# Patient Record
Sex: FEMALE | Race: OTHER | NOT HISPANIC OR LATINO | Employment: OTHER | URBAN - METROPOLITAN AREA
[De-identification: names, ages, dates, MRNs, and addresses within clinical notes are randomized per-mention and may not be internally consistent; named-entity substitution may affect disease eponyms.]

---

## 2020-02-17 LAB — HCV AB SER-ACNC: NEGATIVE

## 2020-06-08 ENCOUNTER — TELEPHONE (OUTPATIENT)
Dept: ADMINISTRATIVE | Facility: OTHER | Age: 65
End: 2020-06-08

## 2020-06-09 ENCOUNTER — OFFICE VISIT (OUTPATIENT)
Dept: FAMILY MEDICINE CLINIC | Facility: CLINIC | Age: 65
End: 2020-06-09
Payer: COMMERCIAL

## 2020-06-09 VITALS
TEMPERATURE: 97.7 F | BODY MASS INDEX: 28.34 KG/M2 | HEIGHT: 68 IN | WEIGHT: 187 LBS | RESPIRATION RATE: 17 BRPM | HEART RATE: 94 BPM | SYSTOLIC BLOOD PRESSURE: 114 MMHG | OXYGEN SATURATION: 96 % | DIASTOLIC BLOOD PRESSURE: 78 MMHG

## 2020-06-09 DIAGNOSIS — M25.50 ARTHRALGIA, UNSPECIFIED JOINT: ICD-10-CM

## 2020-06-09 DIAGNOSIS — Z53.20 MAMMOGRAM DECLINED: ICD-10-CM

## 2020-06-09 DIAGNOSIS — I10 ESSENTIAL HYPERTENSION: ICD-10-CM

## 2020-06-09 DIAGNOSIS — Z78.0 POSTMENOPAUSAL: ICD-10-CM

## 2020-06-09 DIAGNOSIS — I21.4 NSTEMI, INITIAL EPISODE OF CARE (HCC): ICD-10-CM

## 2020-06-09 DIAGNOSIS — Z13.31 NEGATIVE DEPRESSION SCREENING: ICD-10-CM

## 2020-06-09 DIAGNOSIS — E03.9 HYPOTHYROIDISM, UNSPECIFIED TYPE: ICD-10-CM

## 2020-06-09 DIAGNOSIS — Z12.4 CERVICAL CANCER SCREENING: ICD-10-CM

## 2020-06-09 DIAGNOSIS — Z76.89 ENCOUNTER TO ESTABLISH CARE: Primary | ICD-10-CM

## 2020-06-09 PROBLEM — K21.9 GASTROESOPHAGEAL REFLUX DISEASE WITHOUT ESOPHAGITIS: Status: ACTIVE | Noted: 2020-01-23

## 2020-06-09 PROBLEM — S83.241A ACUTE MEDIAL MENISCUS TEAR OF RIGHT KNEE: Status: ACTIVE | Noted: 2019-07-03

## 2020-06-09 PROBLEM — E78.2 MIXED HYPERLIPIDEMIA: Status: ACTIVE | Noted: 2020-01-23

## 2020-06-09 PROBLEM — M23.91 INTERNAL DERANGEMENT OF RIGHT KNEE: Status: ACTIVE | Noted: 2019-07-03

## 2020-06-09 PROCEDURE — 3008F BODY MASS INDEX DOCD: CPT | Performed by: PHYSICIAN ASSISTANT

## 2020-06-09 PROCEDURE — 99204 OFFICE O/P NEW MOD 45 MIN: CPT | Performed by: PHYSICIAN ASSISTANT

## 2020-06-09 PROCEDURE — 3074F SYST BP LT 130 MM HG: CPT | Performed by: PHYSICIAN ASSISTANT

## 2020-06-09 PROCEDURE — 1036F TOBACCO NON-USER: CPT | Performed by: PHYSICIAN ASSISTANT

## 2020-06-09 PROCEDURE — 3078F DIAST BP <80 MM HG: CPT | Performed by: PHYSICIAN ASSISTANT

## 2020-06-09 RX ORDER — CLOPIDOGREL BISULFATE 75 MG/1
75 TABLET ORAL EVERY MORNING
COMMUNITY
Start: 2020-05-16 | End: 2020-08-03

## 2020-06-09 RX ORDER — AMLODIPINE BESYLATE 5 MG/1
5 TABLET ORAL DAILY
COMMUNITY
Start: 2020-05-16 | End: 2020-10-28 | Stop reason: SDUPTHER

## 2020-06-09 RX ORDER — ASPIRIN 81 MG
81 TABLET,CHEWABLE ORAL EVERY MORNING
COMMUNITY
Start: 2020-05-16 | End: 2022-02-08 | Stop reason: HOSPADM

## 2020-06-09 RX ORDER — VENLAFAXINE 75 MG/1
75 TABLET ORAL 2 TIMES DAILY
COMMUNITY
Start: 2020-03-16 | End: 2020-10-28 | Stop reason: SDUPTHER

## 2020-06-09 RX ORDER — LEVOTHYROXINE SODIUM 125 UG/1
125 TABLET ORAL EVERY MORNING
COMMUNITY
Start: 2020-03-16 | End: 2020-09-28 | Stop reason: SDUPTHER

## 2020-06-09 RX ORDER — ATORVASTATIN CALCIUM 40 MG/1
40 TABLET, FILM COATED ORAL
COMMUNITY
End: 2020-08-03 | Stop reason: SDUPTHER

## 2020-08-03 ENCOUNTER — CONSULT (OUTPATIENT)
Dept: CARDIOLOGY CLINIC | Facility: CLINIC | Age: 65
End: 2020-08-03
Payer: COMMERCIAL

## 2020-08-03 VITALS
DIASTOLIC BLOOD PRESSURE: 80 MMHG | WEIGHT: 186 LBS | HEART RATE: 75 BPM | HEIGHT: 68 IN | SYSTOLIC BLOOD PRESSURE: 130 MMHG | BODY MASS INDEX: 28.19 KG/M2

## 2020-08-03 DIAGNOSIS — E78.2 MIXED HYPERLIPIDEMIA: ICD-10-CM

## 2020-08-03 DIAGNOSIS — I10 ESSENTIAL HYPERTENSION: Primary | ICD-10-CM

## 2020-08-03 DIAGNOSIS — I21.4 NSTEMI, INITIAL EPISODE OF CARE (HCC): ICD-10-CM

## 2020-08-03 PROBLEM — I25.10 CORONARY ARTERY DISEASE INVOLVING NATIVE CORONARY ARTERY OF NATIVE HEART WITHOUT ANGINA PECTORIS: Status: ACTIVE | Noted: 2020-08-03

## 2020-08-03 PROCEDURE — 3079F DIAST BP 80-89 MM HG: CPT | Performed by: INTERNAL MEDICINE

## 2020-08-03 PROCEDURE — 99244 OFF/OP CNSLTJ NEW/EST MOD 40: CPT | Performed by: INTERNAL MEDICINE

## 2020-08-03 PROCEDURE — 3075F SYST BP GE 130 - 139MM HG: CPT | Performed by: INTERNAL MEDICINE

## 2020-08-03 PROCEDURE — 1036F TOBACCO NON-USER: CPT | Performed by: INTERNAL MEDICINE

## 2020-08-03 PROCEDURE — 3008F BODY MASS INDEX DOCD: CPT | Performed by: INTERNAL MEDICINE

## 2020-08-03 PROCEDURE — 93000 ELECTROCARDIOGRAM COMPLETE: CPT | Performed by: INTERNAL MEDICINE

## 2020-08-03 RX ORDER — ATORVASTATIN CALCIUM 80 MG/1
80 TABLET, FILM COATED ORAL
Qty: 90 TABLET | Refills: 5 | Status: SHIPPED | OUTPATIENT
Start: 2020-08-03 | End: 2021-03-31 | Stop reason: SDUPTHER

## 2020-08-03 NOTE — ASSESSMENT & PLAN NOTE
Refill requested for:     Losartan 50 mg  Last filled on:     9/07/2017  #30  0 refills    Last office visit:  3/16/2017     Pending office visit none    Medication refilled per protocol.  Per MD note:Recheck one year or before prn for CPX.         Same Losartan and prn Triam/HCTZ.    Relatively mild despite recent non ST elevation MI  Plavix can be stopped as there was no intervention

## 2020-08-03 NOTE — PATIENT INSTRUCTIONS
Stop plavix (clopidogrel)  Can hold amlodipine if systolic BP (top number) remains mostly 135 and lower  Increase atorvastatin to 80 mg daily

## 2020-08-03 NOTE — PROGRESS NOTES
Patient ID: Bonilla Turner is a 60 y o  female  Plan:      Essential hypertension  Well controlled  Coronary artery disease involving native coronary artery of native heart without angina pectoris  Relatively mild despite recent non ST elevation MI  Plavix can be stopped as there was no intervention  Mixed hyperlipidemia  LDL was quite high in January  Will increase atorvastatin to 80 mg daily  Follow up Plan:  1 year EKG and follow-up visit  I gave the patient the following instructions:  Stop plavix (clopidogrel)  Can hold amlodipine if systolic BP (top number) remains mostly 135 and lower  Increase atorvastatin to 80 mg daily  HPI:  Patient is seen today in consultation from [address] [place] [address] regarding the above  The patient, who is known as Bassam, has a strong family history of early CAD but not early sudden death  In January of 2020 she experience indigestion and went to [facility]  Blood pressure was quite high  There was a non ST elevation MI  Coronary angiography revealed severe stenosis of a small 1st diagonal vessel but otherwise mild diffuse disease  Medical therapy was of course recommended  She did not tolerate all of her medications and some have been peeled back over the last few months  She has had lots of bruising and would like to cut back further on meds if possible  No recent exertional indigestion  She remains active  Her diet is mainly vegetarian but she eats a fair amount of cheese  No syncope or near syncope  She is back to work  Results for orders placed or performed in visit on 08/03/20   POCT ECG    Impression    Normal sinus rhythm  Minor nonspecific ST segment changes  Past Surgical History:   Procedure Laterality Date    ANGIOPLASTY      CARDIAC CATHETERIZATION  01/22/2020    EF 75% There is a small LADD1 branch that has a severe stenosis   Given the small size of the vessel the plan will be to treat medically    TONSILLECTOMY       CMP: No results found for: NA, K, CL, CO2, BUN, CREATININE, GLUCOSE, EGFR    Lipid Profile: No results found for: CHOL, TRIG, HDL, LDL      Review of Systems   10  point ROS  was otherwise non pertinent or negative except as per HPI or as below  Gait: Normal         Objective:     /80   Pulse 75   Ht 5' 8"   Wt 84 4 kg (186 lb)   BMI 28 28 kg/m²     PHYSICAL EXAM:    General:  Normal appearance in no distress  Eyes:  Anicteric  Oral mucosa:  Moist   Neck:  No JVD  Carotid upstrokes are brisk without bruits  No masses  Chest:  Clear to auscultation and percussion  Cardiac:  Normal PMI  Normal S1 and S2  No murmur gallop or rub  Abdomen:  Soft and nontender  No palpable organomegaly or aortic enlargement  Extremities:  No peripheral edema  Musculoskeletal:  Symmetric  Vascular:  Femoral pulses are brisk without bruits  Popliteal pulses are intact bilaterally  Pedal pulses are intact  Neuro:  Grossly symmetric  Psych:  Alert and oriented x3          Current Outpatient Medications:     amLODIPine (NORVASC) 5 mg tablet, Take 5 mg by mouth daily , Disp: , Rfl:     ASPIRIN LOW DOSE 81 MG chewable tablet, Chew 81 mg every morning , Disp: , Rfl:     atorvastatin (LIPITOR) 80 mg tablet, Take 1 tablet (80 mg total) by mouth daily at bedtime, Disp: 90 tablet, Rfl: 5    SYNTHROID 125 MCG tablet, Take 125 mcg by mouth every morning , Disp: , Rfl:     venlafaxine (EFFEXOR) 75 mg tablet, Take 75 mg by mouth 2 (two) times a day , Disp: , Rfl:   Allergies   Allergen Reactions    Other Hives and Rash     Adhesive tape       Past Medical History:   Diagnosis Date    Essential hypertension     Exertional chest pain     Hypothyroidism     Unstable angina pectoris ([facility] [address] ) 01/28/2020           Social History     Tobacco Use   Smoking Status Former Smoker   Smokeless Tobacco Never Used

## 2020-08-04 ENCOUNTER — TELEPHONE (OUTPATIENT)
Dept: CARDIOLOGY CLINIC | Facility: CLINIC | Age: 65
End: 2020-08-04

## 2020-08-04 NOTE — TELEPHONE ENCOUNTER
[facility]               Wants to go over lab results   Family doctor told her different from what Dr Sam Brown told        I called Patsy Arias and she said her labs were just done 8/1  They are in epic and her lipids are really good her PCP told her  So she said to let you know in case you do not want her to increase atorvastatin to 80mg  She said she lost a lot of weight and eating healthier so feels that's why her numbers improved from January  She also wanted me to ask you if you are related to an [place]? Some one she knew from camp years ago

## 2020-09-28 ENCOUNTER — TELEPHONE (OUTPATIENT)
Dept: FAMILY MEDICINE CLINIC | Facility: CLINIC | Age: 65
End: 2020-09-28

## 2020-09-28 DIAGNOSIS — E03.9 HYPOTHYROIDISM, UNSPECIFIED TYPE: Primary | ICD-10-CM

## 2020-09-28 RX ORDER — LEVOTHYROXINE SODIUM 125 UG/1
125 TABLET ORAL EVERY MORNING
Qty: 90 TABLET | Refills: 1 | Status: SHIPPED | OUTPATIENT
Start: 2020-09-28 | End: 2020-09-30

## 2020-09-30 DIAGNOSIS — E03.9 HYPOTHYROIDISM, UNSPECIFIED TYPE: Primary | ICD-10-CM

## 2020-09-30 RX ORDER — LEVOTHYROXINE SODIUM 0.12 MG/1
125 TABLET ORAL DAILY
Qty: 90 TABLET | Refills: 1 | Status: SHIPPED | OUTPATIENT
Start: 2020-09-30 | End: 2021-03-31 | Stop reason: SDUPTHER

## 2020-09-30 NOTE — TELEPHONE ENCOUNTER
Pharmacy called and would like clarification on the Levothyroxine script  Pharmacy called that it was written for brand necessary but patient takes generic and can not afford synthroid  Patient is out of medication and will need this addressed today

## 2020-10-01 NOTE — TELEPHONE ENCOUNTER
It was reconciled as brand name when she established care from [address]   I sent generic now

## 2020-10-28 DIAGNOSIS — F41.9 ANXIETY: ICD-10-CM

## 2020-10-28 DIAGNOSIS — F32.A DEPRESSIVE DISORDER: ICD-10-CM

## 2020-10-28 DIAGNOSIS — I10 ESSENTIAL HYPERTENSION: Primary | ICD-10-CM

## 2020-10-28 DIAGNOSIS — E03.9 HYPOTHYROIDISM, UNSPECIFIED TYPE: ICD-10-CM

## 2020-10-28 RX ORDER — VENLAFAXINE 75 MG/1
75 TABLET ORAL 2 TIMES DAILY
Qty: 90 TABLET | Refills: 1 | Status: SHIPPED | OUTPATIENT
Start: 2020-10-28 | End: 2021-01-25

## 2020-10-28 RX ORDER — AMLODIPINE BESYLATE 5 MG/1
5 TABLET ORAL DAILY
Qty: 90 TABLET | Refills: 1 | Status: SHIPPED | OUTPATIENT
Start: 2020-10-28 | End: 2021-03-02 | Stop reason: SDUPTHER

## 2020-12-04 ENCOUNTER — TELEPHONE (OUTPATIENT)
Dept: FAMILY MEDICINE CLINIC | Facility: CLINIC | Age: 65
End: 2020-12-04

## 2020-12-31 ENCOUNTER — TELEPHONE (OUTPATIENT)
Dept: FAMILY MEDICINE CLINIC | Facility: CLINIC | Age: 65
End: 2020-12-31

## 2021-01-23 DIAGNOSIS — F41.9 ANXIETY: ICD-10-CM

## 2021-01-23 DIAGNOSIS — F32.A DEPRESSIVE DISORDER: ICD-10-CM

## 2021-01-25 RX ORDER — VENLAFAXINE 75 MG/1
TABLET ORAL
Qty: 90 TABLET | Refills: 1 | Status: SHIPPED | OUTPATIENT
Start: 2021-01-25 | End: 2021-03-02 | Stop reason: SDUPTHER

## 2021-02-09 ENCOUNTER — OFFICE VISIT (OUTPATIENT)
Dept: FAMILY MEDICINE CLINIC | Facility: CLINIC | Age: 66
End: 2021-02-09
Payer: MEDICARE

## 2021-02-09 VITALS
DIASTOLIC BLOOD PRESSURE: 82 MMHG | OXYGEN SATURATION: 97 % | SYSTOLIC BLOOD PRESSURE: 128 MMHG | BODY MASS INDEX: 30.16 KG/M2 | RESPIRATION RATE: 18 BRPM | TEMPERATURE: 97.8 F | HEIGHT: 68 IN | WEIGHT: 199 LBS | HEART RATE: 68 BPM

## 2021-02-09 DIAGNOSIS — E03.8 OTHER SPECIFIED HYPOTHYROIDISM: ICD-10-CM

## 2021-02-09 DIAGNOSIS — I10 ESSENTIAL HYPERTENSION: ICD-10-CM

## 2021-02-09 DIAGNOSIS — E55.9 VITAMIN D DEFICIENCY: ICD-10-CM

## 2021-02-09 DIAGNOSIS — E78.2 MIXED HYPERLIPIDEMIA: ICD-10-CM

## 2021-02-09 DIAGNOSIS — Z00.00 WELCOME TO MEDICARE PREVENTIVE VISIT: Primary | ICD-10-CM

## 2021-02-09 DIAGNOSIS — E03.9 HYPOTHYROIDISM, UNSPECIFIED TYPE: ICD-10-CM

## 2021-02-09 DIAGNOSIS — Z12.12 SCREENING FOR COLORECTAL CANCER: ICD-10-CM

## 2021-02-09 DIAGNOSIS — Z12.11 SCREENING FOR COLORECTAL CANCER: ICD-10-CM

## 2021-02-09 PROCEDURE — 1123F ACP DISCUSS/DSCN MKR DOCD: CPT | Performed by: PHYSICIAN ASSISTANT

## 2021-02-09 PROCEDURE — G0402 INITIAL PREVENTIVE EXAM: HCPCS | Performed by: PHYSICIAN ASSISTANT

## 2021-02-09 NOTE — PROGRESS NOTES
Assessment and Plan:     Problem List Items Addressed This Visit        Endocrine    Hypothyroidism    Relevant Orders    TSH, 3rd generation    T4, free    TSH, 3rd generation    T4, free       Cardiovascular and Mediastinum    Essential hypertension    Relevant Orders    Comprehensive metabolic panel       Other    Mixed hyperlipidemia    Relevant Orders    Lipid panel      Other Visit Diagnoses     Welcome to Medicare preventive visit    -  Primary    Screening for colorectal cancer        Relevant Orders    Cologuard    BMI 30 0-30 9,adult        Vitamin D deficiency        Relevant Orders    Vitamin D 25 hydroxy           Preventive health issues were discussed with patient, and age appropriate screening tests were ordered as noted in patient's After Visit Summary  Personalized health advice and appropriate referrals for health education or preventive services given if needed, as noted in patient's After Visit Summary  History of Present Illness:     Chief Complaint   Patient presents with    Follow-up     Declines mammogram  Does not see a need to see gyn at this time    Medicare Wellness Visit     Welcome to [facility]        Patient presents for Welcome to Medicare visit  Patient Care Team:  Severo Riddle, PA-C as PCP - General (Family Medicine)     Review of Systems:     Review of Systems   Constitutional: Negative  Respiratory: Negative  Cardiovascular: Negative  Gastrointestinal: Negative  Genitourinary: Negative  Skin: Negative  Neurological: Negative  Psychiatric/Behavioral: Nervous/anxious: stable         Problem List:     Patient Active Problem List   Diagnosis    BMI 28 0-28 9,adult    Acute medial meniscus tear of right knee    Anxiety    Contact dermatitis and other eczema, due to unspecified cause    Depressive disorder    Essential hypertension    Gastroesophageal reflux disease without esophagitis    Hypothyroidism    Internal derangement of right knee  Mixed hyperlipidemia    NSTEMI, initial [facility] of [facility])    Coronary artery disease involving native coronary artery of native heart without angina pectoris      Past Medical and Surgical History:     Past Medical History:   Diagnosis Date    Essential hypertension     Exertional chest pain     Hypothyroidism     Unstable angina pectoris ([address] ) 01/28/2020     Past Surgical History:   Procedure Laterality Date    ANGIOPLASTY      CARDIAC CATHETERIZATION  01/22/2020    EF 75% There is a small LADD1 branch that has a severe stenosis   Given the small size of the vessel the plan will be to treat medically    TONSILLECTOMY        Family History:     Family History   Problem Relation Age of Onset    Thyroid disease Mother     Heart attack Father     Heart attack Brother     Thyroid disease Maternal Grandmother     Cancer Maternal Grandmother     Diabetes Maternal Grandfather     Heart attack Maternal Grandfather     Heart attack Paternal Grandfather     Heart attack Maternal Uncle       Social History:        Social History     Socioeconomic History    Marital status: Single     Spouse name: None    Number of children: None    Years of education: None    Highest education level: None   Occupational History    None   Social Needs    Financial resource strain: None    Food insecurity     Worry: None     Inability: None    Transportation needs     Medical: No     Non-medical: No   Tobacco Use    Smoking status: Former Smoker    Smokeless tobacco: Never Used   Substance and Sexual Activity    Alcohol use: Not Currently    Drug use: Never    Sexual activity: Not Currently   Lifestyle    Physical activity     Days per week: None     Minutes per session: None    Stress: None   Relationships    Social connections     Talks on phone: None     Gets together: None     Attends Taoism service: None     Active member of club or organization: None     Attends meetings of clubs or organizations: None     Relationship status: None    Intimate partner violence     Fear of current or ex partner: None     Emotionally abused: None     Physically abused: None     Forced sexual activity: None   Other Topics Concern    None   Social History Narrative    None      Medications and Allergies:     Current Outpatient Medications   Medication Sig Dispense Refill    amLODIPine (NORVASC) 5 mg tablet Take 1 tablet (5 mg total) by mouth daily 90 tablet 1    ASPIRIN LOW DOSE 81 MG chewable tablet Chew 81 mg every morning       atorvastatin (LIPITOR) 80 mg tablet Take 1 tablet (80 mg total) by mouth daily at bedtime 90 tablet 5    levothyroxine 125 mcg tablet Take 1 tablet (125 mcg total) by mouth daily 90 tablet 1    venlafaxine (EFFEXOR) 75 mg tablet Take 1 tablet by mouth twice daily 90 tablet 1     No current facility-administered medications for this visit  Allergies   Allergen Reactions    Other Hives and Rash     Adhesive tape        Immunizations:     Immunization History   Administered Date(s) Administered    Lyme Disease 08/01/2003    Pneumococcal Polysaccharide PPV23 01/29/2020    Rotavirus Tetravalent 08/01/2016    Tuberculin Skin Test-PPD Intradermal 05/19/2011    Unknown 08/01/2016      Health Maintenance:         Topic Date Due    HIV Screening  [birth year]20/1970    Cervical Cancer Screening  02/20/1977    Colorectal Cancer Screening  02/16/2021    MAMMOGRAM  02/09/2022 (Originally 1955)    Hepatitis C Screening  Completed         Topic Date Due    DTaP,Tdap,and Td Vaccines (1 - Tdap) 02/20/1977    Influenza Vaccine (1) 09/01/2020      Medicare Screening Tests and Risk Assessments:     Jonh Darnell is here for her Welcome to Medicare visit  Health Risk Assessment:   Patient rates overall health as good  Patient feels that their physical health rating is same  Eyesight was rated as same  Hearing was rated as same   Patient feels that their emotional and mental health rating is same  Pain experienced in the last 7 days has been some  Patient's pain rating has been 3/10  Patient states that she has experienced weight loss or gain in last 6 months  She sees [facility]  Then went to [facility] for readers  Depression Screening:   PHQ-2 Score: 0  PHQ-9 Score: 0      Fall Risk Screening: In the past year, patient has experienced: no history of falling in past year      Urinary Incontinence Screening:   Patient has not leaked urine accidently in the last six months  Home Safety:  Patient does not have trouble with stairs inside or outside of their home  Patient has working smoke alarms and has working carbon monoxide detector  Home safety hazards include: none  Nutrition:   Current diet is Other (please comment)  No meat, no caffeine,     Medications:   Patient is currently taking over-the-counter supplements  OTC medications include: see medication list  Patient is able to manage medications  Activities of Daily Living (ADLs)/Instrumental Activities of Daily Living (IADLs):   Walk and transfer into and out of bed and chair?: Yes  Dress and groom yourself?: Yes    Bathe or shower yourself?: Yes    Feed yourself?  Yes  Do your laundry/housekeeping?: Yes  Manage your money, pay your bills and track your expenses?: Yes  Make your own meals?: Yes    Do your own shopping?: Yes    Previous Hospitalizations:   Any hospitalizations or ED visits within the last 12 months?: Yes    How many hospitalizations have you had in the last year?: 1-2    Advance Care Planning:   Living will: No    Advanced directive counseling given: Yes      Cognitive Screening:   Provider or family/friend/caregiver concerned regarding cognition?: No    PREVENTIVE SCREENINGS      Cardiovascular Screening:    General: Screening Not Indicated and History Lipid Disorder      Diabetes Screening:     General: Screening Current    Due for: Blood Glucose      Colorectal Cancer Screening:     General: Screening Current    Due for: Cologuard      Breast Cancer Screening:     General: Patient Declines      Cervical Cancer Screening:    General: Patient Declines      Osteoporosis Screening:    General: Patient Declines    Due for: Bone Density Ultrasound      Abdominal Aortic Aneurysm (AAA) Screening:        General: Screening Not Indicated      Lung Cancer Screening:     General: Screening Not Indicated      Hepatitis C Screening:    General: Screening Current    No exam data present     Physical Exam:     /82 (BP Location: Left arm, Patient Position: Sitting)   Pulse 68   Temp 97 8 °F (36 6 °C)   Resp 18   Ht 5' 8" (1 727 m)   Wt 90 3 kg (199 lb)   SpO2 97%   BMI 30 26 kg/m²     Physical Exam  Constitutional:       General: She is not in acute distress  Appearance: Normal appearance  She is not diaphoretic  HENT:      Head: Normocephalic and atraumatic  Right Ear: Tympanic membrane, ear canal and external ear normal       Left Ear: Tympanic membrane, ear canal and external ear normal       Nose: Nose normal       Mouth/Throat:      Mouth: Mucous membranes are moist       Pharynx: Oropharynx is clear  Eyes:      Conjunctiva/sclera: Conjunctivae normal       Pupils: Pupils are equal, round, and reactive to light  Neck:      Musculoskeletal: Normal range of motion and neck supple  Cardiovascular:      Rate and Rhythm: Normal rate and regular rhythm  Pulses: Normal pulses  Pulmonary:      Effort: Pulmonary effort is normal       Breath sounds: Normal breath sounds  No wheezing  Musculoskeletal:         General: No deformity or signs of injury  Right lower leg: No edema  Left lower leg: No edema  Skin:     General: Skin is warm and dry  Findings: No erythema or rash  Neurological:      General: No focal deficit present  Mental Status: She is alert and oriented to person, place, and time  Cranial Nerves: No cranial nerve deficit     Psychiatric: Mood and Affect: Mood normal          Behavior: Behavior normal          Thought Content:  Thought content normal          Judgment: Judgment normal           Severo Riddle, PA-C

## 2021-02-09 NOTE — PATIENT INSTRUCTIONS
Medicare Preventive Visit Patient Instructions  Thank you for completing your Welcome to Medicare Visit or Medicare Annual Wellness Visit today  Your next wellness visit will be due in one year (2/9/2022)  The screening/preventive services that you may require over the next 5-10 years are detailed below  Some tests may not apply to you based off risk factors and/or age  Screening tests ordered at today's visit but not completed yet may show as past due  Also, please note that scanned in results may not display below  Preventive Screenings:  Service Recommendations Previous Testing/Comments   Colorectal Cancer Screening  * Colonoscopy    * Fecal Occult Blood Test (FOBT)/Fecal Immunochemical Test (FIT)  * Fecal DNA/Cologuard Test  * Flexible Sigmoidoscopy Age: 54-65 years old   Colonoscopy: every 10 years (may be performed more frequently if at higher risk)  OR  FOBT/FIT: every 1 year  OR  Cologuard: every 3 years  OR  Sigmoidoscopy: every 5 years  Screening may be recommended earlier than age 48 if at higher risk for colorectal cancer  Also, an individualized decision between you and your healthcare provider will decide whether screening between the ages of 74-80 would be appropriate  Colonoscopy: Not on file  FOBT/FIT: 02/16/2020  Cologuard: Not on file  Sigmoidoscopy: Not on file    Screening Current     Breast Cancer Screening Age: 36 years old  Frequency: every 1-2 years  Not required if history of left and right mastectomy Mammogram: Not on file       Cervical Cancer Screening Between the ages of 21-29, pap smear recommended once every 3 years  Between the ages of 33-67, can perform pap smear with HPV co-testing every 5 years     Recommendations may differ for women with a history of total hysterectomy, cervical cancer, or abnormal pap smears in past  Pap Smear: Not on file       Hepatitis C Screening Once for adults born between 1945 and 1965  More frequently in patients at high risk for Hepatitis C Hep C Antibody: 02/17/2020    Screening Current   Diabetes Screening 1-2 times per year if you're at risk for diabetes or have pre-diabetes Fasting glucose: No results in last 5 years   A1C: 5 6 %       Cholesterol Screening Once every 5 years if you don't have a lipid disorder  May order more often based on risk factors  Lipid panel: 08/01/2020    Screening Not Indicated  History Lipid Disorder     Other Preventive Screenings Covered by Medicare:  1  Abdominal Aortic Aneurysm (AAA) Screening: covered once if your at risk  You're considered to be at risk if you have a family history of AAA  2  Lung Cancer Screening: covers low dose CT scan once per year if you meet all of the following conditions: (1) Age 50-69; (2) No signs or symptoms of lung cancer; (3) Current smoker or have quit smoking within the last 15 years; (4) You have a tobacco smoking history of at least 30 pack years (packs per day multiplied by number of years you smoked); (5) You get a written order from a healthcare provider  3  Glaucoma Screening: covered annually if you're considered high risk: (1) You have diabetes OR (2) Family history of glaucoma OR (3)  aged 48 and older OR (3)  American aged 72 and older  3  Osteoporosis Screening: covered every 2 years if you meet one of the following conditions: (1) You're estrogen deficient and at risk for osteoporosis based off medical history and other findings; (2) Have a vertebral abnormality; (3) On glucocorticoid therapy for more than 3 months; (4) Have primary hyperparathyroidism; (5) On osteoporosis medications and need to assess response to drug therapy  · Last bone density test (DXA Scan): Not on file  5  HIV Screening: covered annually if you're between the age of 12-76  Also covered annually if you are younger than 13 and older than 72 with risk factors for HIV infection  For pregnant patients, it is covered up to 3 times per pregnancy      Immunizations:  Immunization Recommendations   Influenza Vaccine Annual influenza vaccination during flu season is recommended for all persons aged >= 6 months who do not have contraindications   Pneumococcal Vaccine (Prevnar and Pneumovax)  * Prevnar = PCV13  * Pneumovax = PPSV23   Adults 25-60 years old: 1-3 doses may be recommended based on certain risk factors  Adults 72 years old: Prevnar (PCV13) vaccine recommended followed by Pneumovax (PPSV23) vaccine  If already received PPSV23 since turning 65, then PCV13 recommended at least one year after PPSV23 dose  Hepatitis B Vaccine 3 dose series if at intermediate or high risk (ex: diabetes, end stage renal disease, liver disease)   Tetanus (Td) Vaccine - COST NOT COVERED BY MEDICARE PART B Following completion of primary series, a booster dose should be given every 10 years to maintain immunity against tetanus  Td may also be given as tetanus wound prophylaxis  Tdap Vaccine - COST NOT COVERED BY MEDICARE PART B Recommended at least once for all adults  For pregnant patients, recommended with each pregnancy  Shingles Vaccine (Shingrix) - COST NOT COVERED BY MEDICARE PART B  2 shot series recommended in those aged 48 and above     Health Maintenance Due:      Topic Date Due    MAMMOGRAM  1955    HIV Screening  02/20/1971    Cervical Cancer Screening  02/20/1977    Colorectal Cancer Screening  02/16/2021    Hepatitis C Screening  Completed     Immunizations Due:      Topic Date Due    DTaP,Tdap,and Td Vaccines (1 - Tdap) 02/20/1977    Influenza Vaccine (1) 09/01/2020     Advance Directives   What are advance directives? Advance directives are legal documents that state your wishes and plans for medical care  These plans are made ahead of time in case you lose your ability to make decisions for yourself  Advance directives can apply to any medical decision, such as the treatments you want, and if you want to donate organs  What are the types of advance directives?   There are many types of advance directives, and each state has rules about how to use them  You may choose a combination of any of the following:  · Living will: This is a written record of the treatment you want  You can also choose which treatments you do not want, which to limit, and which to stop at a certain time  This includes surgery, medicine, IV fluid, and tube feedings  · Durable power of  for healthcare [facility]): This is a written record that states who you want to make healthcare choices for you when you are unable to make them for yourself  This person, called a proxy, is usually a family member or a friend  You may choose more than 1 proxy  · Do not resuscitate (DNR) order:  A DNR order is used in case your heart stops beating or you stop breathing  It is a request not to have certain forms of treatment, such as CPR  A DNR order may be included in other types of advance directives  · Medical directive: This covers the care that you want if you are in a coma, near death, or unable to make decisions for yourself  You can list the treatments you want for each condition  Treatment may include pain medicine, surgery, blood transfusions, dialysis, IV or tube feedings, and a ventilator (breathing machine)  · Values history: This document has questions about your views, beliefs, and how you feel and think about life  This information can help others choose the care that you would choose  Why are advance directives important? An advance directive helps you control your care  Although spoken wishes may be used, it is better to have your wishes written down  Spoken wishes can be misunderstood, or not followed  Treatments may be given even if you do not want them  An advance directive may make it easier for your family to make difficult choices about your care     Weight Management   Why it is important to manage your weight:  Being overweight increases your risk of health conditions such as heart disease, high blood pressure, type 2 diabetes, and certain types of cancer  It can also increase your risk for osteoarthritis, sleep apnea, and other respiratory problems  Aim for a slow, steady weight loss  Even a small amount of weight loss can lower your risk of health problems  How to lose weight safely:  A safe and healthy way to lose weight is to eat fewer calories and get regular exercise  You can lose up about 1 pound a week by decreasing the number of calories you eat by 500 calories each day  Healthy meal plan for weight management:  A healthy meal plan includes a variety of foods, contains fewer calories, and helps you stay healthy  A healthy meal plan includes the following:  · Eat whole-grain foods more often  A healthy meal plan should contain fiber  Fiber is the part of grains, fruits, and vegetables that is not broken down by your body  Whole-grain foods are healthy and provide extra fiber in your diet  Some examples of whole-grain foods are whole-wheat breads and pastas, oatmeal, brown rice, and bulgur  · Eat a variety of vegetables every day  Include dark, leafy greens such as spinach, kale, trevor greens, and mustard greens  Eat yellow and orange vegetables such as carrots, sweet potatoes, and winter squash  · Eat a variety of fruits every day  Choose fresh or canned fruit (canned in its own juice or light syrup) instead of juice  Fruit juice has very little or no fiber  · Eat low-fat dairy foods  Drink fat-free (skim) milk or 1% milk  Eat fat-free yogurt and low-fat cottage cheese  Try low-fat cheeses such as mozzarella and other reduced-fat cheeses  · Choose meat and other protein foods that are low in fat  Choose beans or other legumes such as split peas or lentils  Choose fish, skinless poultry (chicken or turkey), or lean cuts of red meat (beef or pork)  Before you cook meat or poultry, cut off any visible fat  · Use less fat and oil  Try baking foods instead of frying them  Add less fat, such as margarine, sour cream, regular salad dressing and mayonnaise to foods  Eat fewer high-fat foods  Some examples of high-fat foods include french fries, doughnuts, ice cream, and cakes  · Eat fewer sweets  Limit foods and drinks that are high in sugar  This includes candy, cookies, regular soda, and sweetened drinks  Exercise:  Exercise at least 30 minutes per day on most days of the week  Some examples of exercise include walking, biking, dancing, and swimming  You can also fit in more physical activity by taking the stairs instead of the elevator or parking farther away from stores  Ask your healthcare provider about the best exercise plan for you  © Copyright "[employer]" 2018 Information is for End User's use only and may not be sold, redistributed or otherwise used for commercial purposes   All illustrations and images included in CareNotes® are the copyrighted property of [employer]  or [address] [place] [address]

## 2021-03-02 ENCOUNTER — TELEPHONE (OUTPATIENT)
Dept: FAMILY MEDICINE CLINIC | Facility: CLINIC | Age: 66
End: 2021-03-02

## 2021-03-02 DIAGNOSIS — I10 ESSENTIAL HYPERTENSION: ICD-10-CM

## 2021-03-02 DIAGNOSIS — F32.A DEPRESSIVE DISORDER: ICD-10-CM

## 2021-03-02 DIAGNOSIS — E03.9 HYPOTHYROIDISM, UNSPECIFIED TYPE: ICD-10-CM

## 2021-03-02 DIAGNOSIS — F41.9 ANXIETY: ICD-10-CM

## 2021-03-02 RX ORDER — VENLAFAXINE 75 MG/1
75 TABLET ORAL 2 TIMES DAILY
Qty: 28 TABLET | Refills: 0 | Status: SHIPPED | OUTPATIENT
Start: 2021-03-02 | End: 2021-03-31 | Stop reason: SDUPTHER

## 2021-03-02 RX ORDER — AMLODIPINE BESYLATE 5 MG/1
5 TABLET ORAL DAILY
Qty: 14 TABLET | Refills: 0 | Status: SHIPPED | OUTPATIENT
Start: 2021-03-02 | End: 2021-03-31 | Stop reason: SDUPTHER

## 2021-03-02 NOTE — TELEPHONE ENCOUNTER
[address] in [place] called that they would like both scripts sent into  pharmacy for a short supply

## 2021-03-02 NOTE — TELEPHONE ENCOUNTER
Patient called stating she is in [place] for the pending passing of her mother  When she left she packed enough medications for a week  Her mother is still hanging on and she is running out of that week  Can we help her with prescriptions to hold her over until she can get home? Patient was guided to call the local [address] in [place] and see if they can give her a week or two of each medication, affordably, pending the circumstances  If they cannot help her to call back and we will see what we can do to help  I explained that her insurance may not cover a full refill of any particular one due to when she picked up her last refill  She understood all the above and stated she does not need a full 90 days at this time, just enough to hold her over  Patient understood to call back if she needs our assistance

## 2021-03-10 DIAGNOSIS — Z23 ENCOUNTER FOR IMMUNIZATION: ICD-10-CM

## 2021-03-18 ENCOUNTER — IMMUNIZATIONS (OUTPATIENT)
Dept: FAMILY MEDICINE CLINIC | Facility: HOSPITAL | Age: 66
End: 2021-03-18

## 2021-03-18 DIAGNOSIS — Z23 ENCOUNTER FOR IMMUNIZATION: Primary | ICD-10-CM

## 2021-03-18 PROCEDURE — 91301 SARS-COV-2 / COVID-19 MRNA VACCINE (MODERNA) 100 MCG: CPT

## 2021-03-18 PROCEDURE — 0011A SARS-COV-2 / COVID-19 MRNA VACCINE (MODERNA) 100 MCG: CPT

## 2021-03-31 DIAGNOSIS — I10 ESSENTIAL HYPERTENSION: ICD-10-CM

## 2021-03-31 DIAGNOSIS — F41.9 ANXIETY: ICD-10-CM

## 2021-03-31 DIAGNOSIS — E78.2 MIXED HYPERLIPIDEMIA: ICD-10-CM

## 2021-03-31 DIAGNOSIS — E03.9 HYPOTHYROIDISM, UNSPECIFIED TYPE: ICD-10-CM

## 2021-03-31 DIAGNOSIS — F32.A DEPRESSIVE DISORDER: ICD-10-CM

## 2021-03-31 RX ORDER — VENLAFAXINE 75 MG/1
75 TABLET ORAL 2 TIMES DAILY
Qty: 28 TABLET | Refills: 0 | Status: CANCELLED | OUTPATIENT
Start: 2021-03-31

## 2021-03-31 RX ORDER — VENLAFAXINE 75 MG/1
75 TABLET ORAL 2 TIMES DAILY
Qty: 90 TABLET | Refills: 1 | Status: SHIPPED | OUTPATIENT
Start: 2021-03-31 | End: 2021-06-27

## 2021-03-31 RX ORDER — AMLODIPINE BESYLATE 5 MG/1
5 TABLET ORAL DAILY
Qty: 90 TABLET | Refills: 1 | Status: SHIPPED | OUTPATIENT
Start: 2021-03-31 | End: 2021-10-18

## 2021-03-31 RX ORDER — LEVOTHYROXINE SODIUM 0.12 MG/1
125 TABLET ORAL DAILY
Qty: 90 TABLET | Refills: 1 | Status: SHIPPED | OUTPATIENT
Start: 2021-03-31 | End: 2021-10-18

## 2021-03-31 RX ORDER — AMLODIPINE BESYLATE 5 MG/1
5 TABLET ORAL DAILY
Qty: 90 TABLET | Refills: 1 | Status: CANCELLED | OUTPATIENT
Start: 2021-03-31

## 2021-03-31 RX ORDER — LEVOTHYROXINE SODIUM 0.12 MG/1
125 TABLET ORAL DAILY
Qty: 90 TABLET | Refills: 1 | Status: CANCELLED | OUTPATIENT
Start: 2021-03-31

## 2021-03-31 NOTE — TELEPHONE ENCOUNTER
Yuly Capone will call back with current dosage as she was unable to verify at this time, she states she was outside

## 2021-03-31 NOTE — TELEPHONE ENCOUNTER
Last refill date:   Amlodipine  3/2/2021  Atorvastatin  8/3/2020  Levothyroxine  9/30/2020  Venlafaxine  3/2/2021        Last appointment:  2/9/2021  Next appointment:  8/10/2021

## 2021-03-31 NOTE — TELEPHONE ENCOUNTER
Patient's cholesterol was last filled by cardiology and was high dose, which at least appt she was not tolerating  What dose is she currently taking as I have not filled this yet?

## 2021-04-01 RX ORDER — ATORVASTATIN CALCIUM 80 MG/1
80 TABLET, FILM COATED ORAL
Qty: 90 TABLET | Refills: 1 | Status: SHIPPED | OUTPATIENT
Start: 2021-04-01 | End: 2021-10-18

## 2021-04-01 NOTE — TELEPHONE ENCOUNTER
Patient stated the only medication reduced by her cardiologist was the B/P medication   The Atorvastatin remains at current dose of 80 Mg

## 2021-04-16 ENCOUNTER — IMMUNIZATIONS (OUTPATIENT)
Dept: FAMILY MEDICINE CLINIC | Facility: HOSPITAL | Age: 66
End: 2021-04-16

## 2021-04-16 DIAGNOSIS — Z23 ENCOUNTER FOR IMMUNIZATION: Primary | ICD-10-CM

## 2021-04-16 PROCEDURE — 91301 SARS-COV-2 / COVID-19 MRNA VACCINE (MODERNA) 100 MCG: CPT

## 2021-04-16 PROCEDURE — 0012A SARS-COV-2 / COVID-19 MRNA VACCINE (MODERNA) 100 MCG: CPT

## 2021-05-18 ENCOUNTER — APPOINTMENT (OUTPATIENT)
Dept: LAB | Facility: CLINIC | Age: 66
End: 2021-05-18
Payer: MEDICARE

## 2021-05-18 DIAGNOSIS — E03.9 HYPOTHYROIDISM, UNSPECIFIED TYPE: ICD-10-CM

## 2021-05-18 DIAGNOSIS — I21.4 NSTEMI, INITIAL EPISODE OF CARE (HCC): ICD-10-CM

## 2021-05-18 DIAGNOSIS — E78.2 MIXED HYPERLIPIDEMIA: ICD-10-CM

## 2021-05-18 DIAGNOSIS — E55.9 VITAMIN D DEFICIENCY: ICD-10-CM

## 2021-05-18 DIAGNOSIS — E03.8 OTHER SPECIFIED HYPOTHYROIDISM: ICD-10-CM

## 2021-05-18 DIAGNOSIS — I10 ESSENTIAL HYPERTENSION: ICD-10-CM

## 2021-05-18 LAB
25(OH)D3 SERPL-MCNC: 15.7 NG/ML (ref 30–100)
ALBUMIN SERPL BCP-MCNC: 3.7 G/DL (ref 3.5–5)
ALP SERPL-CCNC: 80 U/L (ref 46–116)
ALT SERPL W P-5'-P-CCNC: 30 U/L (ref 12–78)
ANION GAP SERPL CALCULATED.3IONS-SCNC: 6 MMOL/L (ref 4–13)
AST SERPL W P-5'-P-CCNC: 18 U/L (ref 5–45)
BASOPHILS # BLD AUTO: 0.05 THOUSANDS/ΜL (ref 0–0.1)
BASOPHILS NFR BLD AUTO: 1 % (ref 0–1)
BILIRUB SERPL-MCNC: 0.56 MG/DL (ref 0.2–1)
BUN SERPL-MCNC: 19 MG/DL (ref 5–25)
CALCIUM SERPL-MCNC: 9.2 MG/DL (ref 8.3–10.1)
CHLORIDE SERPL-SCNC: 111 MMOL/L (ref 100–108)
CHOLEST SERPL-MCNC: 140 MG/DL (ref 50–200)
CO2 SERPL-SCNC: 25 MMOL/L (ref 21–32)
CREAT SERPL-MCNC: 0.67 MG/DL (ref 0.6–1.3)
EOSINOPHIL # BLD AUTO: 0.26 THOUSAND/ΜL (ref 0–0.61)
EOSINOPHIL NFR BLD AUTO: 3 % (ref 0–6)
ERYTHROCYTE [DISTWIDTH] IN BLOOD BY AUTOMATED COUNT: 14.3 % (ref 11.6–15.1)
GFR SERPL CREATININE-BSD FRML MDRD: 93 ML/MIN/1.73SQ M
GLUCOSE P FAST SERPL-MCNC: 116 MG/DL (ref 65–99)
HCT VFR BLD AUTO: 45.6 % (ref 34.8–46.1)
HDLC SERPL-MCNC: 43 MG/DL
HGB BLD-MCNC: 14.9 G/DL (ref 11.5–15.4)
IMM GRANULOCYTES # BLD AUTO: 0.02 THOUSAND/UL (ref 0–0.2)
IMM GRANULOCYTES NFR BLD AUTO: 0 % (ref 0–2)
LDLC SERPL CALC-MCNC: 79 MG/DL (ref 0–100)
LYMPHOCYTES # BLD AUTO: 2.35 THOUSANDS/ΜL (ref 0.6–4.47)
LYMPHOCYTES NFR BLD AUTO: 27 % (ref 14–44)
MCH RBC QN AUTO: 30.2 PG (ref 26.8–34.3)
MCHC RBC AUTO-ENTMCNC: 32.7 G/DL (ref 31.4–37.4)
MCV RBC AUTO: 93 FL (ref 82–98)
MONOCYTES # BLD AUTO: 0.77 THOUSAND/ΜL (ref 0.17–1.22)
MONOCYTES NFR BLD AUTO: 9 % (ref 4–12)
NEUTROPHILS # BLD AUTO: 5.21 THOUSANDS/ΜL (ref 1.85–7.62)
NEUTS SEG NFR BLD AUTO: 60 % (ref 43–75)
NONHDLC SERPL-MCNC: 97 MG/DL
NRBC BLD AUTO-RTO: 0 /100 WBCS
PLATELET # BLD AUTO: 238 THOUSANDS/UL (ref 149–390)
PMV BLD AUTO: 11.8 FL (ref 8.9–12.7)
POTASSIUM SERPL-SCNC: 3.8 MMOL/L (ref 3.5–5.3)
PROT SERPL-MCNC: 7.1 G/DL (ref 6.4–8.2)
RBC # BLD AUTO: 4.93 MILLION/UL (ref 3.81–5.12)
SODIUM SERPL-SCNC: 142 MMOL/L (ref 136–145)
T4 FREE SERPL-MCNC: 1.06 NG/DL (ref 0.76–1.46)
TRIGL SERPL-MCNC: 91 MG/DL
TSH SERPL DL<=0.05 MIU/L-ACNC: 1.97 UIU/ML (ref 0.36–3.74)
WBC # BLD AUTO: 8.66 THOUSAND/UL (ref 4.31–10.16)

## 2021-05-18 PROCEDURE — 84443 ASSAY THYROID STIM HORMONE: CPT

## 2021-05-18 PROCEDURE — 36415 COLL VENOUS BLD VENIPUNCTURE: CPT

## 2021-05-18 PROCEDURE — 84439 ASSAY OF FREE THYROXINE: CPT

## 2021-05-18 PROCEDURE — 82306 VITAMIN D 25 HYDROXY: CPT

## 2021-05-18 PROCEDURE — 80061 LIPID PANEL: CPT

## 2021-05-18 PROCEDURE — 85025 COMPLETE CBC W/AUTO DIFF WBC: CPT

## 2021-05-18 PROCEDURE — 80053 COMPREHEN METABOLIC PANEL: CPT

## 2021-05-21 DIAGNOSIS — E55.9 VITAMIN D DEFICIENCY: Primary | ICD-10-CM

## 2021-05-21 RX ORDER — ERGOCALCIFEROL 1.25 MG/1
50000 CAPSULE ORAL WEEKLY
Qty: 12 CAPSULE | Refills: 0 | Status: SHIPPED | OUTPATIENT
Start: 2021-05-21 | End: 2021-10-19

## 2021-08-03 ENCOUNTER — OFFICE VISIT (OUTPATIENT)
Dept: CARDIOLOGY CLINIC | Facility: CLINIC | Age: 66
End: 2021-08-03
Payer: MEDICARE

## 2021-08-03 VITALS
BODY MASS INDEX: 29.55 KG/M2 | DIASTOLIC BLOOD PRESSURE: 80 MMHG | HEIGHT: 68 IN | SYSTOLIC BLOOD PRESSURE: 140 MMHG | HEART RATE: 68 BPM | WEIGHT: 195 LBS

## 2021-08-03 DIAGNOSIS — E78.2 MIXED HYPERLIPIDEMIA: ICD-10-CM

## 2021-08-03 DIAGNOSIS — I25.10 CORONARY ARTERY DISEASE INVOLVING NATIVE CORONARY ARTERY OF NATIVE HEART WITHOUT ANGINA PECTORIS: Primary | ICD-10-CM

## 2021-08-03 DIAGNOSIS — K21.9 REFLUX INVOLVING INTESTINAL TRACT: ICD-10-CM

## 2021-08-03 PROCEDURE — 99214 OFFICE O/P EST MOD 30 MIN: CPT | Performed by: INTERNAL MEDICINE

## 2021-08-03 PROCEDURE — 93000 ELECTROCARDIOGRAM COMPLETE: CPT | Performed by: INTERNAL MEDICINE

## 2021-08-03 NOTE — PROGRESS NOTES
Patient ID: Alistair Caba is a 73 y o  female  Plan:      Mixed hyperlipidemia  Tolerating high-intensity statin therapy and will continue current regimen  Coronary artery disease involving native coronary artery of native heart without angina pectoris  Relatively mild by cath  Essential hypertension  Well controlled  Reflux involving intestinal tract  Frequent am emesis  Doesn't want PPI  Should have GI evaluation and I will arrange  Follow up Plan/Other summary comments:  1 year ecg and f/u  HPI: Patient seen in f/u regarding the above issues  Lots of aches and pains over the past year but no CP with effort  In January of 2020 she experience indigestion and went to [facility]  Blood pressure was quite high  There was a non ST elevation MI  Coronary angiography revealed severe stenosis of a small 1st diagonal vessel but otherwise mild diffuse disease  Medical therapy was of course recommended  Results for orders placed or performed in visit on 08/03/21   POCT ECG    Impression    NSR  NSSTs  Most recent or relevant cardiac/vascular testing:    NOne      Past Surgical History:   Procedure Laterality Date    ANGIOPLASTY      CARDIAC CATHETERIZATION  01/22/2020    EF 75% There is a small LADD1 branch that has a severe stenosis  Given the small size of the vessel the plan will be to treat medically    TONSILLECTOMY       CMP:   Lab Results   Component Value Date    K 3 8 05/18/2021     (H) 05/18/2021    CO2 25 05/18/2021    BUN 19 05/18/2021    CREATININE 0 67 05/18/2021    EGFR 93 05/18/2021       Lipid Profile:   Lab Results   Component Value Date    TRIG 91 05/18/2021    HDL 43 05/18/2021         Review of Systems   10  point ROS  was otherwise non pertinent or negative except as per HPI or as below     Gait: Normal         Objective:     /80   Pulse 68   Ht 5' 8" (1 727 m)   Wt 88 5 kg (195 lb)   BMI 29 65 kg/m²     PHYSICAL EXAM:    General:  Normal appearance in no distress  Eyes:  Anicteric  Oral mucosa:  Moist   Neck:  No JVD  Carotid upstrokes are brisk without bruits  No masses  Chest:  Clear to auscultation  Cardiac:  No palpable PMI  Normal S1 and S2  No murmur gallop or rub  Abdomen:  Soft and nontender  No palpable organomegaly or aortic enlargement  Extremities:  No peripheral edema  Musculoskeletal:  Symmetric  Vascular:  Femoral pulses are brisk without bruits  Popliteal pulses are intact bilaterally  Pedal pulses are intact  Neuro:  Grossly symmetric  Psych:  Alert and oriented x3          Current Outpatient Medications:     amLODIPine (NORVASC) 5 mg tablet, Take 1 tablet (5 mg total) by mouth daily, Disp: 90 tablet, Rfl: 1    ASPIRIN LOW DOSE 81 MG chewable tablet, Chew 81 mg every morning , Disp: , Rfl:     atorvastatin (LIPITOR) 80 mg tablet, Take 1 tablet (80 mg total) by mouth daily at bedtime, Disp: 90 tablet, Rfl: 1    ergocalciferol (VITAMIN D2) 50,000 units, Take 1 capsule (50,000 Units total) by mouth once a week, Disp: 12 capsule, Rfl: 0    levothyroxine 125 mcg tablet, Take 1 tablet (125 mcg total) by mouth daily, Disp: 90 tablet, Rfl: 1    venlafaxine (EFFEXOR) 75 mg tablet, Take 1 tablet by mouth twice daily, Disp: 90 tablet, Rfl: 1  Allergies   Allergen Reactions    Other Hives and Rash     Adhesive tape       Past Medical History:   Diagnosis Date    Essential hypertension     Exertional chest pain     Hypothyroidism     [facility]           Social History     Tobacco Use   Smoking Status Former Smoker   Smokeless Tobacco Never Used

## 2021-08-10 ENCOUNTER — OFFICE VISIT (OUTPATIENT)
Dept: FAMILY MEDICINE CLINIC | Facility: CLINIC | Age: 66
End: 2021-08-10
Payer: MEDICARE

## 2021-08-10 VITALS
HEIGHT: 69 IN | SYSTOLIC BLOOD PRESSURE: 118 MMHG | OXYGEN SATURATION: 97 % | WEIGHT: 192 LBS | BODY MASS INDEX: 28.44 KG/M2 | HEART RATE: 78 BPM | TEMPERATURE: 97.8 F | DIASTOLIC BLOOD PRESSURE: 78 MMHG

## 2021-08-10 DIAGNOSIS — G89.29 CHRONIC PAIN OF RIGHT KNEE: ICD-10-CM

## 2021-08-10 DIAGNOSIS — Z87.828 HISTORY OF MENISCAL TEAR: ICD-10-CM

## 2021-08-10 DIAGNOSIS — M25.561 CHRONIC PAIN OF RIGHT KNEE: ICD-10-CM

## 2021-08-10 DIAGNOSIS — L81.9 CHANGE IN COLOR OF PIGMENTED SKIN LESION: ICD-10-CM

## 2021-08-10 DIAGNOSIS — R11.10 INTERMITTENT VOMITING: ICD-10-CM

## 2021-08-10 DIAGNOSIS — G89.29 CHRONIC RIGHT HIP PAIN: ICD-10-CM

## 2021-08-10 DIAGNOSIS — L23.7 POISON IVY: ICD-10-CM

## 2021-08-10 DIAGNOSIS — R12 HEART BURN: ICD-10-CM

## 2021-08-10 DIAGNOSIS — I10 ESSENTIAL HYPERTENSION: Primary | ICD-10-CM

## 2021-08-10 DIAGNOSIS — G89.29 CHRONIC RIGHT SHOULDER PAIN: ICD-10-CM

## 2021-08-10 DIAGNOSIS — M25.551 CHRONIC RIGHT HIP PAIN: ICD-10-CM

## 2021-08-10 DIAGNOSIS — M25.511 CHRONIC RIGHT SHOULDER PAIN: ICD-10-CM

## 2021-08-10 PROCEDURE — 99215 OFFICE O/P EST HI 40 MIN: CPT | Performed by: PHYSICIAN ASSISTANT

## 2021-08-10 NOTE — PROGRESS NOTES
Routine Follow-up    Mack Abdullahi 73 y o  female   Date:  8/10/2021      Assessment and Plan:    Maxine Gallardo was seen today for follow-up, generalized body aches, poison ivy and mass  Diagnoses and all orders for this visit:    Essential hypertension  - stable no change    Intermittent vomiting;Heart burn  - declines medication relief, made GI appt already     Poison ivy  Comments:  can continue calamine, otc relief  encourage use of long sleeves when doing yard work     Change in color of pigmented skin lesion  Comments:  declines derm referral     Chronic pain of right knee  -     XR knee 3 vw right non injury; Future  -     Cancel: XR hip/pelv 2-3 vws right if performed; Future  -     Ambulatory referral to Physical Therapy; Future  -     Ambulatory referral to Orthopedic Surgery; Future    History of meniscal tear  -     XR knee 3 vw right non injury; Future  -     Ambulatory referral to Physical Therapy; Future  -     Ambulatory referral to Orthopedic Surgery; Future    Chronic right hip pain  -     Cancel: XR hip/pelv 2-3 vws right if performed; Future  -     Ambulatory referral to Physical Therapy; Future  -     Ambulatory referral to Orthopedic Surgery; Future    Chronic right shoulder pain  -     XR shoulder 2+ vw right; Future  -     Ambulatory referral to Physical Therapy; Future  -     Ambulatory referral to Orthopedic Surgery; Future             HPI:  Chief Complaint   Patient presents with    Follow-up     6 month check   Generalized Body Aches     Right shoulder pain, right hip pain and right knee pain that wakes her up at night   Poison Ivy     Both forearms from mowing  Patient also has bruises everywhere   Mass     Patient has bump on chest that she would like looked at  HPI   Patient is a 72 yo female who presents for routine check up but has multiple concerns listed above  She maintains follow up with cardiology for HTN and HLD, no changes   She is arraned to follow up with GI due to reflux, morning emesis  No bloody emesis  No difficulty with appetite throughout her day  She cannot stand smells in the morning, the smell of her own urine or feces makes her sick in the morning  She declines script for anti-nausea medication  She gets heart burn at times, cannot correlate to foods  Tums constipates her and she does not want to take ppi  She raises head of bed at night  She has poison ivy on forearms when goes out to mow the lawn  She used otc creams, unsure of name but it is a spray  She has skin lesion on chest  She declines derm  She wants to look into joint pain, holding her back from living  She has hx of R shoulder pain x few years  No previous injury  She has pain "deep in joint", pain in anterior shoulder  She has to conscious of how she reaches but ROM is intact  Sometimes has numbness or weakness in R upper arm  Since she is retired, has been able to rest it more  She has chronic R hip pain, just got back in last 6 months  No injury  This affects her walking, uses a cane at times  She has chronic R knee pain  She has previous xray and MRI of R knee showing complex meniscal tear  She had joint effusion  She has difficulty sleep at night, moving around due to joint pain  She is open to doing PT      ROS: Review of Systems   Constitutional: Negative for appetite change, diaphoresis and fever  HENT: Negative  Respiratory: Negative  Cardiovascular: Negative  Gastrointestinal: Positive for nausea and vomiting  Negative for constipation and diarrhea  Heartburn   Genitourinary: Negative  Musculoskeletal: Positive for arthralgias and gait problem  Negative for joint swelling  Skin: Positive for color change and rash  Neurological: Negative for dizziness, seizures, syncope and numbness         Past Medical History:   Diagnosis Date    Essential hypertension     Exertional chest pain     Hypothyroidism     Unstable angina pectoris ([facility] [address] ) 01/28/2020     Patient Active Problem List   Diagnosis    BMI 28 0-28 9,adult    Acute medial meniscus tear of right knee    Anxiety    Contact dermatitis and other eczema, due to unspecified cause    Depressive disorder    Essential hypertension    Reflux involving intestinal tract    Hypothyroidism    Internal derangement of right knee    Mixed hyperlipidemia    NSTEMI, initial episode of care [facility])    Coronary artery disease involving native coronary artery of native heart without angina pectoris       Past Surgical History:   Procedure Laterality Date    ANGIOPLASTY      CARDIAC CATHETERIZATION  01/22/2020    EF 75% There is a small LADD1 branch that has a severe stenosis  Given the small size of the vessel the plan will be to treat medically    TONSILLECTOMY         Social History     Socioeconomic History    Marital status: Single     Spouse name: None    Number of children: None    Years of education: None    Highest education level: None   Occupational History    None   Tobacco Use    Smoking status: Former Smoker    Smokeless tobacco: Never Used   Vaping Use    Vaping Use: Never used   Substance and Sexual Activity    Alcohol use: Not Currently    Drug use: Never    Sexual activity: Not Currently   Other Topics Concern    None   Social History Narrative    None     Social Determinants of Health     Financial Resource Strain:     Difficulty of Paying Living Expenses:    Food Insecurity:     Worried About Running Out of Food in the Last Year:     Ran Out of Food in the Last Year:    Transportation Needs: No Transportation Needs    Lack of Transportation (Medical): No    Lack of Transportation (Non-Medical):  No   Physical Activity:     Days of Exercise per Week:     Minutes of Exercise per Session:    Stress:     Feeling of Stress :    Social Connections:     Frequency of Communication with Friends and Family:     Frequency of Social Gatherings with Friends and Family:     Attends Jewish Services:     Active Member of Clubs or Organizations:     Attends Club or Organization Meetings:     Marital Status:    Intimate Partner Violence:     Fear of Current or Ex-Partner:     Emotionally Abused:     Physically Abused:     Sexually Abused:        Family History   Problem Relation Age of Onset    Thyroid disease Mother     Heart attack Father     Heart attack Brother     Thyroid disease Maternal Grandmother     Cancer Maternal Grandmother     Diabetes Maternal Grandfather     Heart attack Maternal Grandfather     Heart attack Paternal Grandfather     Heart attack Maternal Uncle        Allergies   Allergen Reactions    Other Hives and Rash     Adhesive tape           Current Outpatient Medications:     amLODIPine (NORVASC) 5 mg tablet, Take 1 tablet (5 mg total) by mouth daily, Disp: 90 tablet, Rfl: 1    ASPIRIN LOW DOSE 81 MG chewable tablet, Chew 81 mg every morning , Disp: , Rfl:     atorvastatin (LIPITOR) 80 mg tablet, Take 1 tablet (80 mg total) by mouth daily at bedtime, Disp: 90 tablet, Rfl: 1    ergocalciferol (VITAMIN D2) 50,000 units, Take 1 capsule (50,000 Units total) by mouth once a week, Disp: 12 capsule, Rfl: 0    levothyroxine 125 mcg tablet, Take 1 tablet (125 mcg total) by mouth daily, Disp: 90 tablet, Rfl: 1    venlafaxine (EFFEXOR) 75 mg tablet, Take 1 tablet by mouth twice daily, Disp: 90 tablet, Rfl: 1      Physical Exam:  /78 (BP Location: Left arm, Patient Position: Sitting, Cuff Size: Large)   Pulse 78   Temp 97 8 °F (36 6 °C) (Tympanic)   Ht 5' 9" (1 753 m)   Wt 87 1 kg (192 lb)   SpO2 97%   BMI 28 35 kg/m²     Physical Exam  Constitutional:       General: She is not in acute distress  Appearance: Normal appearance  HENT:      Head: Normocephalic and atraumatic        Right Ear: External ear normal       Left Ear: External ear normal       Nose: Nose normal       Mouth/Throat:      Mouth: Mucous membranes are moist       Pharynx: Oropharynx is clear  Eyes:      Conjunctiva/sclera: Conjunctivae normal       Pupils: Pupils are equal, round, and reactive to light  Cardiovascular:      Rate and Rhythm: Normal rate and regular rhythm  Heart sounds: No murmur heard  Pulmonary:      Effort: Pulmonary effort is normal  No respiratory distress  Breath sounds: Normal breath sounds  Abdominal:      General: There is no distension  Palpations: Abdomen is soft  Musculoskeletal:         General: No deformity or signs of injury  Cervical back: Normal range of motion and neck supple  No rigidity  Right lower leg: No edema  Left lower leg: No edema  Skin:     General: Skin is warm and dry  Coloration: Skin is not pale  Neurological:      General: No focal deficit present  Mental Status: She is alert and oriented to person, place, and time     Psychiatric:         Mood and Affect: Mood normal          Behavior: Behavior normal            Labs:  Lab Results   Component Value Date    WBC 8 66 05/18/2021    HGB 14 9 05/18/2021    HCT 45 6 05/18/2021    MCV 93 05/18/2021     05/18/2021     Lab Results   Component Value Date    K 3 8 05/18/2021     (H) 05/18/2021    CO2 25 05/18/2021    BUN 19 05/18/2021    CREATININE 0 67 05/18/2021    GLUF 116 (H) 05/18/2021    CALCIUM 9 2 05/18/2021    AST 18 05/18/2021    ALT 30 05/18/2021    ALKPHOS 80 05/18/2021    EGFR 93 05/18/2021

## 2021-08-16 ENCOUNTER — CONSULT (OUTPATIENT)
Dept: OBGYN CLINIC | Facility: CLINIC | Age: 66
End: 2021-08-16
Payer: MEDICARE

## 2021-08-16 VITALS
HEART RATE: 75 BPM | HEIGHT: 69 IN | BODY MASS INDEX: 28.44 KG/M2 | WEIGHT: 192 LBS | DIASTOLIC BLOOD PRESSURE: 77 MMHG | SYSTOLIC BLOOD PRESSURE: 145 MMHG

## 2021-08-16 DIAGNOSIS — Z87.828 HISTORY OF MENISCAL TEAR: ICD-10-CM

## 2021-08-16 DIAGNOSIS — M70.61 TROCHANTERIC BURSITIS OF RIGHT HIP: Primary | ICD-10-CM

## 2021-08-16 DIAGNOSIS — G89.29 CHRONIC RIGHT HIP PAIN: ICD-10-CM

## 2021-08-16 DIAGNOSIS — M25.551 CHRONIC RIGHT HIP PAIN: ICD-10-CM

## 2021-08-16 PROCEDURE — 99203 OFFICE O/P NEW LOW 30 MIN: CPT | Performed by: ORTHOPAEDIC SURGERY

## 2021-08-16 PROCEDURE — 20610 DRAIN/INJ JOINT/BURSA W/O US: CPT | Performed by: ORTHOPAEDIC SURGERY

## 2021-08-16 RX ORDER — METHYLPREDNISOLONE ACETATE 40 MG/ML
2 INJECTION, SUSPENSION INTRA-ARTICULAR; INTRALESIONAL; INTRAMUSCULAR; SOFT TISSUE
Status: COMPLETED | OUTPATIENT
Start: 2021-08-16 | End: 2021-08-16

## 2021-08-16 RX ORDER — BUPIVACAINE HYDROCHLORIDE 5 MG/ML
3.5 INJECTION, SOLUTION PERINEURAL
Status: COMPLETED | OUTPATIENT
Start: 2021-08-16 | End: 2021-08-16

## 2021-08-16 RX ADMIN — BUPIVACAINE HYDROCHLORIDE 3.5 ML: 5 INJECTION, SOLUTION PERINEURAL at 10:51

## 2021-08-16 RX ADMIN — METHYLPREDNISOLONE ACETATE 2 ML: 40 INJECTION, SUSPENSION INTRA-ARTICULAR; INTRALESIONAL; INTRAMUSCULAR; SOFT TISSUE at 10:51

## 2021-08-16 NOTE — PROGRESS NOTES
ASSESSMENT/PLAN:    Diagnoses and all orders for this visit:    Trochanteric bursitis of right hip    History of meniscal tear  -     Ambulatory referral to Orthopedic Surgery    Chronic right hip pain  -     Ambulatory referral to Orthopedic Surgery  -     XR hip/pelv 2-3 vws right if performed; Future        73 y o  female with right hip trochanteric bursitis  Patient was offered a corticosteroid injection about the right hip  Risks and benefits of the injection were discussed at length  Patient tolerated the injection well  I will see her back in office in 6 weeks for a re-evaluation of the right hip  Return in about 6 weeks (around 9/27/2021) for Recheck right hip        the trochanteric region was injected with Depo-Medrol and Marcaine  She tolerated see jessie quite well  Return back in 6 weeks for evaluation  Physical examination shows full range of motion along the right hip  There is, however, point tenderness along the trochanteric bursa  If there is any problems sooner, she will not hesitate to let us know      _____________________________________________________  CHIEF COMPLAINT:  Chief Complaint   Patient presents with    Right Hip - Pain         SUBJECTIVE:  Jojo Benitez is a 73 y o  female who presents to the office with right hip pain  Patient stated that she had no previous injuries about the right hip  She denied any previous injections about the right hip  She denied any numbness or tingling  She denied any fever or chills       The following portions of the patient's history were reviewed and updated as appropriate: allergies, current medications, past family history, past medical history, past social history, past surgical history and problem list     PAST MEDICAL HISTORY:  Past Medical History:   Diagnosis Date    Essential hypertension     Exertional chest pain     Hypothyroidism     Unstable angina pectoris ([facility] [address] ) 01/28/2020       PAST SURGICAL HISTORY:  Past Surgical History:   Procedure Laterality Date    ANGIOPLASTY      CARDIAC CATHETERIZATION  01/22/2020    EF 75% There is a small LADD1 branch that has a severe stenosis  Given the small size of the vessel the plan will be to treat medically    TONSILLECTOMY         FAMILY HISTORY:  Family History   Problem Relation Age of Onset    Thyroid disease Mother     Heart attack Father     Heart attack Brother     Thyroid disease Maternal Grandmother     Cancer Maternal Grandmother     Diabetes Maternal Grandfather     Heart attack Maternal Grandfather     Heart attack Paternal Grandfather     Heart attack Maternal Uncle        SOCIAL HISTORY:  Social History     Tobacco Use    Smoking status: Former Smoker    Smokeless tobacco: Never Used   Vaping Use    Vaping Use: Never used   Substance Use Topics    Alcohol use: Not Currently    Drug use: Never       MEDICATIONS:    Current Outpatient Medications:     amLODIPine (NORVASC) 5 mg tablet, Take 1 tablet (5 mg total) by mouth daily, Disp: 90 tablet, Rfl: 1    ASPIRIN LOW DOSE 81 MG chewable tablet, Chew 81 mg every morning , Disp: , Rfl:     atorvastatin (LIPITOR) 80 mg tablet, Take 1 tablet (80 mg total) by mouth daily at bedtime, Disp: 90 tablet, Rfl: 1    ergocalciferol (VITAMIN D2) 50,000 units, Take 1 capsule (50,000 Units total) by mouth once a week, Disp: 12 capsule, Rfl: 0    levothyroxine 125 mcg tablet, Take 1 tablet (125 mcg total) by mouth daily, Disp: 90 tablet, Rfl: 1    venlafaxine (EFFEXOR) 75 mg tablet, Take 1 tablet by mouth twice daily, Disp: 90 tablet, Rfl: 1    ALLERGIES:  Allergies   Allergen Reactions    Other Hives and Rash     Adhesive tape         ROS:  Review of Systems   Constitutional: Negative for chills and fever  HENT: Negative for ear pain and sore throat  Eyes: Negative for pain and visual disturbance  Respiratory: Negative for cough and shortness of breath  Cardiovascular: Negative for chest pain and palpitations  Last 1 Encounters:   08/16/21 145/77      Wt Readings from Last 1 Encounters:   08/16/21 87 1 kg (192 lb)        BMI:   Estimated body mass index is 28 35 kg/m² as calculated from the following:    Height as of this encounter: 5' 9" (1 753 m)  Weight as of this encounter: 87 1 kg (192 lb)  PROCEDURES PERFORMED:  Large joint arthrocentesis: R greater trochanteric bursa  Universal Protocol:  Consent: Verbal consent obtained  Risks and benefits: risks, benefits and alternatives were discussed  Consent given by: patient  Time out: Immediately prior to procedure a "time out" was called to verify the correct patient, procedure, equipment, support staff and site/side marked as required  Timeout called at: 8/16/2021 10:45 AM   Patient understanding: patient states understanding of the procedure being performed  Site marked: the operative site was marked  Patient identity confirmed: verbally with patient    Supporting Documentation  Indications: pain   Procedure Details  Location: hip - R greater trochanteric bursa  Preparation: Patient was prepped and draped in the usual sterile fashion  Needle gauge: spinal needle    Ultrasound guidance: no  Medications administered: 3 5 mL bupivacaine 0 5 %; 2 mL methylPREDNISolone acetate 40 mg/mL    Patient tolerance: patient tolerated the procedure well with no immediate complications  Dressing:  Sterile dressing applied                Scribe Attestation    I,:  Marta Guzmán am acting as a scribe while in the presence of the attending physician :       I,:  Judith Ariza DO personally performed the services described in this documentation    as scribed in my presence :

## 2021-09-29 ENCOUNTER — TELEPHONE (OUTPATIENT)
Dept: GASTROENTEROLOGY | Facility: CLINIC | Age: 66
End: 2021-09-29

## 2021-09-29 ENCOUNTER — CONSULT (OUTPATIENT)
Dept: GASTROENTEROLOGY | Facility: CLINIC | Age: 66
End: 2021-09-29
Payer: MEDICARE

## 2021-09-29 VITALS
RESPIRATION RATE: 18 BRPM | WEIGHT: 193.8 LBS | BODY MASS INDEX: 28.71 KG/M2 | HEART RATE: 82 BPM | TEMPERATURE: 98.5 F | SYSTOLIC BLOOD PRESSURE: 128 MMHG | HEIGHT: 69 IN | DIASTOLIC BLOOD PRESSURE: 76 MMHG

## 2021-09-29 DIAGNOSIS — E66.3 OVERWEIGHT (BMI 25.0-29.9): ICD-10-CM

## 2021-09-29 DIAGNOSIS — Z00.00 HEALTHCARE MAINTENANCE: ICD-10-CM

## 2021-09-29 DIAGNOSIS — K21.9 REFLUX INVOLVING INTESTINAL TRACT: ICD-10-CM

## 2021-09-29 DIAGNOSIS — K21.9 GASTROESOPHAGEAL REFLUX DISEASE, UNSPECIFIED WHETHER ESOPHAGITIS PRESENT: Primary | ICD-10-CM

## 2021-09-29 PROCEDURE — 99203 OFFICE O/P NEW LOW 30 MIN: CPT | Performed by: INTERNAL MEDICINE

## 2021-09-29 NOTE — TELEPHONE ENCOUNTER
Patients GI provider:  Dr Jameson Alcala    Number to return call: [phone]    Reason for call: Pt calling to schedule her scope based on todays OV       Scheduled procedure/appointment date if applicable: Apt/procedure NA

## 2021-09-29 NOTE — PROGRESS NOTES
[facility] - Outpatient Consultation  Merlyn Howard 73 y o  female MRN: [id number]  Encounter: [id number]          ASSESSMENT AND PLAN:      1  Gastroesophageal reflux disease, unspecified whether esophagitis present  Longstanding symptoms for >5 years with prior tobacco use history  She has no alarm features but is concerned regarding damage due to persistence of nausea/heartburn  Will plan for upper endoscopy to rule out Xavier's esophagus, malignancy, and esophagitis  At this time patient is not interested in PPI/other chronic medication  Will reassess need for this recommendation based on EGD findings    2  Healthcare maintenance  Hep C antibody negative  FIT testing for CRC screening negative 2/16/2020, negative cologuard 7/2021  Repeat cologuard would be due 2024, or other modality of CRC screening at that time    3  Reflux involving intestinal tract  As above  - Ambulatory referral to Gastroenterology    ______________________________________________________________________    HPI:  Ms Gama Henao is a pleasant 72 y/o female with past medical history of NSTEMI, hypothyroidisim, HTN, presenting for evaluation of nausea/vomiting and heartburn  Her symptoms are primarily in the morning  This has been ongoing for the past 6-7 years, with vomiting phlegm/clear fluid in the morning  She also states she has heartburn throughout the day sometimes (not daily) that is not correlated to PO intake  She denies weight loss, dysphagia, or hematemesis  She has taken TUMS as needed which does help, but believes this causes constipation  She has never taken a PPI regularly  There is no family history of esophageal cancer/significant esophageal disease  She had an EGD >20 years ago but does not recall the indication  REVIEW OF SYSTEMS:    CONSTITUTIONAL: Denies any fever, chills, rigors, and weight loss    HEENT: Denies odynophagia, tinnitus  CARDIOVASCULAR: No chest pain or palpitations  RESPIRATORY: Denies any cough, hemoptysis, shortness of breath or dyspnea on exertion  GASTROINTESTINAL: As noted in the History of Present Illness  GENITOURINARY: No problems with urination  Denies any hematuria or dysuria  NEUROLOGIC: No dizziness or vertigo, denies headaches  MUSCULOSKELETAL: Denies any muscle or joint pain  SKIN: Denies skin rashes or itching  ENDOCRINE:  Denies intolerance to heat or cold  PSYCHOSOCIAL: Denies depression or anxiety  Denies any recent memory loss  Historical Information   Past Medical History:   Diagnosis Date    Essential hypertension     Exertional chest pain     Hypothyroidism     Unstable angina pectoris ([facility] [address] ) 01/28/2020     Past Surgical History:   Procedure Laterality Date    ANGIOPLASTY      CARDIAC CATHETERIZATION  01/22/2020    EF 75% There is a small LADD1 branch that has a severe stenosis   Given the small size of the vessel the plan will be to treat medically    TONSILLECTOMY       Social History   Social History     Substance and Sexual Activity   Alcohol Use Not Currently     Social History     Substance and Sexual Activity   Drug Use Never     Social History     Tobacco Use   Smoking Status Former Smoker   Smokeless Tobacco Never Used     Family History   Problem Relation Age of Onset    Thyroid disease Mother     Heart attack Father     Heart attack Brother     Thyroid disease Maternal Grandmother     Cancer Maternal Grandmother     Diabetes Maternal Grandfather     Heart attack Maternal Grandfather     Heart attack Paternal Grandfather     Heart attack Maternal Uncle        Meds/Allergies       Current Outpatient Medications:     amLODIPine (NORVASC) 5 mg tablet    ASPIRIN LOW DOSE 81 MG chewable tablet    atorvastatin (LIPITOR) 80 mg tablet    ergocalciferol (VITAMIN D2) 50,000 units    levothyroxine 125 mcg tablet    venlafaxine (EFFEXOR) 75 mg tablet    Allergies   Allergen Reactions    Other Hives and Rash     Adhesive tape             Objective     Blood pressure 128/76, pulse 82, temperature 98 5 °F (36 9 °C), resp  rate 18, height 5' 9" (1 753 m), weight 87 9 kg (193 lb 12 8 oz)  Body mass index is 28 62 kg/m²  PHYSICAL EXAM:      General Appearance:   Alert, cooperative, no distress   HEENT:   Normocephalic, atraumatic, anicteric  Neck:  Supple, symmetrical, trachea midline   Lungs:   Clear to auscultation bilaterally; no rales, rhonchi or wheezing; respirations unlabored    Heart[de-identified]   Regular rate and rhythm; no murmur, rub, or gallop  Abdomen:   Soft, non-tender, non-distended; normal bowel sounds; no masses, no organomegaly    Genitalia:   Deferred    Rectal:   Deferred    Extremities:  No cyanosis, clubbing or edema    Pulses:  2+ and symmetric    Skin:  No jaundice, rashes, or lesions          Lab Results:   No visits with results within 1 Day(s) from this visit     Latest known visit with results is:   Appointment on 05/18/2021   Component Date Value    WBC 05/18/2021 8 66     RBC 05/18/2021 4 93     Hemoglobin 05/18/2021 14 9     Hematocrit 05/18/2021 45 6     MCV 05/18/2021 93     MCH 05/18/2021 30 2     MCHC 05/18/2021 32 7     RDW 05/18/2021 14 3     MPV 05/18/2021 11 8     Platelets 06/25/8804 238     nRBC 05/18/2021 0     Neutrophils Relative 05/18/2021 60     Immat GRANS % 05/18/2021 0     Lymphocytes Relative 05/18/2021 27     Monocytes Relative 05/18/2021 9     Eosinophils Relative 05/18/2021 3     Basophils Relative 05/18/2021 1     Neutrophils Absolute 05/18/2021 5 21     Immature Grans Absolute 05/18/2021 0 02     Lymphocytes Absolute 05/18/2021 2 35     Monocytes Absolute 05/18/2021 0 77     Eosinophils Absolute 05/18/2021 0 26     Basophils Absolute 05/18/2021 0 05     TSH 3RD GENERATON 05/18/2021 1 970     Free T4 05/18/2021 1 06     Cholesterol 05/18/2021 140     Triglycerides 05/18/2021 91     HDL, Direct 05/18/2021 43     LDL Calculated 05/18/2021 79     Non-HDL-Chol (CHOL-HDL) 05/18/2021 97     Sodium 05/18/2021 142     Potassium 05/18/2021 3 8     Chloride 05/18/2021 111*    CO2 05/18/2021 25     ANION GAP 05/18/2021 6     BUN 05/18/2021 19     Creatinine 05/18/2021 0 67     Glucose, Fasting 05/18/2021 116*    Calcium 05/18/2021 9 2     AST 05/18/2021 18     ALT 05/18/2021 30     Alkaline Phosphatase 05/18/2021 80     Total Protein 05/18/2021 7 1     Albumin 05/18/2021 3 7     Total Bilirubin 05/18/2021 0 56     eGFR 05/18/2021 93     Vit D, 25-Hydroxy 05/18/2021 15 7*         Radiology Results:   No results found

## 2021-09-30 NOTE — PATIENT INSTRUCTIONS
Pt is scheduled with dr Layne Orellana for an egd on 10/26/21 at , teresa went over instructions with pt in room and she has folder with instructions  Patient is aware she will need a  to and from   She will get a call the day before with an exact time for arrival

## 2021-10-04 DIAGNOSIS — F41.9 ANXIETY: ICD-10-CM

## 2021-10-04 DIAGNOSIS — F32.A DEPRESSIVE DISORDER: ICD-10-CM

## 2021-10-04 RX ORDER — VENLAFAXINE 75 MG/1
75 TABLET ORAL 2 TIMES DAILY
Qty: 180 TABLET | Refills: 1 | Status: SHIPPED | OUTPATIENT
Start: 2021-10-04 | End: 2022-02-16

## 2021-10-18 DIAGNOSIS — E78.2 MIXED HYPERLIPIDEMIA: ICD-10-CM

## 2021-10-18 DIAGNOSIS — I10 ESSENTIAL HYPERTENSION: ICD-10-CM

## 2021-10-18 DIAGNOSIS — E55.9 VITAMIN D DEFICIENCY: ICD-10-CM

## 2021-10-18 DIAGNOSIS — E03.9 HYPOTHYROIDISM, UNSPECIFIED TYPE: ICD-10-CM

## 2021-10-18 RX ORDER — ATORVASTATIN CALCIUM 80 MG/1
TABLET, FILM COATED ORAL
Qty: 90 TABLET | Refills: 0 | Status: SHIPPED | OUTPATIENT
Start: 2021-10-18 | End: 2022-02-11 | Stop reason: SDUPTHER

## 2021-10-18 RX ORDER — AMLODIPINE BESYLATE 5 MG/1
TABLET ORAL
Qty: 90 TABLET | Refills: 0 | Status: SHIPPED | OUTPATIENT
Start: 2021-10-18 | End: 2022-02-11 | Stop reason: SDUPTHER

## 2021-10-18 RX ORDER — LEVOTHYROXINE SODIUM 125 UG/1
TABLET ORAL
Qty: 90 TABLET | Refills: 0 | Status: SHIPPED | OUTPATIENT
Start: 2021-10-18 | End: 2022-02-11 | Stop reason: SDUPTHER

## 2021-10-19 RX ORDER — ERGOCALCIFEROL 1.25 MG/1
CAPSULE ORAL
Qty: 12 CAPSULE | Refills: 0 | Status: SHIPPED | OUTPATIENT
Start: 2021-10-19 | End: 2022-02-16

## 2021-10-25 ENCOUNTER — TELEPHONE (OUTPATIENT)
Dept: SURGERY | Facility: HOSPITAL | Age: 66
End: 2021-10-25

## 2021-10-26 ENCOUNTER — ANESTHESIA (OUTPATIENT)
Dept: GASTROENTEROLOGY | Facility: HOSPITAL | Age: 66
End: 2021-10-26

## 2021-10-26 ENCOUNTER — ANESTHESIA EVENT (OUTPATIENT)
Dept: GASTROENTEROLOGY | Facility: HOSPITAL | Age: 66
End: 2021-10-26

## 2021-10-26 ENCOUNTER — HOSPITAL ENCOUNTER (OUTPATIENT)
Dept: GASTROENTEROLOGY | Facility: HOSPITAL | Age: 66
Setting detail: OUTPATIENT SURGERY
Discharge: HOME | End: 2021-10-26
Attending: INTERNAL MEDICINE
Payer: MEDICARE

## 2021-10-26 VITALS
HEART RATE: 60 BPM | RESPIRATION RATE: 18 BRPM | SYSTOLIC BLOOD PRESSURE: 123 MMHG | OXYGEN SATURATION: 94 % | DIASTOLIC BLOOD PRESSURE: 69 MMHG | TEMPERATURE: 97 F

## 2021-10-26 DIAGNOSIS — K21.9 GASTROESOPHAGEAL REFLUX DISEASE, UNSPECIFIED WHETHER ESOPHAGITIS PRESENT: ICD-10-CM

## 2021-10-26 PROCEDURE — 88305 TISSUE EXAM BY PATHOLOGIST: CPT | Performed by: PATHOLOGY

## 2021-10-26 PROCEDURE — 43239 EGD BIOPSY SINGLE/MULTIPLE: CPT | Performed by: INTERNAL MEDICINE

## 2021-10-26 RX ORDER — OMEPRAZOLE 20 MG/1
20 CAPSULE, DELAYED RELEASE ORAL DAILY
Qty: 30 CAPSULE | Refills: 3 | Status: SHIPPED | OUTPATIENT
Start: 2021-10-26 | End: 2021-12-28 | Stop reason: SDUPTHER

## 2021-10-26 RX ORDER — KETAMINE HYDROCHLORIDE 50 MG/ML
INJECTION, SOLUTION, CONCENTRATE INTRAMUSCULAR; INTRAVENOUS AS NEEDED
Status: DISCONTINUED | OUTPATIENT
Start: 2021-10-26 | End: 2021-10-26

## 2021-10-26 RX ORDER — SODIUM CHLORIDE, SODIUM LACTATE, POTASSIUM CHLORIDE, CALCIUM CHLORIDE 600; 310; 30; 20 MG/100ML; MG/100ML; MG/100ML; MG/100ML
125 INJECTION, SOLUTION INTRAVENOUS CONTINUOUS
Status: DISCONTINUED | OUTPATIENT
Start: 2021-10-26 | End: 2021-10-30 | Stop reason: HOSPADM

## 2021-10-26 RX ORDER — PROPOFOL 10 MG/ML
INJECTION, EMULSION INTRAVENOUS AS NEEDED
Status: DISCONTINUED | OUTPATIENT
Start: 2021-10-26 | End: 2021-10-26

## 2021-10-26 RX ORDER — LIDOCAINE HYDROCHLORIDE 10 MG/ML
INJECTION, SOLUTION EPIDURAL; INFILTRATION; INTRACAUDAL; PERINEURAL AS NEEDED
Status: DISCONTINUED | OUTPATIENT
Start: 2021-10-26 | End: 2021-10-26

## 2021-10-26 RX ADMIN — PROPOFOL 30 MG: 10 INJECTION, EMULSION INTRAVENOUS at 08:16

## 2021-10-26 RX ADMIN — LIDOCAINE HYDROCHLORIDE 50 MG: 10 INJECTION, SOLUTION EPIDURAL; INFILTRATION; INTRACAUDAL; PERINEURAL at 08:09

## 2021-10-26 RX ADMIN — SODIUM CHLORIDE, SODIUM LACTATE, POTASSIUM CHLORIDE, AND CALCIUM CHLORIDE: .6; .31; .03; .02 INJECTION, SOLUTION INTRAVENOUS at 08:04

## 2021-10-26 RX ADMIN — PROPOFOL 100 MG: 10 INJECTION, EMULSION INTRAVENOUS at 08:09

## 2021-10-26 RX ADMIN — PROPOFOL 100 MG: 10 INJECTION, EMULSION INTRAVENOUS at 08:11

## 2021-10-26 RX ADMIN — SODIUM CHLORIDE, SODIUM LACTATE, POTASSIUM CHLORIDE, AND CALCIUM CHLORIDE 125 ML/HR: .6; .31; .03; .02 INJECTION, SOLUTION INTRAVENOUS at 07:41

## 2021-10-26 RX ADMIN — KETAMINE HYDROCHLORIDE 20 MG: 50 INJECTION, SOLUTION INTRAMUSCULAR; INTRAVENOUS at 08:12

## 2021-10-26 RX ADMIN — PROPOFOL 30 MG: 10 INJECTION, EMULSION INTRAVENOUS at 08:13

## 2021-11-29 ENCOUNTER — IMMUNIZATIONS (OUTPATIENT)
Dept: FAMILY MEDICINE CLINIC | Facility: HOSPITAL | Age: 66
End: 2021-11-29

## 2021-11-29 DIAGNOSIS — Z23 ENCOUNTER FOR IMMUNIZATION: Primary | ICD-10-CM

## 2021-11-29 PROCEDURE — 0064A COVID-19 MODERNA VACC 0.25 ML BOOSTER: CPT

## 2021-11-29 PROCEDURE — 91306 COVID-19 MODERNA VACC 0.25 ML BOOSTER: CPT

## 2021-12-24 ENCOUNTER — PATIENT MESSAGE (OUTPATIENT)
Dept: GASTROENTEROLOGY | Facility: CLINIC | Age: 66
End: 2021-12-24

## 2021-12-28 DIAGNOSIS — K21.9 GASTROESOPHAGEAL REFLUX DISEASE, UNSPECIFIED WHETHER ESOPHAGITIS PRESENT: Primary | ICD-10-CM

## 2021-12-28 RX ORDER — PANTOPRAZOLE SODIUM 40 MG/1
40 TABLET, DELAYED RELEASE ORAL DAILY
Qty: 30 TABLET | Refills: 3 | Status: SHIPPED | OUTPATIENT
Start: 2021-12-28 | End: 2022-05-03 | Stop reason: SDUPTHER

## 2022-02-06 ENCOUNTER — HOSPITAL ENCOUNTER (INPATIENT)
Facility: HOSPITAL | Age: 67
LOS: 2 days | Discharge: HOME | DRG: 084 | End: 2022-02-08
Attending: SURGERY | Admitting: SURGERY
Payer: COMMERCIAL

## 2022-02-06 ENCOUNTER — HOSPITAL ENCOUNTER (EMERGENCY)
Facility: HOSPITAL | Age: 67
End: 2022-02-06
Attending: EMERGENCY MEDICINE | Admitting: EMERGENCY MEDICINE
Payer: COMMERCIAL

## 2022-02-06 ENCOUNTER — APPOINTMENT (EMERGENCY)
Dept: RADIOLOGY | Facility: HOSPITAL | Age: 67
End: 2022-02-06
Payer: COMMERCIAL

## 2022-02-06 ENCOUNTER — APPOINTMENT (EMERGENCY)
Dept: CT IMAGING | Facility: HOSPITAL | Age: 67
End: 2022-02-06
Payer: COMMERCIAL

## 2022-02-06 VITALS
SYSTOLIC BLOOD PRESSURE: 111 MMHG | RESPIRATION RATE: 18 BRPM | HEART RATE: 88 BPM | OXYGEN SATURATION: 95 % | DIASTOLIC BLOOD PRESSURE: 66 MMHG | TEMPERATURE: 97.8 F
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
## 2022-02-06 DIAGNOSIS — I60.9 SAH (SUBARACHNOID HEMORRHAGE) (HCC): Primary | ICD-10-CM

## 2022-02-06 DIAGNOSIS — R56.9 SEIZURE (HCC): ICD-10-CM

## 2022-02-06 DIAGNOSIS — I60.9 SUBARACHNOID HEMORRHAGE (HCC): Primary | ICD-10-CM

## 2022-02-06 DIAGNOSIS — I60.9 SAH (SUBARACHNOID HEMORRHAGE) (HCC): ICD-10-CM
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
## 2022-02-06 LAB
ANION GAP SERPL CALCULATED.3IONS-SCNC: 6 MMOL/L (ref 4–13)
BASOPHILS # BLD AUTO: 0.05 THOUSANDS/ΜL (ref 0–0.1)
BASOPHILS NFR BLD AUTO: 0 % (ref 0–1)
BUN SERPL-MCNC: 15 MG/DL (ref 5–25)
CALCIUM SERPL-MCNC: 9.2 MG/DL (ref 8.4–10.2)
CHLORIDE SERPL-SCNC: 103 MMOL/L (ref 96–108)
CO2 SERPL-SCNC: 30 MMOL/L (ref 21–32)
CREAT SERPL-MCNC: 0.83 MG/DL (ref 0.6–1.3)
EOSINOPHIL # BLD AUTO: 0.07 THOUSAND/ΜL (ref 0–0.61)
EOSINOPHIL NFR BLD AUTO: 1 % (ref 0–6)
ERYTHROCYTE [DISTWIDTH] IN BLOOD BY AUTOMATED COUNT: 14.3 % (ref 11.6–15.1)
GFR SERPL CREATININE-BSD FRML MDRD: 74 ML/MIN/1.73SQ M
GLUCOSE SERPL-MCNC: 102 MG/DL (ref 65–140)
HCT VFR BLD AUTO: 46.4 % (ref 34.8–46.1)
HGB BLD-MCNC: 15.1 G/DL (ref 11.5–15.4)
IMM GRANULOCYTES # BLD AUTO: 0.03 THOUSAND/UL (ref 0–0.2)
IMM GRANULOCYTES NFR BLD AUTO: 0 % (ref 0–2)
LYMPHOCYTES # BLD AUTO: 1.28 THOUSANDS/ΜL (ref 0.6–4.47)
LYMPHOCYTES NFR BLD AUTO: 11 % (ref 14–44)
MCH RBC QN AUTO: 30 PG (ref 26.8–34.3)
MCHC RBC AUTO-ENTMCNC: 32.5 G/DL (ref 31.4–37.4)
MCV RBC AUTO: 92 FL (ref 82–98)
MONOCYTES # BLD AUTO: 0.73 THOUSAND/ΜL (ref 0.17–1.22)
MONOCYTES NFR BLD AUTO: 6 % (ref 4–12)
NEUTROPHILS # BLD AUTO: 9.69 THOUSANDS/ΜL (ref 1.85–7.62)
NEUTS SEG NFR BLD AUTO: 82 % (ref 43–75)
NRBC BLD AUTO-RTO: 0 /100 WBCS
PLATELET # BLD AUTO: 213 THOUSANDS/UL (ref 149–390)
PMV BLD AUTO: 10.7 FL (ref 8.9–12.7)
POTASSIUM SERPL-SCNC: 4 MMOL/L (ref 3.5–5.3)
RBC # BLD AUTO: 5.04 MILLION/UL (ref 3.81–5.12)
SODIUM SERPL-SCNC: 139 MMOL/L (ref 135–147)
WBC # BLD AUTO: 11.85 THOUSAND/UL (ref 4.31–10.16)

## 2022-02-06 PROCEDURE — 99285 EMERGENCY DEPT VISIT HI MDM: CPT

## 2022-02-06 PROCEDURE — 74177 CT ABD & PELVIS W/CONTRAST: CPT

## 2022-02-06 PROCEDURE — 96365 THER/PROPH/DIAG IV INF INIT: CPT

## 2022-02-06 PROCEDURE — 71260 CT THORAX DX C+: CPT

## 2022-02-06 PROCEDURE — 70450 CT HEAD/BRAIN W/O DYE: CPT

## 2022-02-06 PROCEDURE — G0390 TRAUMA RESPONS W/HOSP CRITI: HCPCS

## 2022-02-06 PROCEDURE — 71045 X-RAY EXAM CHEST 1 VIEW: CPT

## 2022-02-06 PROCEDURE — 99291 CRITICAL CARE FIRST HOUR: CPT | Performed by: EMERGENCY MEDICINE

## 2022-02-06 PROCEDURE — 99223 1ST HOSP IP/OBS HIGH 75: CPT | Performed by: SURGERY

## 2022-02-06 PROCEDURE — 85025 COMPLETE CBC W/AUTO DIFF WBC: CPT | Performed by: EMERGENCY MEDICINE

## 2022-02-06 PROCEDURE — 96375 TX/PRO/DX INJ NEW DRUG ADDON: CPT

## 2022-02-06 PROCEDURE — 80048 BASIC METABOLIC PNL TOTAL CA: CPT | Performed by: EMERGENCY MEDICINE

## 2022-02-06 PROCEDURE — 72125 CT NECK SPINE W/O DYE: CPT

## 2022-02-06 PROCEDURE — 36415 COLL VENOUS BLD VENIPUNCTURE: CPT | Performed by: EMERGENCY MEDICINE

## 2022-02-06 PROCEDURE — 99291 CRITICAL CARE FIRST HOUR: CPT

## 2022-02-06 RX ORDER — ACETAMINOPHEN 325 MG/1
975 TABLET ORAL EVERY 8 HOURS PRN
Status: DISCONTINUED | OUTPATIENT
Start: 2022-02-06 | End: 2022-02-07

## 2022-02-06 RX ORDER — PANTOPRAZOLE SODIUM 40 MG/1
40 TABLET, DELAYED RELEASE ORAL DAILY
Status: DISCONTINUED | OUTPATIENT
Start: 2022-02-07 | End: 2022-02-08 | Stop reason: HOSPADM

## 2022-02-06 RX ORDER — VENLAFAXINE 37.5 MG/1
75 TABLET ORAL 2 TIMES DAILY
Status: DISCONTINUED | OUTPATIENT
Start: 2022-02-06 | End: 2022-02-08 | Stop reason: HOSPADM

## 2022-02-06 RX ORDER — LEVETIRACETAM 500 MG/1
500 TABLET ORAL 2 TIMES DAILY
Status: DISCONTINUED | OUTPATIENT
Start: 2022-02-06 | End: 2022-02-08 | Stop reason: HOSPADM

## 2022-02-06 RX ORDER — LEVOTHYROXINE SODIUM 0.12 MG/1
125 TABLET ORAL
Status: DISCONTINUED | OUTPATIENT
Start: 2022-02-07 | End: 2022-02-08 | Stop reason: HOSPADM

## 2022-02-06 RX ORDER — AMLODIPINE BESYLATE 5 MG/1
5 TABLET ORAL DAILY
Status: DISCONTINUED | OUTPATIENT
Start: 2022-02-07 | End: 2022-02-08 | Stop reason: HOSPADM

## 2022-02-06 RX ORDER — ONDANSETRON 2 MG/ML
4 INJECTION INTRAMUSCULAR; INTRAVENOUS EVERY 6 HOURS PRN
Status: DISCONTINUED | OUTPATIENT
Start: 2022-02-06 | End: 2022-02-08 | Stop reason: HOSPADM

## 2022-02-06 RX ORDER — ACETAMINOPHEN 325 MG/1
975 TABLET ORAL ONCE
Status: DISCONTINUED | OUTPATIENT
Start: 2022-02-06 | End: 2022-02-06 | Stop reason: HOSPADM

## 2022-02-06 RX ORDER — ATORVASTATIN CALCIUM 80 MG/1
80 TABLET, FILM COATED ORAL
Status: DISCONTINUED | OUTPATIENT
Start: 2022-02-06 | End: 2022-02-08 | Stop reason: HOSPADM

## 2022-02-06 RX ORDER — SENNOSIDES 8.6 MG
2 TABLET ORAL DAILY
Status: DISCONTINUED | OUTPATIENT
Start: 2022-02-07 | End: 2022-02-08 | Stop reason: HOSPADM

## 2022-02-06 RX ORDER — HYDRALAZINE HYDROCHLORIDE 20 MG/ML
10 INJECTION INTRAMUSCULAR; INTRAVENOUS ONCE
Status: COMPLETED | OUTPATIENT
Start: 2022-02-06 | End: 2022-02-06

## 2022-02-06 RX ADMIN — LEVETIRACETAM 1500 MG: 100 INJECTION, SOLUTION INTRAVENOUS at 15:53

## 2022-02-06 RX ADMIN — DESMOPRESSIN ACETATE 35.2 MCG: 4 SOLUTION INTRAVENOUS at 16:41

## 2022-02-06 RX ADMIN — HYDRALAZINE HYDROCHLORIDE 10 MG: 20 INJECTION, SOLUTION INTRAMUSCULAR; INTRAVENOUS at 16:47

## 2022-02-06 RX ADMIN — LEVETIRACETAM 500 MG: 500 TABLET, FILM COATED ORAL at 21:17

## 2022-02-06 RX ADMIN — VENLAFAXINE 75 MG: 37.5 TABLET ORAL at 21:17

## 2022-02-06 RX ADMIN — IOHEXOL 100 ML: 350 INJECTION, SOLUTION INTRAVENOUS at 14:22

## 2022-02-06 NOTE — Clinical Note
Case was discussed with SHREYA and the patient's admission status was agreed to be Admission Status: inpatient status to the service of Dr Jerry Dinh

## 2022-02-06 NOTE — ED PROVIDER NOTES
Emergency Department Trauma Note  Gab Valderrama 73 y o  female MRN: [id number]  Unit/Bed#: Z2 H2/Z2 H2 Encounter: [id number]      Trauma Alert: Trauma Acuity: Trauma Evaluation  Model of Arrival:   via    Trauma Team: Current Providers  Attending Provider: Deidra Marrufo MD  Registered Nurse: Brianne Teague RN  Consultants: None      History of Present Illness     Chief Complaint:   Chief Complaint   Patient presents with   Josias Kristian     Patient arrived via EMS  Per EMS patient fell on walking trail, had a seizure unknown how long, head strike, positive BT       HPI:  Gab Valderrama is a 73 y o  female who presents with   Mechanism:Details of Incident: Patient fell on walking trail, hit head, positive BT, seizure Injury Date: 02/06/22        Patient is a 52-year-old female on 81 mg aspirin who presents for evaluation of fall with head trauma  Patient was apparently hiking nearby and slipped on ice  Bystander said that she suffered a seizure for an undisclosed amount of time  No known seizure history  Patient was initially slightly confused per EMS put is back to baseline on my exam   Patient does complain of a mild dull/achy constant headache without alleviating chest pain factors previous that received anything for her pain  She otherwise denies neck pain, chest pain, dyspnea, abdominal pain  She does not recall the event  She also had 1 episode of vomiting though she currently does not feel nauseous any longer  Review of Systems   Constitutional: Negative for fever  HENT: Negative for sore throat  Respiratory: Negative for shortness of breath  Cardiovascular: Negative for chest pain  Gastrointestinal: Negative for abdominal pain  Genitourinary: Negative for dysuria  Musculoskeletal: Negative for back pain  Skin: Negative for rash  Neurological: Positive for headaches  Negative for light-headedness  Psychiatric/Behavioral: Negative for agitation     All other systems reviewed and are negative  Historical Information     Immunizations:   Immunization History   Administered Date(s) Administered    COVID-19 MODERNA VACC 0 25 ML IM BOOSTER 11/29/2021    COVID-19 MODERNA VACC 0 5 ML IM 03/18/2021, 04/16/2021    Lyme Disease 08/01/2003    Pneumococcal Polysaccharide PPV23 01/29/2020    Rotavirus Tetravalent 08/01/2016    Tuberculin Skin Test-PPD Intradermal 05/19/2011    Unknown 08/01/2016       Past Medical History:   Diagnosis Date    Essential hypertension     Exertional chest pain     Hypothyroidism     Unstable angina pectoris ([address] ) 01/28/2020       Family History   Problem Relation Age of Onset    Thyroid disease Mother     Heart attack Father     Heart attack Brother     Thyroid disease Maternal Grandmother     Cancer Maternal Grandmother     Diabetes Maternal Grandfather     Heart attack Maternal Grandfather     Heart attack Paternal Grandfather     Heart attack Maternal Uncle      Past Surgical History:   Procedure Laterality Date    ANGIOPLASTY      CARDIAC CATHETERIZATION  01/22/2020    EF 75% There is a small LADD1 branch that has a severe stenosis  Given the small size of the vessel the plan will be to treat medically    TONSILLECTOMY       Social History     Tobacco Use    Smoking status: Former Smoker    Smokeless tobacco: Never Used   Vaping Use    Vaping Use: Never used   Substance Use Topics    Alcohol use: Not Currently    Drug use: Never     E-Cigarette/Vaping    E-Cigarette Use Never User      E-Cigarette/Vaping Substances    Nicotine No     THC No     CBD No     Flavoring No     Other No     Unknown No        Family History: non-contributory    Meds/Allergies   Prior to Admission Medications   Prescriptions Last Dose Informant Patient Reported? Taking?    ASPIRIN LOW DOSE 81 MG chewable tablet   Yes No   Sig: Chew 81 mg every morning    Euthyrox 125 MCG tablet   No No   Sig: Take 1 tablet by mouth once daily   amLODIPine (NORVASC) 5 mg tablet   No No   Sig: Take 1 tablet by mouth once daily   atorvastatin (LIPITOR) 80 mg tablet   No No   Sig: TAKE 1 TABLET BY MOUTH ONCE DAILY AT BEDTIME   ergocalciferol (VITAMIN D2) 50,000 units   No No   Sig: Take 1 capsule by mouth once a week   pantoprazole (PROTONIX) 40 mg tablet   No No   Sig: Take 1 tablet (40 mg total) by mouth daily   venlafaxine (EFFEXOR) 75 mg tablet   No No   Sig: Take 1 tablet (75 mg total) by mouth 2 (two) times a day      Facility-Administered Medications: None       Allergies   Allergen Reactions    Other Hives and Rash     Adhesive tape         PHYSICAL EXAM    PE limited by: NA    Objective   Vitals:   First set: Temperature: (!) 97 °F (36 1 °C) (02/06/22 1351)  Pulse: 65 (02/06/22 1350)  Respirations: 18 (02/06/22 1350)  Blood Pressure: (!) 181/90 (02/06/22 1351)  SpO2: 97 % (02/06/22 1350)    Primary Survey:   (A) Airway:  Intact  (B) Breathing:  Bilateral breath sounds  (C) Circulation: Pulses:   normal  (D) Disabliity:  GCS Total:  15  (E) Expose:  Completed    Secondary Survey: (Click on Physical Exam tab above)  Physical Exam  Vitals reviewed  Constitutional:       General: She is not in acute distress  Appearance: She is well-developed  HENT:      Head: Normocephalic  Eyes:      Pupils: Pupils are equal, round, and reactive to light  Cardiovascular:      Rate and Rhythm: Normal rate and regular rhythm  Heart sounds: Normal heart sounds  Pulmonary:      Effort: Pulmonary effort is normal       Breath sounds: Normal breath sounds  Abdominal:      General: Bowel sounds are normal  There is no distension  Palpations: Abdomen is soft  Tenderness: There is no abdominal tenderness  There is no guarding  Musculoskeletal:         General: No tenderness or deformity  Normal range of motion  Cervical back: Normal range of motion and neck supple  Skin:     General: Skin is warm and dry        Capillary Refill: Capillary refill takes less than 2 seconds  Neurological:      Mental Status: She is alert and oriented to person, place, and time  Cranial Nerves: No cranial nerve deficit  Sensory: No sensory deficit  Comments: Alert oriented x3  GCS 15  Cranial nerves 2-12 intact  Strength 5/5 in all 4 extremities  Sensation intact throughout  Psychiatric:         Behavior: Behavior normal          Thought Content: Thought content normal          Judgment: Judgment normal          Cervical spine cleared by clinical criteria?  No (imaging required)      Invasive Devices  Report    Peripheral Intravenous Line            Peripheral IV 02/06/22 Left Antecubital <1 day                Lab Results:   Results Reviewed     Procedure Component Value Units Date/Time    Basic metabolic panel [[id number]] Collected: 02/06/22 1457    Lab Status: Final result Specimen: Blood from Arm, Left Updated: 02/06/22 1519     Sodium 139 mmol/L      Potassium 4 0 mmol/L      Chloride 103 mmol/L      CO2 30 mmol/L      ANION GAP 6 mmol/L      BUN 15 mg/dL      Creatinine 0 83 mg/dL      Glucose 102 mg/dL      Calcium 9 2 mg/dL      eGFR 74 ml/min/1 73sq m     Narrative:      [place] guidelines for Chronic Kidney Disease (CKD):     Stage 1 with normal or high GFR (GFR > 90 mL/min/1 73 square meters)    Stage 2 Mild CKD (GFR = 60-89 mL/min/1 73 square meters)    Stage 3A Moderate CKD (GFR = 45-59 mL/min/1 73 square meters)    Stage 3B Moderate CKD (GFR = 30-44 mL/min/1 73 square meters)    Stage 4 Severe CKD (GFR = 15-29 mL/min/1 73 square meters)    Stage 5 End Stage CKD (GFR <15 mL/min/1 73 square meters)  Note: GFR calculation is accurate only with a steady state creatinine    CBC and differential [[id number]]  (Abnormal) Collected: 02/06/22 1457    Lab Status: Final result Specimen: Blood from Arm, Left Updated: 02/06/22 1503     WBC 11 85 Thousand/uL      RBC 5 04 Million/uL      Hemoglobin 15 1 g/dL      Hematocrit 46 4 %      MCV 92 fL      MCH 30 0 pg      MCHC 32 5 g/dL      RDW 14 3 %      MPV 10 7 fL      Platelets 157 Thousands/uL      nRBC 0 /100 WBCs      Neutrophils Relative 82 %      Immat GRANS % 0 %      Lymphocytes Relative 11 %      Monocytes Relative 6 %      Eosinophils Relative 1 %      Basophils Relative 0 %      Neutrophils Absolute 9 69 Thousands/µL      Immature Grans Absolute 0 03 Thousand/uL      Lymphocytes Absolute 1 28 Thousands/µL      Monocytes Absolute 0 73 Thousand/µL      Eosinophils Absolute 0 07 Thousand/µL      Basophils Absolute 0 05 Thousands/µL                  Imaging Studies:   Direct to CT: No  TRAUMA - CT head wo contrast   Final Result by Lum Landau, MD (02/06 1510)   1  Small acute subarachnoid hemorrhage in the left superior frontal region  2  Posterior scalp hematoma  I personally discussed this study with Dr Jessica Gunderson on 2/6/2022 at 3:10 PM                      Workstation performed: [id number]         TRAUMA - CT spine cervical wo contrast   Final Result by Lum Landau, MD (02/06 1511)      No cervical spine fracture or traumatic malalignment  I personally discussed this study with Dr Jessica Gunderson on 2/6/2022 at 3:10 PM              Workstation performed: [id number]         TRAUMA - CT chest abdomen pelvis w contrast   Final Result by Lum Landau, MD (02/06 1511)   1  No acute traumatic findings  2  Bilateral renal cysts cysts        I personally discussed this study with Dr Jessica Gunderson on 2/6/2022 at 3:10 PM             Workstation performed: [id number]         XR Trauma chest portable    (Results Pending)         Procedures  CriticalCare Time  Performed by: Katina Mendoza MD  Authorized by: Katina Mendoza MD     Critical care provider statement:     Critical care time (minutes):  40    Critical care time was exclusive of:  Separately billable procedures and treating other patients and teaching time    Critical care was necessary to treat or prevent imminent or life-threatening deterioration of the following conditions:  Trauma    Critical care was time spent personally by me on the following activities:  Blood draw for specimens, obtaining history from patient or surrogate, development of treatment plan with patient or surrogate, discussions with consultants, evaluation of patient's response to treatment, examination of patient, review of old charts, re-evaluation of patient's condition, ordering and review of radiographic studies, ordering and review of laboratory studies and ordering and performing treatments and interventions  Comments:      Subarachnoid hemorrhage  POC FAST     Date/Time: 2/6/2022 4:10 PM  Performed by: Perico Thacker MD  Authorized by: Perico Thacker MD     Patient location:  ED  Other Assisting Provider: No    Procedure details:     Exam Type:  Diagnostic    Indications: blunt abdominal trauma and blunt chest trauma      Assess for:  Hemothorax, intra-abdominal fluid, pericardial effusion and pneumothorax    Technique: extended FAST      Views obtained:  Heart - Pericardial sac, RUQ - Preston's Pouch, LUQ - Splenorenal space, Right thorax and Left thorax    Image quality: diagnostic      Image availability:  Not obtained due to urgency  FAST Findings:     RUQ (Hepatorenal) free fluid: absent      LUQ (Splenorenal) free fluid: absent      Suprapubic free fluid: absent      Cardiac wall motion: identified      Pericardial effusion: absent    extended FAST (Pulmonary) findings:     Left lung sliding: Present      Right lung sliding: Present      Left pleural effusion: Absent      Right pleural effusion: Absent    Interpretation:     Impressions: negative    Comments:      Questionable renal cysts             ED Course           MDM  Number of Diagnoses or Management Options  Fall, initial encounter  SAH (subarachnoid hemorrhage) ([facility] [address] )  Seizure ([facility] [address] )  Diagnosis management comments: Patient is a 61-year-old female who presents for evaluation of fall with subsequent posttraumatic seizure and vomiting found have subarachnoid hemorrhage  Patient given DDAVP to reverse 81 mg aspirin, Keppra for further seizure prophylaxis  Patient's blood pressure was 160/87, will give 1 dose of hydralazine to decrease blood pressure to goal 971 systolic  Accepted for transfer to [facility] for trauma evaluation  Disposition  [facility] Transfer: No  Final diagnoses:   SAH (subarachnoid hemorrhage) ([facility] [address] )   Fall, initial encounter   Seizure [facility])     Time reflects when diagnosis was documented in both MDM as applicable and the Disposition within this note     Time User Action Codes Description Comment    2/6/2022  3:16 PM Rayna Carpio [I60 [id number]] [facility]     2/6/2022  3:16 PM Violetta Simms Fall, initial encounter     2/6/2022  3:16 PM Patt Mccray [R56 [id number]] Seizure [facility])       ED Disposition     ED Disposition Condition Date/Time Comment    Transfer to Another Facility-In [facility]  Sun Feb 6, 2022  3:16 PM Transferred to [facility] for trauma service        Follow-up Information    None       Patient's Medications   Discharge Prescriptions    No medications on file     No discharge procedures on file      PDMP Review     None          ED Provider  Electronically Signed by         Barbara Patel MD  02/06/22 4535

## 2022-02-06 NOTE — EMTALA/ACUTE CARE TRANSFER
[address] [facility] [address]  Dept: [phone]      EMTALA TRANSFER CONSENT    NAME Abril QUISPE 1955                              MRN [id number]    I have been informed of my rights regarding examination, treatment, and transfer   by Dr Jessenia Arellano MD    Benefits:      Risks:        Consent for Transfer:  I acknowledge that my medical condition has been evaluated and explained to me by the emergency department physician or other qualified medical person and/or my attending physician, who has recommended that I be transferred to the service of    at    The above potential benefits of such transfer, the potential risks associated with such transfer, and the probable risks of not being transferred have been explained to me, and I fully understand them  The doctor has explained that, in my case, the benefits of transfer outweigh the risks  I agree to be transferred  I authorize the performance of emergency medical procedures and treatments upon me in both transit and upon arrival at the receiving facility  Additionally, I authorize the release of any and all medical records to the receiving facility and request they be transported with me, if possible  I understand that the safest mode of transportation during a medical emergency is an ambulance and that the Hospital advocates the use of this mode of transport  Risks of traveling to the receiving facility by car, including absence of medical control, life sustaining equipment, such as oxygen, and medical personnel has been explained to me and I fully understand them  (ALICIA CORRECT BOX BELOW)  [  ]  I consent to the stated transfer and to be transported by ambulance/helicopter  [  ]  I consent to the stated transfer, but refuse transportation by ambulance and accept full responsibility for my transportation by car    I understand the risks of non-ambulance transfers and I exonerate the Hospital and its staff from any deterioration in my condition that results from this refusal     X___________________________________________    DATE  22  TIME________  Signature of patient or legally responsible individual signing on patient behalf           RELATIONSHIP TO PATIENT_________________________          Provider Certification    NAME Shabbir Venegas                                         1955                              MRN [id number]    A medical screening exam was performed on the above named patient  Based on the examination:    Condition Necessitating Transfer The primary encounter diagnosis was SAH (subarachnoid hemorrhage) ([facility] [address] )  Diagnoses of Fall, initial encounter and Seizure [facility]) were also pertinent to this visit  Patient Condition:      Reason for Transfer:      Transfer Requirements: Facility     · Space available and qualified personnel available for treatment as acknowledged by    · Agreed to accept transfer and to provide appropriate medical treatment as acknowledged by          · Appropriate medical records of the examination and treatment of the patient are provided at the time of transfer   [address] _______  · Transfer will be performed by qualified personnel from    and appropriate transfer equipment as required, including the use of necessary and appropriate life support measures      Provider Certification: I have examined the patient and explained the following risks and benefits of being transferred/refusing transfer to the patient/family:         Based on these reasonable risks and benefits to the patient and/or the unborn child(daphnie), and based upon the information available at the time of the patients examination, I certify that the medical benefits reasonably to be expected from the provision of appropriate medical treatments at another medical facility outweigh the increasing risks, if any, to the individuals medical condition, and in the case of labor to the unborn child, from effecting the transfer      X____________________________________________ DATE 02/06/22        TIME_______      ORIGINAL - SEND TO MEDICAL RECORDS   COPY - SEND WITH PATIENT DURING TRANSFER

## 2022-02-06 NOTE — TRAUMA DOCUMENTATION
While trying to place the patient on the bedpan the patient yelled at this RN, "What the fuck is wrong with you? I told you I am very modest  If you touch me one more fucking time I will fucking punch you in the face"  ED charge nurse and ED physician made aware    Patient is argumentative when asked to follow any request

## 2022-02-07 ENCOUNTER — APPOINTMENT (INPATIENT)
Dept: RADIOLOGY | Facility: HOSPITAL | Age: 67
DRG: 084 | End: 2022-02-07
Payer: COMMERCIAL

## 2022-02-07 LAB
ANION GAP SERPL CALCULATED.3IONS-SCNC: 4 MMOL/L (ref 4–13)
BASOPHILS # BLD AUTO: 0.04 THOUSANDS/ΜL (ref 0–0.1)
BASOPHILS NFR BLD AUTO: 0 % (ref 0–1)
BUN SERPL-MCNC: 14 MG/DL (ref 5–25)
CALCIUM SERPL-MCNC: 9.1 MG/DL (ref 8.3–10.1)
CHLORIDE SERPL-SCNC: 109 MMOL/L (ref 100–108)
CO2 SERPL-SCNC: 25 MMOL/L (ref 21–32)
CREAT SERPL-MCNC: 0.77 MG/DL (ref 0.6–1.3)
EOSINOPHIL # BLD AUTO: 0.05 THOUSAND/ΜL (ref 0–0.61)
EOSINOPHIL NFR BLD AUTO: 1 % (ref 0–6)
ERYTHROCYTE [DISTWIDTH] IN BLOOD BY AUTOMATED COUNT: 14.6 % (ref 11.6–15.1)
GFR SERPL CREATININE-BSD FRML MDRD: 81 ML/MIN/1.73SQ M
GLUCOSE SERPL-MCNC: 101 MG/DL (ref 65–140)
HCT VFR BLD AUTO: 44.3 % (ref 34.8–46.1)
HGB BLD-MCNC: 14.6 G/DL (ref 11.5–15.4)
IMM GRANULOCYTES # BLD AUTO: 0.03 THOUSAND/UL (ref 0–0.2)
IMM GRANULOCYTES NFR BLD AUTO: 0 % (ref 0–2)
LYMPHOCYTES # BLD AUTO: 1.73 THOUSANDS/ΜL (ref 0.6–4.47)
LYMPHOCYTES NFR BLD AUTO: 19 % (ref 14–44)
MCH RBC QN AUTO: 30.3 PG (ref 26.8–34.3)
MCHC RBC AUTO-ENTMCNC: 33 G/DL (ref 31.4–37.4)
MCV RBC AUTO: 92 FL (ref 82–98)
MONOCYTES # BLD AUTO: 0.83 THOUSAND/ΜL (ref 0.17–1.22)
MONOCYTES NFR BLD AUTO: 9 % (ref 4–12)
NEUTROPHILS # BLD AUTO: 6.37 THOUSANDS/ΜL (ref 1.85–7.62)
NEUTS SEG NFR BLD AUTO: 71 % (ref 43–75)
NRBC BLD AUTO-RTO: 0 /100 WBCS
PLATELET # BLD AUTO: 224 THOUSANDS/UL (ref 149–390)
PMV BLD AUTO: 11 FL (ref 8.9–12.7)
POTASSIUM SERPL-SCNC: 4 MMOL/L (ref 3.5–5.3)
RBC # BLD AUTO: 4.82 MILLION/UL (ref 3.81–5.12)
SODIUM SERPL-SCNC: 138 MMOL/L (ref 136–145)
WBC # BLD AUTO: 9.05 THOUSAND/UL (ref 4.31–10.16)

## 2022-02-07 PROCEDURE — 36415 COLL VENOUS BLD VENIPUNCTURE: CPT | Performed by: EMERGENCY MEDICINE

## 2022-02-07 PROCEDURE — 99223 1ST HOSP IP/OBS HIGH 75: CPT | Performed by: NEUROLOGICAL SURGERY

## 2022-02-07 PROCEDURE — 97163 PT EVAL HIGH COMPLEX 45 MIN: CPT

## 2022-02-07 PROCEDURE — 70450 CT HEAD/BRAIN W/O DYE: CPT

## 2022-02-07 PROCEDURE — 85025 COMPLETE CBC W/AUTO DIFF WBC: CPT | Performed by: EMERGENCY MEDICINE

## 2022-02-07 PROCEDURE — 99232 SBSQ HOSP IP/OBS MODERATE 35: CPT | Performed by: SURGERY

## 2022-02-07 PROCEDURE — 97166 OT EVAL MOD COMPLEX 45 MIN: CPT

## 2022-02-07 PROCEDURE — G1004 CDSM NDSC: HCPCS

## 2022-02-07 PROCEDURE — 80048 BASIC METABOLIC PNL TOTAL CA: CPT | Performed by: EMERGENCY MEDICINE

## 2022-02-07 RX ORDER — ACETAMINOPHEN 325 MG/1
975 TABLET ORAL EVERY 8 HOURS SCHEDULED
Status: DISCONTINUED | OUTPATIENT
Start: 2022-02-07 | End: 2022-02-08 | Stop reason: HOSPADM

## 2022-02-07 RX ADMIN — PANTOPRAZOLE SODIUM 40 MG: 40 TABLET, DELAYED RELEASE ORAL at 09:30

## 2022-02-07 RX ADMIN — VENLAFAXINE 75 MG: 37.5 TABLET ORAL at 18:06

## 2022-02-07 RX ADMIN — LEVOTHYROXINE SODIUM 125 MCG: 125 TABLET ORAL at 05:45

## 2022-02-07 RX ADMIN — ACETAMINOPHEN 975 MG: 325 TABLET, FILM COATED ORAL at 07:24

## 2022-02-07 RX ADMIN — ONDANSETRON 4 MG: 2 INJECTION INTRAMUSCULAR; INTRAVENOUS at 00:11

## 2022-02-07 RX ADMIN — ATORVASTATIN CALCIUM 80 MG: 20 TABLET, FILM COATED ORAL at 00:36

## 2022-02-07 RX ADMIN — LEVETIRACETAM 500 MG: 500 TABLET, FILM COATED ORAL at 09:30

## 2022-02-07 RX ADMIN — ONDANSETRON 4 MG: 2 INJECTION INTRAMUSCULAR; INTRAVENOUS at 15:39

## 2022-02-07 RX ADMIN — VENLAFAXINE 75 MG: 37.5 TABLET ORAL at 11:20

## 2022-02-07 RX ADMIN — ACETAMINOPHEN 975 MG: 325 TABLET, FILM COATED ORAL at 18:05

## 2022-02-07 RX ADMIN — LEVETIRACETAM 500 MG: 500 TABLET, FILM COATED ORAL at 18:06

## 2022-02-07 RX ADMIN — AMLODIPINE BESYLATE 5 MG: 5 TABLET ORAL at 09:30

## 2022-02-07 NOTE — PLAN OF CARE
Problem: Potential for Falls  Goal: Patient will remain free of falls  Description: INTERVENTIONS:  - Educate patient/family on patient safety including physical limitations  - Instruct patient to call for assistance with activity   - Consult OT/PT to assist with strengthening/mobility   - Keep Call bell within reach  - Keep bed low and locked with side rails adjusted as appropriate  - Keep care items and personal belongings within reach  - Initiate and maintain comfort rounds  - Make Fall Risk Sign visible to staff  - Offer Toileting every 2 Hours, in advance of need  - Initiate/Maintain bed alarm  - Obtain necessary fall risk management equipment:  - Apply yellow socks and bracelet for high fall risk patients  - Consider moving patient to room near nurses station  Outcome: Progressing     Problem: PAIN - ADULT  Goal: Verbalizes/displays adequate comfort level or baseline comfort level  Description: Interventions:  - Encourage patient to monitor pain and request assistance  - Assess pain using appropriate pain scale  - Administer analgesics based on type and severity of pain and evaluate response  - Implement non-pharmacological measures as appropriate and evaluate response  - Consider cultural and social influences on pain and pain management  - Notify physician/advanced practitioner if interventions unsuccessful or patient reports new pain  Outcome: Progressing     Problem: INFECTION - ADULT  Goal: Absence or prevention of progression during hospitalization  Description: INTERVENTIONS:  - Assess and monitor for signs and symptoms of infection  - Monitor lab/diagnostic results  - Monitor all insertion sites, i e  indwelling lines, tubes, and drains  - Monitor endotracheal if appropriate and nasal secretions for changes in amount and color  - [place] appropriate cooling/warming therapies per order  - Administer medications as ordered  - Instruct and encourage patient and family to use good hand hygiene technique  - Identify and instruct in appropriate isolation precautions for identified infection/condition  Outcome: Progressing     Problem: SAFETY ADULT  Goal: Patient will remain free of falls  Description: INTERVENTIONS:  - Educate patient/family on patient safety including physical limitations  - Instruct patient to call for assistance with activity   - Consult OT/PT to assist with strengthening/mobility   - Keep Call bell within reach  - Keep bed low and locked with side rails adjusted as appropriate  - Keep care items and personal belongings within reach  - Initiate and maintain comfort rounds  - Make Fall Risk Sign visible to staff  - Offer Toileting every 2 Hours, in advance of need  - Initiate/Maintain bed alarm  - Obtain necessary fall risk management equipment:  - Apply yellow socks and bracelet for high fall risk patients  - Consider moving patient to room near nurses station  Outcome: Progressing  Goal: Maintain or return to baseline ADL function  Description: INTERVENTIONS:  -  Assess patient's ability to carry out ADLs; assess patient's baseline for ADL function and identify physical deficits which impact ability to perform ADLs (bathing, care of mouth/teeth, toileting, grooming, dressing, etc )  - Assess/evaluate cause of self-care deficits   - Assess range of motion  - Assess patient's mobility; develop plan if impaired  - Assess patient's need for assistive devices and provide as appropriate  - Encourage maximum independence but intervene and supervise when necessary  - Involve family in performance of ADLs  - Assess for home care needs following discharge   - Consider OT consult to assist with ADL evaluation and planning for discharge  - Provide patient education as appropriate  Outcome: Progressing  Goal: Maintains/Returns to pre admission functional level  Description: INTERVENTIONS:  - Perform BMAT or MOVE assessment daily    - Set and communicate daily mobility goal to care team and patient/family/caregiver  - Collaborate with rehabilitation services on mobility goals if consulted  - Ambulate patient 3 times a day  - Out of bed to chair 3 times a day   - Out of bed for toileting  - Record patient progress and toleration of activity level   Outcome: Progressing     Problem: DISCHARGE PLANNING  Goal: Discharge to home or other facility with appropriate resources  Description: INTERVENTIONS:  - Identify barriers to discharge w/patient and caregiver  - Arrange for needed discharge resources and transportation as appropriate  - Identify discharge learning needs (meds, wound care, etc )  - Arrange for interpretive services to assist at discharge as needed  - Refer to Case Management Department for coordinating discharge planning if the patient needs post-hospital services based on physician/advanced practitioner order or complex needs related to functional status, cognitive ability, or social support system  Outcome: Progressing     Problem: Knowledge Deficit  Goal: Patient/family/caregiver demonstrates understanding of disease process, treatment plan, medications, and discharge instructions  Description: Complete learning assessment and assess knowledge base    Interventions:  - Provide teaching at level of understanding  - Provide teaching via preferred learning methods  Outcome: Progressing     Problem: NEUROSENSORY - ADULT  Goal: Achieves stable or improved neurological status  Description: INTERVENTIONS  - Monitor and report changes in neurological status  - Monitor vital signs such as temperature, blood pressure, glucose, and any other labs ordered   - Initiate measures to prevent increased intracranial pressure  - Monitor for seizure activity and implement precautions if appropriate      Outcome: Progressing  Goal: Remains free of injury related to seizures activity  Description: INTERVENTIONS  - Maintain airway, patient safety  and administer oxygen as ordered  - Monitor patient for seizure activity, document and report duration and description of seizure to physician/advanced practitioner  - If seizure occurs,  ensure patient safety during seizure  - Reorient patient post seizure  - Seizure pads on all 4 side rails  - Instruct patient/family to notify RN of any seizure activity including if an aura is experienced  - Instruct patient/family to call for assistance with activity based on nursing assessment  - Administer anti-seizure medications if ordered    Outcome: Progressing  Goal: Achieves maximal functionality and self care  Description: INTERVENTIONS  - Monitor swallowing and airway patency with patient fatigue and changes in neurological status  - Encourage and assist patient to increase activity and self care     - Encourage visually impaired, hearing impaired and aphasic patients to use assistive/communication devices  Outcome: Progressing

## 2022-02-07 NOTE — NURSING NOTE
Patient refusing full skin assessment, specifically of buttocks   Patient educated on importance of assessing skin and states "my butt is just fine"

## 2022-02-07 NOTE — ASSESSMENT & PLAN NOTE
- S/p DDAVP 2/6  - HOT protocol - discontinued  - CTH in AM, or with change in GCS  - NSG consult - completed  - Keppra for seizure prophylaxis

## 2022-02-07 NOTE — CONSULTS
25 June Augusta 1955, 73 y o  female MRN: [id number]  Unit/Bed#: [facility] 623-01 Encounter: [id number]  Primary Care Provider: Tiffanie Harper PA-C   Date and time admitted to hospital: 2/6/2022  6:52 PM    Consult was completed on 02/07/2022 at 10:47 a m  Inpatient consult to Neurosurgery  Consult performed by: COMFORT Gonzalez  Consult ordered by: Ingris Cortez MD          [facility] (subarachnoid hemorrhage) [facility])  Assessment & Plan  Left superior frontal SAH  · Status post slip and fall on ice with positive head strike and LOC on aspirin  Status post DDAVP  · Questionable seizure-like activity after fall witnessed by friends    Imaging:    CT head wo 2/6/22:Small acute subarachnoid hemorrhage in the left superior frontal region  Posterior scalp hematoma  CT head wo 2/7/22:Stable small focus of subarachnoid hemorrhage within the anterior left frontal vertex  No new hemorrhage  Extracranial hematoma superficial to the occipital bone posteriorly is slightly larger than the prior exam     Plan:  · Continue frequent neurological checks  · Reviewed imaging with patient  · STAT CT head with any neurological decline including drop GCS of 2pts within 1 hr   · Recommend SBP <160mmHg  · Seizure prophylaxis per Trauma, patient currently on Keppra 500 mg b i d   · Hold all antiplatelet and anticoagulation medications  · Medical management pain control per primary team  · Mobilize with PT/OT  · DVT PPX: SCDs and okay from neurosurgical standpoint for pharmacological DVT prophylaxis  · No neurosurgical intervention indicated at this juncture  Neurosurgery will sign off, patient will follow-up in 2 weeks with repeat CT head, call with any further questions or concerns        Fall from slipping on ice  Assessment & Plan  Continue fall precautions    History of Present Illness     HPI: Gab Valderrama is a 73 y o   female with PMH significant for hypertension and hypothyroidism  Patient originally presented to [address] ED after she sustained a slip and fall on ice while walking on a walking trail with positive head strike and LOC on aspirin, status post DDAVP  Per chart review and patient seizure-like activity was witnessed by her friends after fall  Imaging demonstrated small subarachnoid hemorrhage in the left superior frontal region, patient was transferred to [facility] for further evaluation and workup  Patient reports she was out walking on a trail with her friends which was flat but icy  Patient states she remembers waking up on the ground and did vomit once  She reports her friends stated she had seizure-like activity  Patient currently complaining of 2-3/10 frontal headache, patient states her current pain regimen helps with her pain  She does endorse taking aspirin 81 mg  She also reports tinnitus at baseline  She denies any dizziness, blurry vision, chest pain, shortness of breath, abdominal pain, nausea, vomiting, diarrhea, no problems bowel or bladder, no new weakness or numbness/tingling  Review of Systems   Constitutional: Positive for activity change  Negative for chills and fever  HENT: Positive for tinnitus  Negative for trouble swallowing  Eyes: Negative for visual disturbance  Respiratory: Negative for shortness of breath  Cardiovascular: Negative for chest pain  Gastrointestinal: Negative for abdominal pain, constipation, diarrhea, nausea and vomiting  Genitourinary: Negative for difficulty urinating  Musculoskeletal: Negative for gait problem  Skin: Positive for wound (Posterior scalp hematoma noted)  Neurological: Positive for headaches  Negative for dizziness, speech difficulty, weakness, light-headedness and numbness  Hematological: Bruises/bleeds easily ( on aspirin)         Historical Information   Past Medical History:   Diagnosis Date    Essential hypertension     Exertional chest pain     Hypothyroidism     Unstable angina pectoris ([facility] [address] ) 01/28/2020     Past Surgical History:   Procedure Laterality Date    ANGIOPLASTY      CARDIAC CATHETERIZATION  01/22/2020    EF 75% There is a small LADD1 branch that has a severe stenosis  Given the small size of the vessel the plan will be to treat medically    TONSILLECTOMY       Social History     Substance and Sexual Activity   Alcohol Use Not Currently     Social History     Substance and Sexual Activity   Drug Use Never     Social History     Tobacco Use   Smoking Status Former Smoker   Smokeless Tobacco Never Used     Family History   Problem Relation Age of Onset    Thyroid disease Mother     Heart attack Father     Heart attack Brother     Thyroid disease Maternal Grandmother     Cancer Maternal Grandmother     Diabetes Maternal Grandfather     Heart attack Maternal Grandfather     Heart attack Paternal Grandfather     Heart attack Maternal Uncle        Meds/Allergies   all current active meds have been reviewed, current meds:   Current Facility-Administered Medications   Medication Dose Route Frequency    acetaminophen (TYLENOL) tablet 975 mg  975 mg Oral Q8H PRN    amLODIPine (NORVASC) tablet 5 mg  5 mg Oral Daily    atorvastatin (LIPITOR) tablet 80 mg  80 mg Oral HS    levETIRAcetam (KEPPRA) tablet 500 mg  500 mg Oral BID    levothyroxine tablet 125 mcg  125 mcg Oral Early Morning    ondansetron (ZOFRAN) injection 4 mg  4 mg Intravenous Q6H PRN    pantoprazole (PROTONIX) EC tablet 40 mg  40 mg Oral Daily    senna (SENOKOT) tablet 17 2 mg  2 tablet Oral Daily    venlafaxine (EFFEXOR) tablet 75 mg  75 mg Oral BID    and PTA meds:   Prior to Admission Medications   Prescriptions Last Dose Informant Patient Reported? Taking?    ASPIRIN LOW DOSE 81 MG chewable tablet   Yes No   Sig: Chew 81 mg every morning    Euthyrox 125 MCG tablet   No No   Sig: Take 1 tablet by mouth once daily   amLODIPine (NORVASC) 5 mg tablet   No No   Sig: Take 1 tablet by mouth once daily   atorvastatin (LIPITOR) 80 mg tablet   No No   Sig: TAKE 1 TABLET BY MOUTH ONCE DAILY AT BEDTIME   ergocalciferol (VITAMIN D2) 50,000 units   No No   Sig: Take 1 capsule by mouth once a week   pantoprazole (PROTONIX) 40 mg tablet   No No   Sig: Take 1 tablet (40 mg total) by mouth daily   venlafaxine (EFFEXOR) 75 mg tablet   No No   Sig: Take 1 tablet (75 mg total) by mouth 2 (two) times a day      Facility-Administered Medications: None     Allergies   Allergen Reactions    Other Hives and Rash     Adhesive tape         Objective   I/O       02/05 0701 02/06 0700 02/06 0701 02/07 0700 02/07 0701 02/08 0700    P  O    240    Total Intake(mL/kg)   240 (2 6)    Net   +240           Unmeasured Urine Occurrence   1 x          Physical Exam  Vitals reviewed  Constitutional:       General: She is awake  She is not in acute distress  Appearance: Normal appearance  She is well-developed  She is not ill-appearing  HENT:      Head: Normocephalic and atraumatic  Comments: Posterior scalp hematoma noted  Eyes:      Extraocular Movements: Extraocular movements intact and EOM normal       Conjunctiva/sclera: Conjunctivae normal       Pupils: Pupils are equal, round, and reactive to light  Cardiovascular:      Rate and Rhythm: Normal rate  Pulmonary:      Effort: Pulmonary effort is normal  No respiratory distress  Chest:      Chest wall: No tenderness  Abdominal:      General: There is no distension  Palpations: Abdomen is soft  Tenderness: There is no abdominal tenderness  Musculoskeletal:         General: Normal range of motion  Cervical back: Normal range of motion and neck supple  No spinous process tenderness or muscular tenderness  Thoracic back: No tenderness  Lumbar back: No tenderness  Skin:     General: Skin is warm and dry  Neurological:      Mental Status: She is alert and oriented to person, place, and time  Coordination: Finger-Nose-Finger Test normal       Gait: Gait is intact  Deep Tendon Reflexes: Strength normal       Reflex Scores:       Bicep reflexes are 2+ on the right side and 2+ on the left side  Patellar reflexes are 2+ on the right side and 2+ on the left side  Psychiatric:         Attention and Perception: Attention and perception normal          Mood and Affect: Mood and affect normal          Speech: Speech normal          Behavior: Behavior normal  Behavior is cooperative  Thought Content: Thought content normal          Cognition and Memory: Cognition and memory normal          Judgment: Judgment normal        Neurologic Exam     Mental Status   Oriented to person, place, and time  Follows 2 step commands  Attention: normal  Concentration: normal    Speech: speech is normal   Level of consciousness: alert  Knowledge: good  Able to perform simple calculations  Able to name object  Able to repeat  Normal comprehension  Cranial Nerves     CN III, IV, VI   Pupils are equal, round, and reactive to light  Extraocular motions are normal    CN III: no CN III palsy  CN VI: no CN VI palsy  Nystagmus: none   Diplopia: none  Conjugate gaze: present    CN V   Facial sensation intact  CN VII   Facial expression full, symmetric  CN VIII   CN VIII normal    Hearing: intact    CN IX, X   CN IX normal      CN XI   CN XI normal      CN XII   CN XII normal      Motor Exam   Muscle bulk: normal  Overall muscle tone: normal  Right arm pronator drift: absent  Left arm pronator drift: absent    Strength   Strength 5/5 throughout       Sensory Exam   Light touch normal    Proprioception normal    JPS and DST intact     Gait, Coordination, and Reflexes     Gait  Gait: normal    Coordination   Finger to nose coordination: normal    Tremor   Resting tremor: absent  Intention tremor: absent  Action tremor: absent    Reflexes   Right biceps: 2+  Left biceps: 2+  Right patellar: 2+  Left patellar: 2+  Right Mayen: absent  Left Mayen: absent  Right ankle clonus: absent  Left ankle clonus: absent      Vitals:Blood pressure 122/70, pulse 73, temperature 98 5 °F (36 9 °C), resp  rate 20, height 5' 9" (1 753 m), weight 90 7 kg (200 lb), SpO2 93 %  ,Body mass index is 29 53 kg/m²  Lab Results:   Results from last 7 days   Lab Units 02/07/22  0438 02/06/22  1457   WBC Thousand/uL 9 05 11 85*   HEMOGLOBIN g/dL 14 6 15 1   HEMATOCRIT % 44 3 46 4*   PLATELETS Thousands/uL 224 213   NEUTROS PCT % 71 82*   MONOS PCT % 9 6     Results from last 7 days   Lab Units 02/07/22  0438 02/06/22  1457   POTASSIUM mmol/L 4 0 4 0   CHLORIDE mmol/L 109* 103   CO2 mmol/L 25 30   BUN mg/dL 14 15   CREATININE mg/dL 0 77 0 83   CALCIUM mg/dL 9 1 9 2                 No results found for: TROPONINT  ABG:No results found for: PHART, EMT0YNQ, PO2ART, ITS9UKO, D1JABYAU, BEART, SOURCE    Imaging Studies: I have personally reviewed pertinent reports  and I have personally reviewed pertinent films in PACS    EKG, Pathology, and Other Studies: I have personally reviewed pertinent reports        VTE Prophylaxis: Sequential compression device Emma Prather     Code Status: Level 1 - Full Code  Advance Directive and Living Will:      Power of :    POLST:

## 2022-02-07 NOTE — PHYSICAL THERAPY NOTE
PHYSICAL THERAPY EVALUATION  NAME:  America Mcdaniel  DATE: 02/07/22    AGE:   73 y o  Mrn:   [id number]  ADMIT DX:  Subarachnoid hemorrhage ([facility] [address] ) [I60 9]  Unspecified multiple injuries, initial encounter [T07  XXXA]    Past Medical History:   Diagnosis Date    Essential hypertension     Exertional chest pain     Hypothyroidism     Unstable angina pectoris ([facility] [address] ) 01/28/2020     Past Surgical History:   Procedure Laterality Date    ANGIOPLASTY      CARDIAC CATHETERIZATION  01/22/2020    EF 75% There is a small LADD1 branch that has a severe stenosis   Given the small size of the vessel the plan will be to treat medically    TONSILLECTOMY         Length Of Stay: 1  PHYSICAL THERAPY EVALUATION :    02/07/22 1155   PT Last Visit   PT Visit Date 02/07/22   Note Type   Note type Evaluation   Pain Assessment   Pain Assessment Tool 0-10   Pain Score 4   Pain Location/Orientation Location: Head   Patient's Stated Pain Goal No pain   Hospital Pain Intervention(s) Repositioned   Restrictions/Precautions   Weight Bearing Precautions Per Order No   Braces or Orthoses   (NONE denies )   Other Precautions Pain   Home Living   Type of [address] [place] [address] Multi-level;1/2 bath on main level  (2+1 INOCENCIO)   Bathroom Shower/Tub Tub/shower unit   Bathroom Toilet Standard   Bathroom Equipment Shower chair   [address]   (shower chair PTA)   Prior Function   Level of [place] Independent with ADLs and functional mobility   Lives With Alone   Receives Help From Neighbor;Friend(s)   ADL Assistance Independent   IADLs Independent   Falls in the last 6 months 1 to 4  (7- [address] )   Vocational Retired  ( then - retired)   Comments pt reports being [employer] w/o AD (+) driving PTA-    Cognition   Overall Cognitive Status WFL   Attention Within functional limits   Orientation Level Oriented X4   Memory Decreased recall of recent events   Following Commands Follows all commands and directions without difficulty   RUE Assessment   RUE Assessment WFL   LUE Assessment   LUE Assessment WFL   RLE Assessment   RLE Assessment WFL   LLE Assessment   LLE Assessment WFL   Vision-Basic Assessment   Current Vision   (denies photophobia)   Coordination   Movements are Fluid and Coordinated 1   Sensation WFL   Finger to Nose & Finger to Finger  Intact   Heel to Shin Intact   Light Touch   RLE Light Touch Grossly intact   LLE Light Touch Grossly intact   Bed Mobility   Supine to Sit 5  Supervision   Additional items Increased time required   Additional Comments OOB in reclienr post session w all needs inr each    Transfers   Sit to Stand 5  Supervision   Stand to Sit 5  Supervision   Ambulation/Elevation   Gait pattern WNL   Gait Assistance 5  Supervision  (distant S w/o AD- 0 dizziness 0 LOB; )   Assistive Device None   Distance > 350 + stairs- able to turn 360 and negotiate obstacles; single leg stance and reach out of PATRICIA w/o LOB; pt reports at baseline and w/o concerns for mobility at this time    Stair Management Assistance 5  Supervision   Stair Management Technique One rail R;Foreward; Alternating pattern   Number of Stairs 10   Balance   Static Sitting Good   Static Standing Fair +   Ambulatory Fair   Activity Tolerance   Activity Tolerance Patient tolerated treatment well   Assessment   Prognosis Good   Assessment Pt is 73 y o  female seen for PT evaluation s/p admit to [facility] on 2/6/2022  Pt presenting as [facility]fer [facility] s/p fall on ice w/ headstrike and LOC; witnessed seizure like activity; Shelvy Mingle Current dx/ problem list includes: SAH; Comorbidities affecting pt's physical performance at time of assessment listed above and significant for:  has a past medical history of Essential hypertension, Exertional chest pain, Hypothyroidism, and Unstable angina pectoris ([facility] [address] ) (01/28/2020)    Personal factors affecting pt at time of IE include: steps to enter environment, limited home support,  Decreased recall of events; Due to acute medical issues, monitoring s/p fall w/ head strike; ongoing medical workup for primary dx; pain, baseline photophobia; decreased activity tolerance compared to baseline, increased assistance at current time, continuous  monitoring, lines, decline in overall functional mobility status;   note potentially unstable clinical picture (high complexity)   Prior to admission, pt reports being fully indep; driving and living alone in a multistory home w/ bath on each level; pt is retired and has friends neighbors assisting w/ pets currently  Currently pt  is requiring S A for bed skills; S for functional transfers and S w/o AD for ambulation > 300' + stair negotiation; pt denies dizziness w/ mobility and head motions - reports no concerns for d/c or w/ mobility reporting she is near baseline  Education provided on fall prevention strategies and safety  Pt presents functioning at near baseline currently w/ overall mobility deficits 2* to: fatigue; headache ; baseline photophobia; (Please find additional objective findings from PT assessment regarding body systems outlined above ) Pt will continue to benefit from skilled PT interventions to address stated impairments; to maximize functional potential; for ongoing pt/ family training; and DME needs  PT is currently recommending d/c to home w/ inc family / friends support on d/c   Restorative/ ambulation at least 3x daily until time of d/c          Barriers to Discharge None   Goals   Patient Goals to go home   Recommendation   PT Discharge Recommendation No rehabilitation needs  (educated to make all f/u appointments- driving recs per trau)   Equipment Recommended   (none at this time )   [address] in Bed Without Bedrails 4   Lying on Back to Sitting on Edge of Flat Bed 4   Moving Bed to Chair 4   Standing Up From Chair 4   Walk in Room 4   Climb 3-5 Stairs 4   Basic Mobility Inpatient Raw Score 24   Basic Mobility Standardized Score 57 68   Highest Level Of Mobility   -[facility] Goal 8: Walk 250 feet or more   -[facility] Highest Level of Mobility 8: Walk 250 feet ot more   -[facility] Goal Achieved Yes   Modified Angie Scale   Modified Angie Scale 1     Pt is 73 y o  female seen for PT evaluation s/p admit to [facility] on 2/6/2022  Pt presenting as xfer from [facility] s/p fall on ice w/ headstrike and LOC; witnessed seizure like activity; Katherine Sundar Current dx/ problem list includes: SAH; Comorbidities affecting pt's physical performance at time of assessment listed above and significant for:  has a past medical history of Essential hypertension, Exertional chest pain, Hypothyroidism, and Unstable angina pectoris ([facility] [address] ) (01/28/2020)    Personal factors affecting pt at time of IE include: steps to enter environment, limited home support,  Decreased recall of events; Due to acute medical issues, monitoring s/p fall w/ head strike; ongoing medical workup for primary dx; pain, baseline photophobia; decreased activity tolerance compared to baseline, increased assistance at current time, continuous  monitoring, lines, decline in overall functional mobility status;   note potentially unstable clinical picture (high complexity)   Prior to admission, pt reports being fully indep; driving and living alone in a multistory home w/ bath on each level; pt is retired and has friends neighbors assisting w/ pets currently  Currently pt  is requiring S A for bed skills; S for functional transfers and S w/o AD for ambulation > 300' + stair negotiation; pt denies dizziness w/ mobility and head motions - reports no concerns for d/c or w/ mobility reporting she is near baseline   Education provided on fall prevention strategies and safety  Pt presents functioning at near baseline currently w/ overall mobility deficits 2* to: fatigue; headache ; baseline photophobia; (Please find additional objective findings from PT assessment regarding body systems outlined above ) Pt will continue to benefit from skilled PT interventions to address stated impairments; to maximize functional potential; for ongoing pt/ family training; and DME needs  PT is currently recommending d/c to home w/ inc family / friends support on d/c  Restorative/ ambulation at least 3x daily until time of d/c

## 2022-02-07 NOTE — ASSESSMENT & PLAN NOTE
Left superior frontal SAH  · Status post slip and fall on ice with positive head strike and LOC on aspirin  Status post DDAVP  · Questionable seizure-like activity after fall witnessed by friends    Imaging:    CT head wo 2/6/22:Small acute subarachnoid hemorrhage in the left superior frontal region  Posterior scalp hematoma  CT head wo 2/7/22:Stable small focus of subarachnoid hemorrhage within the anterior left frontal vertex  No new hemorrhage  Extracranial hematoma superficial to the occipital bone posteriorly is slightly larger than the prior exam     Plan:  · Continue frequent neurological checks  · Reviewed imaging with patient  · STAT CT head with any neurological decline including drop GCS of 2pts within 1 hr   · Recommend SBP <160mmHg  · Seizure prophylaxis per Trauma, patient currently on Keppra 500 mg b i d   · Hold all antiplatelet and anticoagulation medications  · Medical management pain control per primary team  · Mobilize with PT/OT  · DVT PPX: SCDs and okay from neurosurgical standpoint for pharmacological DVT prophylaxis  · No neurosurgical intervention indicated at this juncture  Neurosurgery will sign off, patient will follow-up in 2 weeks with repeat CT head, call with any further questions or concerns

## 2022-02-07 NOTE — OCCUPATIONAL THERAPY NOTE
Occupational Therapy Evaluation     Patient Name: Mercedes Wade  RLARH'D Date: 2/7/2022  Problem List  Active Problems:    Fall from slipping on ice    SAH (subarachnoid hemorrhage) [facility])    Past Medical History  Past Medical History:   Diagnosis Date    Essential hypertension     Exertional chest pain     Hypothyroidism     Unstable angina pectoris ([address] ) 01/28/2020     Past Surgical History  Past Surgical History:   Procedure Laterality Date    ANGIOPLASTY      CARDIAC CATHETERIZATION  01/22/2020    EF 75% There is a small LADD1 branch that has a severe stenosis  Given the small size of the vessel the plan will be to treat medically    TONSILLECTOMY           02/07/22 1152   OT Last Visit   OT Visit Date 02/07/22   Note Type   Note type Evaluation   Restrictions/Precautions   Weight Bearing Precautions Per Order No   Other Precautions Pain   Pain Assessment   Pain Assessment Tool 0-10   Pain Score 4   Pain Location/Orientation Location: Head;Location: Neck   Patient's Stated Pain Goal No pain   Hospital Pain Intervention(s) Repositioned; Ambulation/increased activity; Emotional support   Home Living   Type of [address] [place] [address] Multi-level;Stairs to enter with rails;1/2 bath on main level  (2+1 INOCENCIO )   Bathroom Shower/Tub Tub/shower unit   Bathroom Toilet Standard   Bathroom Equipment Shower chair   Bathroom Accessibility Accessible   Additional Comments + USE OF SC FOR COMFORT PTA   Prior Function   Level of [place] Independent with ADLs and functional mobility   Lives With Alone   Receives Help From Neighbor;Friend(s)   ADL Assistance Independent   IADLs Independent   Falls in the last 6 months 1 to 4  (7- [address] )   Vocational Retired   Lifestyle   Autonomy  [facility] ADLS/IADLS/DRIVING PTA    Reciprocal Relationships LIVES ALONE   PT REPORTS FACETIMING WITH FRIENDS DAILY HOWEVER REPORTS MOSTLY STAYING TO HERSELF BECAUSE "THATS THE WAY SHE LIKES IT"   Service to Others RETIRED; [employer] OFFICER   Intrinsic Gratification ENJOYS BEING WITH HER ANIMALS    Psychosocial   Psychosocial (WDL) WDL   ADL   Eating Assistance 7  Independent   Grooming Assistance 7  Independent   UB Bathing Assistance 5  Supervision/Setup   LB Bathing Assistance 5  Supervision/Setup   UB Dressing Assistance 5  Supervision/Setup   LB Dressing Assistance 5  Supervision/Setup   Toileting Assistance  5  Supervision/Setup   Functional Assistance 5  Supervision/Setup   Bed Mobility   Supine to Sit 5  Supervision   Additional items Increased time required   Sit to Supine Unable to assess  (PT LEFT OOB WITH ALL NEEDS IN REACH )   Transfers   Sit to Stand 5  Supervision   Additional items Increased time required   Stand to Sit 5  Supervision   Additional items Increased time required   Functional Mobility   Functional Mobility 5  Supervision   Balance   Static Sitting Good   Static Standing Fair +   Ambulatory Fair   Activity Tolerance   Activity Tolerance Patient tolerated treatment well   Medical Staff Made Aware   PT SEEN FOR CO-SESSION WITH SKILLED PHYSICAL THERAPIST 2' CLINICALLY UNSTABLE PRESENTATION, TRAUMATIC INJURIES, NEW PRECAUTIONS/LIMITATIONS, LIMITED ACTIVITY TOLERANCE AND PRESENT IMPAIRMENTS WHICH ARE A REGRESSION FROM THE PT'S BASELINE AND IMPACTING OVERALL OCCUPATIONAL PERFORMANCE  Nurse Made Aware APPROPRIATE TO SEE    RUE Assessment   RUE Assessment WFL   LUE Assessment   LUE Assessment WFL   Hand Function   Gross Motor Coordination Functional   Fine Motor Coordination Functional   Sensation   Light Touch No apparent deficits   Vision - Complex Assessment   Additional Comments PT REPORTS MILD PHOTOPHOBIA WHICH IS HER BASELINE  NO ACUTE VISUAL CHANGES    Cognition   Overall Cognitive Status [facility]   Arousal/Participation Alert; Cooperative   Attention Within functional limits   Orientation Level Oriented X4   Memory Decreased recall of recent events   Following Commands Follows all commands and directions without difficulty   Comments PT IS PLEASANT AND COOPERATIVE  REPORTS LIMITED RECALL OF RECENT EVENTS HOWEVER APPROPRIATE T/O SESSION  NO NOTED COGNITIVE CONCERNS AT THIS TIME  NO REPORTED BY PT  Assessment   Assessment 72 YO Female SEEN FOR INITIAL OCCUPATIONAL THERAPY EVALUATION FOLLOWING [facility] S/P FALL WITH +HEADSTRIKE/LOC AND WITNESSED SEIZURE-LIKE ACTIVITY  DX INCLUDES SAH  PROBLEMS LIST INCLUDES Essential hypertension, Exertional chest pain, Hypothyroidism, and Unstable angina pectoris  PT IS FROM HOME ALONE WHERE SHE REPORTS BEING INDEPENDENT WITH ADLS/IADLS/DRIVING PTA  PT CURRENTLY REQUIRES OVERALL SUPERVISION WITH ADLS, TRANSFERS AND FUNCTIONAL MOBILITY WITHOUT USE OF AD  PT IS EXPERIENCING EXPECTED LIMITATIONS 2' PAIN, FATIGUE, LIMITED RECALL OF RECENT EVENTS, BASELINE PHOTOPHOBIA, LIMITED FAMILY/FRIEND SUPPORT  and OVERALL LIMITED ACTIVITY TOLERANCE  PT REPORTS NO ACUTE COGNITIVE CHANGES AT THIS TIME, NONE NOTED  PT EDUCATED ON DEEP BREATHING TECHNIQUES T/O ACTIVITY, SLOWING OF PACE, ENERGY CONSERVATION TECHNIQUES FOR CARRY OVER UPON D/C, INCREASED SUPPORT and CONTINUE PARTICIPATION IN SELF-CARE/MOBILITY WITH STAFF [address]  The patient's raw score on the AM-PAC Daily Activity inpatient short form is 21, standardized score is 44 27, greater than 39 4  Patients at this level are likely to benefit from discharge to home  Please refer to the recommendation of the Occupational Therapist for safe discharge planning  FROM AN OCCUPATIONAL THERAPY PERSPECTIVE, PT CAN RETURN HOME WITH INCREASED FAMILY SUPPORT WHEN MEDICALLY CLEARED  DME RECS INCLUDE CONT USE OF SC  ALL QUESTIONS/CONCERNS ADDRESSED  AT THIS TIME  NO ADDITIONAL ACUTE CARE OT NEEDS  D/C OT  Goals   Patient Goals TO RETURN HOME   TO GET HER CAR FROM THE PARK- PT EDUCATION ON DEFER TO TRAUMA TEAM FOR RETURN TO DRIVE RECS    Recommendation   OT Discharge Recommendation No rehabilitation needs   Equipment Recommended   (USE OF SC )   OT - OK to Discharge Yes   AM-PAC Daily Activity Inpatient   Lower Body Dressing 3   Bathing 3   Toileting 3   Upper Body Dressing 4   Grooming 4   Eating 4   Daily Activity Raw Score 21   Daily Activity Standardized Score (Calc for Raw Score >=11) 44 27   AM-PAC Applied Cognition Inpatient   Following a Speech/Presentation 4   Understanding Ordinary Conversation 4   Taking Medications 4   Remembering Where Things Are Placed or Put Away 4   Remembering List of 4-5 Errands 4   Taking Care of Complicated Tasks 4   Applied Cognition Raw Score 24   Applied Cognition Standardized Score 62 21   Modified Vermillion Scale   Modified Vermillion Scale 1       Documentation completed by Bakari Langston, JEAN PIERRE, OTR/L  [facility] OF THE [facility] Certified ID# [id number]

## 2022-02-07 NOTE — CASE MANAGEMENT
Case Management Assessment & Discharge Planning Note    Patient name Ziggy Funes  Location [address] 623/PPHP 287-32 MRN [id number]  : 1955 Date 2022       Current Admission Date: 2022  Current Admission Diagnosis:Fall from slipping on ice   Patient Active Problem List    Diagnosis Date Noted    Fall from slipping on ice 2022   [facility] (subarachnoid hemorrhage) ([address] ) 2022    Coronary artery disease involving native coronary artery of native heart without angina pectoris 2020    BMI 28 0-28 9,adult 2020    Essential hypertension 2020    Reflux involving intestinal tract 2020    Mixed hyperlipidemia 2020    NSTEMI, initial episode of care [facility]) 2020    Acute medial meniscus tear of right knee 2019    Internal derangement of right knee 2019    Anxiety 2016    Depressive disorder 2016    Contact dermatitis and other eczema, due to unspecified cause 2012    Hypothyroidism 2010      LOS (days): 1  Geometric Mean LOS (GMLOS) (days): 2 00  Days to GMLOS:1 3     OBJECTIVE:    Risk of Unplanned Readmission Score: 7         Current admission status: Inpatient       Preferred Pharmacy:   [address]  [address] [address]  Phone: [phone] Fax: [facility] [address], [address] [place] [address]  Phone: [phone] Fax: [phone]    Primary Care Provider: Lubna Gayle PA-C    Primary Insurance: [facility]  Secondary Insurance:     ASSESSMENT:  Active Health Care Agents    There are no active Health Care Agents on file                   Readmission Root Cause  30 Day Readmission: No    Patient Information  Admitted from[de-identified] Home  Mental Status: Alert  During Assessment patient was accompanied by: Not accompanied during assessment  Assessment information provided by[de-identified] Patient  Primary Caregiver: Self  Support Systems: [facility] of Residence: [address] [place] [address] do you live in?: [address] entry access options   Select all that apply : Stairs  Number of steps to enter home : 3  In the last 12 months, was there a time when you were not able to pay the mortgage or rent on time?: No  In the last 12 months, was there a time when you did not have a steady place to sleep or slept in a shelter (including now)?: No  Homeless/housing insecurity resource given?: N/A    Activities of Daily Living Prior to Admission  Functional Status: Independent  Completes ADLs independently?: Yes  Ambulates independently?: Yes  Does patient use assisted devices?: No  Does patient currently own DME?: No  Does patient have a history of Outpatient Therapy (PT/OT)?: No  Does the patient have a history of Short-Term Rehab?: No  Does patient have a history of HHC?: No  Does patient currently have [facility]?: No         Patient Information Continued  Does patient have prescription coverage?: Yes  Within the past 12 months, you worried that your food would run out before you got the money to buy more : Never true  Within the past 12 months, the food you bought just didnt last and you didnt have money to get more : Never true  Food insecurity resource given?: N/A  Does patient receive dialysis treatments?: No  Does patient have a history of substance abuse?: No         Means of Transportation  In the past 12 months, has lack of transportation kept you from medical appointments or from getting medications?: No  In the past 12 months, has lack of transportation kept you from meetings, work, or from getting things needed for daily living?: No  Was application for public transport provided?: N/A        DISCHARGE DETAILS:       Freedom of Choice: Yes        Were Treatment Team discharge recommendations reviewed with patient/caregiver?: Yes  Did patient/caregiver verbalize understanding of patient care needs?: Yes  Were patient/caregiver advised of the risks associated with not following Treatment Team discharge recommendations?: Yes         Requested [address]         Is the patient interested in [facility] at discharge?: No    DME Referral Provided  Referral made for DME?: No              Treatment Team Recommendation: Home  Discharge Destination Plan[de-identified] Home     Pt cleared for a home d/c as per OT/PT  CM will continue to follow for potential d/c recs moving forward  Plan to d/c home today v tomorrow  CM reviewed d/c planning process including the following: identifying help at home, patient preference for d/c planning needs, Discharge Lounge, [facility] Meds to Bed program, availability of treatment team to discuss questions or concerns patient and/or family may have regarding understanding medications and recognizing signs and symptoms once discharged  CM also encouraged patient to follow up with all recommended appointments after discharge  Patient advised of importance for patient and family to participate in managing patients medical well being

## 2022-02-07 NOTE — H&P
H&P Exam - Trauma   Richi Mattson 73 y o  female MRN: [id number]  Unit/Bed#: ED 24 Encounter: [id number]    Assessment/Plan   Trauma Alert: Evaluation  Model of Arrival: [facility]  Trauma Team: Attending Leslie Sandhoff and Residents Army Ritter  Consultants: neurosurgery    Trauma Active Problems:   [facility]  Seizure like activity    Trauma Plan:   - Neurosurgery consult  - HOT protocol  - AM CT  - Keppra for seizure prophylaxis  - Admit SD2    Chief Complaint: posterior head pain    History of Present Illness   HPI:  Richi Mattson is a 73 y o  female who presented to [facility] after a fall  Patient reports that she slipped on a patch of ice causing her to fall to the ground and strike her head  She endorses LOC and reportedly had seizure like activity witnessed by bystanders  Unknown time frame of seizure like activity  Upon arrival to [facility] she was GCS 15 with no neuro deficits  She did have one episode of emesis  On evaluation, she was found to have a left frontal SAH  She is on daily aspirin and was administered DDAVP as well as loaded with keppra prior to transport  On arrival to \[facility]\"", she only complains of pain in her posterior head where she struck the ground  She otherwise feels at baseline and is neurologically intact  Mechanism:Fall    Review of Systems   Constitutional: Negative for fever  Eyes: Negative for visual disturbance  Respiratory: Negative for shortness of breath  Cardiovascular: Negative for chest pain  Gastrointestinal: Positive for vomiting  Negative for abdominal pain and nausea  Genitourinary: Negative for flank pain  Musculoskeletal: Negative for back pain and neck pain  Skin: Negative for wound  Neurological: Positive for seizures and headaches  Negative for weakness and numbness  All other systems reviewed and are negative  12-point, complete review of systems was reviewed and negative except as stated above         Historical Information   History is unobtainable from the patient due to none  Efforts to obtain history included the following sources: obtained from other records    Past Medical History:   Diagnosis Date    Essential hypertension     Exertional chest pain     Hypothyroidism     Unstable angina pectoris ([address] ) 01/28/2020     Past Surgical History:   Procedure Laterality Date    ANGIOPLASTY      CARDIAC CATHETERIZATION  01/22/2020    EF 75% There is a small LADD1 branch that has a severe stenosis   Given the small size of the vessel the plan will be to treat medically    TONSILLECTOMY       Social History   Social History     Substance and Sexual Activity   Alcohol Use Not Currently     Social History     Substance and Sexual Activity   Drug Use Never     Social History     Tobacco Use   Smoking Status Former Smoker   Smokeless Tobacco Never Used     E-Cigarette/Vaping    E-Cigarette Use Never User      E-Cigarette/Vaping Substances    Nicotine No     THC No     CBD No     Flavoring No     Other No     Unknown No      Immunization History   Administered Date(s) Administered    COVID-19 MODERNA VACC 0 25 ML IM BOOSTER 11/29/2021    COVID-19 MODERNA VACC 0 5 ML IM 03/18/2021, 04/16/2021    Lyme Disease 08/01/2003    Pneumococcal Polysaccharide PPV23 01/29/2020    Rotavirus Tetravalent 08/01/2016    Tuberculin Skin Test-PPD Intradermal 05/19/2011    Unknown 08/01/2016     Last Tetanus: N/A  Family History: Non-contributory  Unable to obtain/limited by none      Meds/Allergies   all current active meds have been reviewed    Allergies   Allergen Reactions    Other Hives and Rash     Adhesive tape           PHYSICAL EXAM    PE limited by: none    Objective   Vitals:   First set: Temperature: (!) 96 3 °F (35 7 °C) (02/06/22 1857)  Pulse: 84 (02/06/22 1857)  Respirations: 14 (02/06/22 1857)  Blood Pressure: 146/68 (02/06/22 1857)    Primary Survey:   (A) Airway: intact  (B) Breathing: bilateral breath sounds  (C) Circulation: Pulses: pedal  2/4 and radial  2/4  (D) Disabliity:  GCS Total:  15  (E) Expose:  Completed    Secondary Survey: (Click on Physical Exam tab above)  Physical Exam  Vitals reviewed  Constitutional:       General: She is not in acute distress  Appearance: Normal appearance  She is not ill-appearing  HENT:      Head: Normocephalic  Comments: Hematoma to the posterior scalp  Mouth/Throat:      Mouth: Mucous membranes are moist       Comments: No intraoral injury  Eyes:      Extraocular Movements: Extraocular movements intact  Pupils: Pupils are equal, round, and reactive to light  Cardiovascular:      Rate and Rhythm: Normal rate  Heart sounds: No murmur heard  Pulmonary:      Effort: Pulmonary effort is normal       Breath sounds: Normal breath sounds  No wheezing, rhonchi or rales  Chest:      Chest wall: No tenderness  Abdominal:      General: There is no distension  Palpations: Abdomen is soft  Tenderness: There is no abdominal tenderness  Musculoskeletal:         General: No swelling or tenderness  Normal range of motion  Cervical back: Normal range of motion and neck supple  No tenderness  Comments: No T or L spine tenderness  Skin:     General: Skin is warm and dry  Neurological:      General: No focal deficit present  Mental Status: She is alert and oriented to person, place, and time  Comments: GCS 15  CN 2-12 intact  5/5 strength and sensation intact to all 4 extremities     Psychiatric:         Behavior: Behavior normal          Invasive Devices  Report    Peripheral Intravenous Line            Peripheral IV 02/06/22 Left Antecubital <1 day                Lab Results:   BMP/CMP:   Lab Results   Component Value Date    SODIUM 139 02/06/2022    K 4 0 02/06/2022     02/06/2022    CO2 30 02/06/2022    BUN 15 02/06/2022    CREATININE 0 83 02/06/2022    CALCIUM 9 2 02/06/2022    EGFR 74 02/06/2022    and CBC:   Lab Results   Component Value Date    WBC 11 85 (H) 02/06/2022    HGB 15 1 02/06/2022    HCT 46 4 (H) 02/06/2022    MCV 92 02/06/2022     02/06/2022    MCH 30 0 02/06/2022    MCHC 32 5 02/06/2022    RDW 14 3 02/06/2022    MPV 10 7 02/06/2022    NRBC 0 02/06/2022     Imaging/EKG Studies: Results: I have personally reviewed pertinent reports       CT head wo contrast    (Results Pending)     Other Studies: none    Code Status: Level 1 - Full Code  Advance Directive and Living Will:      Power of :    POLST:

## 2022-02-07 NOTE — PROGRESS NOTES
[facility]  Progress Note Al Xu 1955, 73 y o  female MRN: [id number]  Unit/Bed#: [facility] 623-01 Encounter: [id number]  Primary Care Provider: Chogn Tipton PA-C   Date and time admitted to hospital: 2/6/2022  6:52 PM    SAH (subarachnoid hemorrhage) ([address] )  Assessment & Plan  - S/p DDAVP 2/6  - HOT protocol - discontinued  - CTH in AM, or with change in GCS  - NSG consult - completed  - Keppra for seizure prophylaxis     Fall from slipping on ice  Assessment & Plan  - Injuries as above  - PT/OT        TERTIARY TRAUMA SURVEY NOTE    Prophylaxis: Sequential compression device (Venodyne)     Disposition: home    Code status:  Level 1 - Full Code    Consultants: Neurosurgery      SUBJECTIVE:     Transfer from: site of injury  Mechanism of Injury:Fall    Chief Complaint: headache    HPI/Last 24 hour events: Admitted HOT status    Active medications:           Current Facility-Administered Medications:     acetaminophen (TYLENOL) tablet 975 mg, 975 mg, Oral, Q8H PRN, 975 mg at 02/07/22 0724    amLODIPine (NORVASC) tablet 5 mg, 5 mg, Oral, Daily, 5 mg at 02/07/22 0930    atorvastatin (LIPITOR) tablet 80 mg, 80 mg, Oral, HS, 80 mg at 02/07/22 0036    levETIRAcetam (KEPPRA) tablet 500 mg, 500 mg, Oral, BID, 500 mg at 02/07/22 0930    levothyroxine tablet 125 mcg, 125 mcg, Oral, Early Morning, 125 mcg at 02/07/22 0545    ondansetron (ZOFRAN) injection 4 mg, 4 mg, Intravenous, Q6H PRN, 4 mg at 02/07/22 0011    pantoprazole (PROTONIX) EC tablet 40 mg, 40 mg, Oral, Daily, 40 mg at 02/07/22 0930    senna (SENOKOT) tablet 17 2 mg, 2 tablet, Oral, Daily    venlafaxine (EFFEXOR) tablet 75 mg, 75 mg, Oral, BID, 75 mg at 02/07/22 1120      OBJECTIVE:     Vitals:   Vitals:    02/07/22 1024   BP: 122/70   Pulse: 73   Resp: 20   Temp: 98 5 °F (36 9 °C)   SpO2: 93%       Physical Exam:   GENERAL APPEARANCE: comfortable, dealing with the headache  NEURO: intact, GCS - 15  HEENT: EOM's intact  CV: RRR, no complaint of chest pain  LUNGS: CTA bilaterally, no shortness of breath  GI: Tolerating a diet  : voiding  MSK: moving all four extremities  SKIN: warm and dry      1  Before the illness or injury that brought you to the Emergency, did you need someone to help you on a regular basis? 0=No   2  Since the illness or injury that brought you to the Emergency, have you needed more help than usual to take care of yourself? 1=Yes   3  Have you been hospitalized for one or more nights during the past 6 months (excluding a stay in the Emergency Department)? 0=No   4  In general, do you see well? 0=Yes   5  In general, do you have serious problems with your memory? 0=No   6  Do you take more than three different medications everyday? 1=Yes   TOTAL   2     Did you order a geriatric consult if the score was 2 or greater?: n/a                I/O:   I/O       02/05 0701 02/06 0700 02/06 0701 02/07 0700 02/07 0701 02/08 0700    P  O    240    Total Intake(mL/kg)   240 (2 6)    Net   +240           Unmeasured Urine Occurrence   1 x          Invasive Devices: Invasive Devices  Report    Peripheral Intravenous Line            Peripheral IV 02/07/22 Dorsal (posterior); Left Hand <1 day                  Imaging:   XR Trauma chest portable    Result Date: 2/7/2022  Impression: No acute cardiopulmonary disease  Workstation performed: [id number]     CT head wo contrast    Result Date: 2/7/2022  Impression: Stable small focus of subarachnoid hemorrhage within the anterior left frontal vertex  No new hemorrhage  Extracranial hematoma superficial to the occipital bone posteriorly is slightly larger than the prior exam  Workstation performed: [id number]     TRAUMA - CT head wo contrast    Result Date: 2/6/2022  Impression: 1  Small acute subarachnoid hemorrhage in the left superior frontal region  2  Posterior scalp hematoma    I personally discussed this study with Dr Sammy Rizvi on 2/6/2022 at 3:10 PM  Workstation performed: [id number]     TRAUMA - CT spine cervical wo contrast    Result Date: 2/6/2022  Impression: No cervical spine fracture or traumatic malalignment  I personally discussed this study with Dr Cristóbal Babin on 2/6/2022 at 3:10 PM   Workstation performed: [id number]     TRAUMA - CT chest abdomen pelvis w contrast    Result Date: 2/6/2022  Impression: 1  No acute traumatic findings  2  Bilateral renal cysts cysts  I personally discussed this study with Dr Cristóbal Babin on 2/6/2022 at 3:10 PM  Workstation performed: [id number]       Labs: Results for Beata Johnson (MRN [id number]) as of 2/7/2022 11:55   Ref   Range 2/7/2022 04:38   Sodium Latest Ref Range: 136 - 145 mmol/L 138   Potassium Latest Ref Range: 3 5 - 5 3 mmol/L 4 0   Chloride Latest Ref Range: 100 - 108 mmol/L 109 (H)   CO2 Latest Ref Range: 21 - 32 mmol/L 25   Anion Gap Latest Ref Range: 4 - 13 mmol/L 4   BUN Latest Ref Range: 5 - 25 mg/dL 14   Creatinine Latest Ref Range: 0 60 - 1 30 mg/dL 0 77   Glucose, Random Latest Ref Range: 65 - 140 mg/dL 101   Calcium Latest Ref Range: 8 3 - 10 1 mg/dL 9 1   eGFR Latest Units: ml/min/1 73sq m 81   WBC Latest Ref Range: 4 31 - 10 16 Thousand/uL 9 05   Red Blood Cell Count Latest Ref Range: 3 81 - 5 12 Million/uL 4 82   Hemoglobin Latest Ref Range: 11 5 - 15 4 g/dL 14 6   HCT Latest Ref Range: 34 8 - 46 1 % 44 3   MCV Latest Ref Range: 82 - 98 fL 92   MCH Latest Ref Range: 26 8 - 34 3 pg 30 3   MCHC Latest Ref Range: 31 4 - 37 4 g/dL 33 0   RDW Latest Ref Range: 11 6 - 15 1 % 14 6   Platelet Count Latest Ref Range: 149 - 390 Thousands/uL 224   MPV Latest Ref Range: 8 9 - 12 7 fL 11 0   nRBC Latest Units: /100 WBCs 0   Neutrophils % Latest Ref Range: 43 - 75 % 71   Immat GRANS % Latest Ref Range: 0 - 2 % 0   Lymphocytes Relative Latest Ref Range: 14 - 44 % 19   Monocytes Relative Latest Ref Range: 4 - 12 % 9   Eosinophils Latest Ref Range: 0 - 6 % 1   Basophils Relative Latest Ref Range: 0 - 1 % 0   Immature Grans Absolute Latest Ref Range: 0 00 - 0 20 Thousand/uL 0 03   Absolute Neutrophils Latest Ref Range: 1 85 - 7 62 Thousands/µL 6 37   Lymphocytes Absolute Latest Ref Range: 0 60 - 4 47 Thousands/µL 1 73   Absolute Monocytes Latest Ref Range: 0 17 - 1 22 Thousand/µL 0 83   Absolute Eosinophils Latest Ref Range: 0 00 - 0 61 Thousand/µL 0 05   Basophils Absolute Latest Ref Range: 0 00 - 0 10 Thousands/µL 0 04

## 2022-02-08 ENCOUNTER — TELEPHONE (OUTPATIENT)
Dept: NEUROSURGERY | Facility: CLINIC | Age: 67
End: 2022-02-08

## 2022-02-08 VITALS
DIASTOLIC BLOOD PRESSURE: 87 MMHG | OXYGEN SATURATION: 93 % | BODY MASS INDEX: 29.62 KG/M2 | SYSTOLIC BLOOD PRESSURE: 129 MMHG | TEMPERATURE: 98.3 F | WEIGHT: 200 LBS | HEIGHT: 69 IN | RESPIRATION RATE: 18 BRPM | HEART RATE: 71 BPM

## 2022-02-08 RX ORDER — LEVETIRACETAM 500 MG/1
500 TABLET ORAL 2 TIMES DAILY
Qty: 12 TABLET | Refills: 0 | Status: SHIPPED | OUTPATIENT
Start: 2022-02-08 | End: 2022-05-31

## 2022-02-08 RX ORDER — LEVETIRACETAM 500 MG/1
500 TABLET ORAL 2 TIMES DAILY
Qty: 12 TABLET | Refills: 0 | Status: SHIPPED | OUTPATIENT
Start: 2022-02-08 | End: 2022-02-08

## 2022-02-08 RX ORDER — HEPARIN SODIUM 5000 [USP'U]/ML
5000 INJECTION, SOLUTION INTRAVENOUS; SUBCUTANEOUS EVERY 8 HOURS SCHEDULED
Status: DISCONTINUED | OUTPATIENT
Start: 2022-02-08 | End: 2022-02-08 | Stop reason: HOSPADM

## 2022-02-08 RX ORDER — ACETAMINOPHEN 325 MG/1
975 TABLET ORAL EVERY 8 HOURS SCHEDULED
Qty: 30 TABLET | Refills: 0 | Status: SHIPPED | OUTPATIENT
Start: 2022-02-08 | End: 2022-02-15

## 2022-02-08 RX ADMIN — LEVOTHYROXINE SODIUM 125 MCG: 125 TABLET ORAL at 05:00

## 2022-02-08 RX ADMIN — VENLAFAXINE 75 MG: 37.5 TABLET ORAL at 08:00

## 2022-02-08 RX ADMIN — PANTOPRAZOLE SODIUM 40 MG: 40 TABLET, DELAYED RELEASE ORAL at 08:00

## 2022-02-08 RX ADMIN — HEPARIN SODIUM 5000 UNITS: 5000 INJECTION INTRAVENOUS; SUBCUTANEOUS at 08:07

## 2022-02-08 RX ADMIN — LEVETIRACETAM 500 MG: 500 TABLET, FILM COATED ORAL at 08:00

## 2022-02-08 RX ADMIN — ACETAMINOPHEN 975 MG: 325 TABLET, FILM COATED ORAL at 04:14

## 2022-02-08 RX ADMIN — AMLODIPINE BESYLATE 5 MG: 5 TABLET ORAL at 08:00

## 2022-02-08 NOTE — DISCHARGE SUMMARY
[address] [facility]  Discharge- Anuja Graft 1955, 73 y o  female MRN: [id number]  Unit/Bed#: [facility] 623-01 Encounter: [id number]  Primary Care Provider: Mary Malloy PA-C   Date and time admitted to hospital: 2/6/2022  6:52 PM    SAH (subarachnoid hemorrhage) ([facility] [address] )  Assessment & Plan  - S/p DDAVP 2/6  - HOT protocol - discontinued  - CTH in AM, or with change in GCS  - NSG consult - completed  - Keppra for seizure prophylaxis   - Repeat CTH:    Stable small focus of subarachnoid hemorrhage within the anterior left frontal vertex  No new hemorrhage    Extracranial hematoma superficial to the occipital bone posteriorly is slightly larger than the prior exam   - NSGY SO, follow up in 2 weeks with CTH, NO AC/AP for 2 weeks (Patient does take ASA- will hold)    Fall from slipping on ice  Assessment & Plan  - Injuries as above  - PT/OT  - No Ambulatory deficits          Progress Note - Tertiary Trauma [place]   Anuja Graft 73 y o  female MRN: [id number]  Unit/Bed#: [facility] 623-01 Encounter: [id number]    Summary of Diagnosed Injuries: Left frontal SAH    Clinical Plan: Admitted HOT for observation, Repeat CTH, DDAVP given for ASA reversal, Keppra for prophylaxis, SCD's      Prophylaxis: Sequential compression device (Venodyne)     Disposition: Home    Code status:  Level 1 - Full Code    Consultants: Neurosurgery      SUBJECTIVE:     Transfer from: [facility] & [address]  Mechanism of Injury:Fall slipped on ice    Chief Complaint: Head Ache    HPI/Last 24 hour events: Patient admitted, progressed well  No nausea, vomiting, dizziness  Does endorse HA  Repeat CTH showed stable SAH  NSGY following and will follow up in outpatient clinic      Active medications:           Current Facility-Administered Medications:     acetaminophen (TYLENOL) tablet 975 mg, 975 mg, Oral, Q8H MURIEL, 975 mg at 02/08/22 0414    amLODIPine (NORVASC) tablet 5 mg, 5 mg, Oral, Daily, 5 mg at 02/08/22 0800   atorvastatin (LIPITOR) tablet 80 mg, 80 mg, Oral, HS, 80 mg at 02/07/22 0036    heparin (porcine) subcutaneous injection 5,000 Units, 5,000 Units, Subcutaneous, Q8H [facility], 5,000 Units at 02/08/22 0807    levETIRAcetam (KEPPRA) tablet 500 mg, 500 mg, Oral, BID, 500 mg at 02/08/22 0800    levothyroxine tablet 125 mcg, 125 mcg, Oral, Early Morning, 125 mcg at 02/08/22 0500    ondansetron (ZOFRAN) injection 4 mg, 4 mg, Intravenous, Q6H PRN, 4 mg at 02/07/22 1539    pantoprazole (PROTONIX) EC tablet 40 mg, 40 mg, Oral, Daily, 40 mg at 02/08/22 0800    senna (SENOKOT) tablet 17 2 mg, 2 tablet, Oral, Daily    venlafaxine (EFFEXOR) tablet 75 mg, 75 mg, Oral, BID, 75 mg at 02/08/22 0800      OBJECTIVE:     Vitals:   Vitals:    02/08/22 1011   BP: 129/87   Pulse: 71   Resp: 18   Temp: 98 3 °F (36 8 °C)   SpO2: 93%       Physical Exam:   GENERAL APPEARANCE: NAD appears comfortable in bed  NEURO: Intact, Alert and oriented x 3  HEENT: PERRL  CV: RRR  LUNGS: CTA B/L throughout  GI: Abdomen Soft, NT, ND +BS x 4, endorses BM  : Intact voiding  MSK: RAYMOND's BLE/BUE 5/5 strength  SKIN: Intact      1  Before the illness or injury that brought you to the Emergency, did you need someone to help you on a regular basis? 0=No   2  Since the illness or injury that brought you to the Emergency, have you needed more help than usual to take care of yourself? 0=No   3  Have you been hospitalized for one or more nights during the past 6 months (excluding a stay in the Emergency Department)? 0=No   4  In general, do you see well? 0=Yes   5  In general, do you have serious problems with your memory? 0=No   6  Do you take more than three different medications everyday? 1=Yes   TOTAL   1     Did you order a geriatric consult if the score was 2 or greater?: no            I/O:   I/O       02/06 0701 02/07 0700 02/07 0701 02/08 0700 02/08 0701 02/09 0700    P  O   415 240    Total Intake(mL/kg)  415 (4 6) 240 (2 6)    Net  +415 +240 Unmeasured Urine Occurrence  2 x     Unmeasured Stool Occurrence  0 x           Invasive Devices: Invasive Devices  Report    Peripheral Intravenous Line            Peripheral IV 02/07/22 Dorsal (posterior); Left Hand 1 day                  Imaging:   XR Trauma chest portable    Result Date: 2/7/2022  Impression: No acute cardiopulmonary disease  Workstation performed: [id number]     CT head wo contrast    Result Date: 2/7/2022  Impression: Stable small focus of subarachnoid hemorrhage within the anterior left frontal vertex  No new hemorrhage  Extracranial hematoma superficial to the occipital bone posteriorly is slightly larger than the prior exam  Workstation performed: [id number]     TRAUMA - CT head wo contrast    Result Date: 2/6/2022  Impression: 1  Small acute subarachnoid hemorrhage in the left superior frontal region  2  Posterior scalp hematoma  I personally discussed this study with Dr Daryl Nuñez on 2/6/2022 at 3:10 PM  Workstation performed: [id number]     TRAUMA - CT spine cervical wo contrast    Result Date: 2/6/2022  Impression: No cervical spine fracture or traumatic malalignment  I personally discussed this study with Dr Daryl Nuñez on 2/6/2022 at 3:10 PM   Workstation performed: [id number]     TRAUMA - CT chest abdomen pelvis w contrast    Result Date: 2/6/2022  Impression: 1  No acute traumatic findings  2  Bilateral renal cysts cysts  I personally discussed this study with Dr Daryl Nuñez on 2/6/2022 at 3:10 PM  Workstation performed: [id number]       Labs: I have reviewed all the lab results  Medical Problems             Resolved Problems  Date Reviewed: 2/7/2022    None                Admission Date:   Admission Orders (From admission, onward)     Ordered        02/06/22 1922  Inpatient Admission  Once                        Admitting Diagnosis: Subarachnoid hemorrhage ([address] ) [I60 9]  Unspecified multiple injuries, initial encounter [T07  XXXA]    HPI: Patient is a 73year old female who sustained a mechanical fall, slipped on a patch of ice falling to the ground and had endorsed head strike  Patient is on ASA daily due to past medical history of MI  She reportedly did have loss of consciousness, as well as seizure-like activity  She was found to have a left frontal subarachnoid hemorrhage on head CT and a posterior scalp laceration  Procedures Performed: No orders of the defined types were placed in this encounter  Summary of Hospital Course: The patient was admitted to the trauma service  She was admitted HOT protocol, neurosurgery was consulted  She was loaded with Keppra and will remain on Keppra for 7 days  Her aspirin was reversed with DDAVP a repeat head CT showed a stable subarachnoid hemorrhage  She will follow-up with Neurosurgery on an outpatient basis and received an additional CT head at that time  Until then she will remain off her aspirin  During her hospitalization she complained of headache, no nausea or vomiting, she was tolerating a regular diet, ambulating right ead sj  Her vital signs remained stable  She will be discharged home and follow up in outpatient trauma clinic as well for concussion management  Significant Findings, Care, Treatment and Services Provided:  As above    Complications:  None    Condition at Discharge: good         Discharge instructions/Information to patient and family:   See after visit summary for information provided to patient and family  Provisions for Follow-Up Care:  See after visit summary for information related to follow-up care and any pertinent home health orders  PCP: Sean Lobo PA-C    Disposition: Home    Planned Readmission: No    Discharge Statement   I spent 20 minutes discharging the patient  This time was spent on the day of discharge  I had direct contact with the patient on the day of discharge   Additional documentation is required if more than 30 minutes were spent on discharge  Discharge Medications:  See after visit summary for reconciled discharge medications provided to patient and family

## 2022-02-08 NOTE — UTILIZATION REVIEW
Initial Clinical Review    Unable to submit via [address] [place] [address] for this [employer]  Per Navinet; No records found matching the search options  Admission: Date/Time/Statement:   Admission Orders (From admission, onward)     Ordered        02/06/22 1922  Inpatient Admission  Once                      Orders Placed This Encounter   Procedures    Inpatient Admission     Standing Status:   Standing     Number of Occurrences:   1     Order Specific Question:   Level of Care     Answer:   Level 2 Stepdown / HOT [14]     Order Specific Question:   Bed Type     Answer:   Trauma [7]     Order Specific Question:   Estimated length of stay     Answer:   More than 2 Midnights     Order Specific Question:   Certification     Answer:   I certify that inpatient services are medically necessary for this patient for a duration of greater than two midnights  See H&P and MD Progress Notes for additional information about the patient's course of treatment  ED Arrival Information     Expected Arrival Acuity    2/6/2022 2/6/2022 18:52 Emergent         Means of arrival Escorted by Service Admission type    Ambulance [facility] Trauma Emergency         Arrival complaint    fall, sah        Chief Complaint   Patient presents with    Trauma     txr from [address] [place] [address] for witnessed fall with head strike resulting in seizure  had desmopressin, keppra, hydralazine     Initial Presentation:   71y Female, transfer from [facility] ED to [facility] ED presents for Fall with posterior head pain  Per pt, she slipped on a patch of ice causing her to fall to the ground and strike her head  + LOC, had seizure like activity witnessed by bystanders  Unknown time frame of seizure like activity  GCS 15  Emesis x1 episode  Pt found with Left frontal SAH  On daily aspirin and given DDAVP as well as loaded with keppra prior to transport  On arrival to [facility], she only complains of pain in her posterior head where she struck the ground     [facility] level of care for Fall with [facility] and Seizure like activity  High Observation Trauma  Neurosurgery consult  CT Head in am 2/7  Keppra for seizure ppx  Date: 2/7   Day 2:   Neurosurgery cons; Left superior frontal SAH  S/p slip and fall on ice with positive head strike and LOC on aspirin  S/p DDAVP  Questionable seizure-like activity after fall witnessed by friends  Continue frequent neuro checks  Recommend SBP ,160 mmgHg  Hold all antiplatelet and anticoagulation meds  Seizure ppx on Keppra 500 mg bid  No neurosurgical intervention indicated at this juncture  CT head wo 2/6/22:Small acute subarachnoid hemorrhage in the left superior frontal region  Posterior scalp hematoma  CT head wo 2/7/22:Stable small focus of subarachnoid hemorrhage within the anterior left frontal vertex   No new hemorrhage  Extracranial hematoma superficial to the occipital bone posteriorly is slightly larger than the prior exam     Progress notes; CT head today or with change in GCS  Continue Keppra for seizure ppx       ED Triage Vitals [02/06/22 1857]   Temperature Pulse Respirations Blood Pressure SpO2   (!) 96 3 °F (35 7 °C) 84 14 146/68 95 %      Temp Source Heart Rate Source Patient Position - Orthostatic VS BP Location FiO2 (%)   Tympanic Monitor Lying Right arm --      Pain Score       2          Wt Readings from Last 1 Encounters:   02/07/22 90 7 kg (200 lb)     Additional Vital Signs:   02/07/22 08:35:06 98 1 °F (36 7 °C) 77 16 124/69 87 95 % -- --   02/07/22 0700 -- 82 18 140/76 -- 98 % None (Room air) Lying   02/07/22 0330 -- 89 18 114/83 -- 98 % None (Room air) Lying   02/07/22 0130 -- 78 20 137/78 -- 93 % None (Room air)      02/06/22 2330 -- 84 18 141/81 105 92 % None (Room air) Lying   02/06/22 2200 -- 88 20 144/83 -- 94 % None (Room air) --   02/06/22 2130 -- 78 18 152/79 109 96 % None (Room air) Lying   02/06/22 2100 -- -- -- 148/76 -- -- -- --   02/06/22 19:15:53 98 3 °F (36 8 °C) -- -- -- -- -- --      Pertinent Labs/Diagnostic Test Results:   2/6  PCXR - No acute cardiopulmonary disease  CT Head -   Small acute subarachnoid hemorrhage in the left superior frontal region  Posterior scalp hematoma  CT Cervical Spine - No cervical spine fracture or traumatic malalignment  CT chest/abd/pelvis - 1  No acute traumatic findings  2  Bilateral renal cysts cysts  2/7  Repeat CT Head - Stable small focus of subarachnoid hemorrhage within the anterior left frontal vertex  No new hemorrhage    Extracranial hematoma superficial to the occipital bone posteriorly is slightly larger than the prior exam         Results from last 7 days   Lab Units 02/07/22  0438 02/06/22  1457   WBC Thousand/uL 9 05 11 85*   HEMOGLOBIN g/dL 14 6 15 1   HEMATOCRIT % 44 3 46 4*   PLATELETS Thousands/uL 224 213   NEUTROS ABS Thousands/µL 6 37 9 69*         Results from last 7 days   Lab Units 02/07/22  0438 02/06/22  1457   SODIUM mmol/L 138 139   POTASSIUM mmol/L 4 0 4 0   CHLORIDE mmol/L 109* 103   CO2 mmol/L 25 30   ANION GAP mmol/L 4 6   BUN mg/dL 14 15   CREATININE mg/dL 0 77 0 83   EGFR ml/min/1 73sq m 81 74   CALCIUM mg/dL 9 1 9 2             Results from last 7 days   Lab Units 02/07/22  0438 02/06/22  1457   GLUCOSE RANDOM mg/dL 101 102       ED Treatment:   Medication Administration from 02/06/2022 1518 to 02/07/2022 0813       Date/Time Order Dose Route Action     02/07/2022 0011 ondansetron (ZOFRAN) injection 4 mg 4 mg Intravenous Given     02/06/2022 2117 levETIRAcetam (KEPPRA) tablet 500 mg 500 mg Oral Given     02/07/2022 0724 acetaminophen (TYLENOL) tablet 975 mg 975 mg Oral Given     02/07/2022 0036 atorvastatin (LIPITOR) tablet 80 mg 80 mg Oral Given     02/07/2022 0545 levothyroxine tablet 125 mcg 125 mcg Oral Given     02/06/2022 2117 venlafaxine (EFFEXOR) tablet 75 mg 75 mg Oral Given        Past Medical History:   Diagnosis Date    Essential hypertension     Exertional chest pain     Hypothyroidism     Unstable angina pectoris ([facility] [address] ) 01/28/2020     Present on Admission:  **None**      Admitting Diagnosis: Subarachnoid hemorrhage (HCC) [I60 9]  Unspecified multiple injuries, initial encounter [T07  XXXA]  Age/Sex: 73 y o  female     Admission Orders:  Scheduled Medications:  acetaminophen, 975 mg, Oral, Q8H [address]  amLODIPine, 5 mg, Oral, Daily  atorvastatin, 80 mg, Oral, HS  levETIRAcetam, 500 mg, Oral, BID  levothyroxine, 125 mcg, Oral, Early Morning  pantoprazole, 40 mg, Oral, Daily  senna, 2 tablet, Oral, Daily  venlafaxine, 75 mg, Oral, BID      Continuous IV Infusions: None     PRN Meds:  ondansetron, 4 mg, Intravenous, Q6H PRN 2/7 x1      Neuro checks q1h  IP CONSULT TO NEUROSURGERY    Network Utilization Review Department  ATTENTION: Please call with any questions or concerns to [phone] and carefully listen to the prompts so that you are directed to the right person  All voicemails are confidential   Ty Limb all requests for admission clinical reviews, approved or denied determinations and any other requests to dedicated fax number below belonging to the campus where the patient is receiving treatment   List of dedicated fax numbers for the Facilities:  [address] DENIALS (Administrative/Medical Necessity) [phone]   [address] (Maternity/NICU/Pediatrics) [phone]   [address] [place] [address]  [phone]   [address] [address] [address] [place] [address] [address] [place] [phone]

## 2022-02-08 NOTE — ASSESSMENT & PLAN NOTE
- S/p DDAVP 2/6  - HOT protocol - discontinued  - CTH in AM, or with change in GCS  - NSG consult - completed  - Keppra for seizure prophylaxis   - Repeat CTH:    Stable small focus of subarachnoid hemorrhage within the anterior left frontal vertex    No new hemorrhage    Extracranial hematoma superficial to the occipital bone posteriorly is slightly larger than the prior exam   - NSGY SO, follow up in 2 weeks with CTH, NO AC/AP for 2 weeks (Patient does take ASA- will hold)

## 2022-02-08 NOTE — TELEPHONE ENCOUNTER
2/9/22- PT DISCHARGED HOME  CONFIRMED 2/22/22 VIRTUAL F/U AND CT HEAD 2-3DAYS PRIOR   PT AWARE    2/8/22- PT [facility] F/U SCHEDULED 2/22/22  PT WILL NEED CT HEAD    2/7/22- (SHERRIE) S/O, PT WILL F/U IN 2WKS W/ REPEAT CT HEAD

## 2022-02-08 NOTE — CASE MANAGEMENT
Case Management Discharge Planning Note    Patient name Anuja Graft  Location [address]/PPHP 603-62 MRN [id number]  : 1955 Date 2022       Current Admission Date: 2022  Current Admission Diagnosis:SAH (subarachnoid hemorrhage) [facility])   Patient Active Problem List    Diagnosis Date Noted    Fall from slipping on ice 2022   [facility] (subarachnoid hemorrhage) ([facility] [address] ) 2022    Coronary artery disease involving native coronary artery of native heart without angina pectoris 2020    BMI 28 0-28 9,adult 2020    Essential hypertension 2020    Reflux involving intestinal tract 2020    Mixed hyperlipidemia 2020    NSTEMI, initial episode of care [facility]) 2020    Acute medial meniscus tear of right knee 2019    Internal derangement of right knee 2019    Anxiety 2016    Depressive disorder 2016    Contact dermatitis and other eczema, due to unspecified cause 2012    Hypothyroidism 2010      LOS (days): 2  Geometric Mean LOS (GMLOS) (days): 2 00  Days to GMLOS:0 3     OBJECTIVE:  Risk of Unplanned Readmission Score: 8         Current admission status: Inpatient   [facility]   [address] [facility] [place] [facility] [address]  [facility] [address] [address]  Phone: [phone] Fax: [address], [address] [place] [address]  Phone: [phone] Fax: [phone]    Primary Care Provider: Mary Malloy PA-C    Primary Insurance: [facility]  Secondary Insurance:     DISCHARGE DETAILS:    Discharge planning discussed with[de-identified] pt                                     Other Referral/Resources/Interventions Provided:  Financial Resources Provided: Indigent Transportation         Treatment Team Recommendation: Home  Discharge Destination Plan[de-identified] Home  Transport at Discharge : Free Local Transportation  Dispatcher Contacted: Yes  Number/Name of Dispatcher: Jason  Transported by [employer] and Unit #): LYFT  ETA of Transport (Date): 02/08/22  ETA of Transport (Time): 1300        Accompanied by: Alone     IMM Given (Date):: 02/08/22     Pt will d/c today  Pt unable to secure transportation home via family or friends  CM obtained a Lyft for the pt  Lyft will arrive @ 1300  Pt will be picked up at the A entrance   Lyft will call  upon arrival

## 2022-02-09 ENCOUNTER — TRANSITIONAL CARE MANAGEMENT (OUTPATIENT)
Dept: FAMILY MEDICINE CLINIC | Facility: CLINIC | Age: 67
End: 2022-02-09

## 2022-02-09 NOTE — UTILIZATION REVIEW
Notification of Discharge   This is a Notification of Discharge from our facility [address]  Please be advised that this patient has been discharge from our facility  Below you will find the admission and discharge date and time including the patients disposition  UTILIZATION REVIEW CONTACT:  Antonio Ortiz  Utilization   Network Utilization Review Department  Phone: [phone] x carefully listen to the prompts  All voicemails are confidential   Email: [email or web address]     PHYSICIAN ADVISORY SERVICES:  FOR MZDR-TV-TMAC REVIEW - MEDICAL NECESSITY DENIAL  Phone: [phone]  Fax: [phone]  Email: [email or web address]     PRESENTATION DATE: 2/6/2022  6:52 PM  OBERVATION ADMISSION DATE:   INPATIENT ADMISSION DATE: 2/6/22  7:20 PM   DISCHARGE DATE: 2/8/2022  1:10 PM  DISPOSITION: Home/Self Care Home/Self Care      IMPORTANT INFORMATION:  Send all requests for admission clinical reviews, approved or denied determinations and any other requests to dedicated fax number below belonging to the campus where the patient is receiving treatment   List of dedicated fax numbers:  [address] DENIALS (Administrative/Medical Necessity) [phone]   [address] (Maternity/NICU/Pediatrics) [phone]   [facility] [phone]   130 [facility] [phone]   [address] [place] [address] [phone]   2000 [facility] [address] [place] [address],[address] [phone]   [facility]  [phone]   2205 [facility]  [address] [phone]

## 2022-02-11 ENCOUNTER — OFFICE VISIT (OUTPATIENT)
Dept: FAMILY MEDICINE CLINIC | Facility: CLINIC | Age: 67
End: 2022-02-11
Payer: COMMERCIAL

## 2022-02-11 VITALS
HEART RATE: 69 BPM | BODY MASS INDEX: 29.89 KG/M2 | TEMPERATURE: 96.1 F | DIASTOLIC BLOOD PRESSURE: 88 MMHG | HEIGHT: 68 IN | OXYGEN SATURATION: 98 % | WEIGHT: 197.2 LBS | SYSTOLIC BLOOD PRESSURE: 122 MMHG

## 2022-02-11 DIAGNOSIS — I10 ESSENTIAL HYPERTENSION: ICD-10-CM

## 2022-02-11 DIAGNOSIS — Z09 HOSPITAL DISCHARGE FOLLOW-UP: Primary | ICD-10-CM
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
## 2022-02-11 DIAGNOSIS — S16.1XXD STRAIN OF NECK MUSCLE, SUBSEQUENT ENCOUNTER: ICD-10-CM

## 2022-02-11 DIAGNOSIS — I60.9 SAH (SUBARACHNOID HEMORRHAGE) (HCC): ICD-10-CM

## 2022-02-11 DIAGNOSIS — E78.2 MIXED HYPERLIPIDEMIA: ICD-10-CM

## 2022-02-11 DIAGNOSIS — E03.9 HYPOTHYROIDISM, UNSPECIFIED TYPE: ICD-10-CM

## 2022-02-11 PROCEDURE — 3008F BODY MASS INDEX DOCD: CPT | Performed by: NURSE PRACTITIONER

## 2022-02-11 PROCEDURE — 99496 TRANSJ CARE MGMT HIGH F2F 7D: CPT | Performed by: PHYSICIAN ASSISTANT

## 2022-02-11 PROCEDURE — 1111F DSCHRG MED/CURRENT MED MERGE: CPT | Performed by: PHYSICIAN ASSISTANT

## 2022-02-11 RX ORDER — ATORVASTATIN CALCIUM 80 MG/1
80 TABLET, FILM COATED ORAL
Qty: 90 TABLET | Refills: 1 | Status: SHIPPED | OUTPATIENT
Start: 2022-02-11 | End: 2022-03-02

## 2022-02-11 RX ORDER — LEVOTHYROXINE SODIUM 0.12 MG/1
125 TABLET ORAL DAILY
Qty: 90 TABLET | Refills: 1 | Status: SHIPPED | OUTPATIENT
Start: 2022-02-11

## 2022-02-11 RX ORDER — AMLODIPINE BESYLATE 5 MG/1
5 TABLET ORAL DAILY
Qty: 90 TABLET | Refills: 1 | Status: SHIPPED | OUTPATIENT
Start: 2022-02-11 | End: 2022-08-01

## 2022-02-11 NOTE — PROGRESS NOTES
Transition of Care  Follow-up After Hospitalization    Keshawn Barrios 73 y o  female   Date:  2/11/2022    TCM Call (since 1/11/2022)     Date and time call was made  2/9/2022  2:44 PM    Hospital care reviewed  Records reviewed        Patient was hospitialized at  [facility]        Date of Admission  02/06/22    Date of discharge  02/08/22    Diagnosis  subarachnoid hemorrhage    Disposition  Home    Were the patients medications reviewed and updated  No    Current Symptoms  Headache    Headache pain severity  Mild    Headache pain onset  Ongoing    Headache pain level  1    Headache cause / trigger  Trauma      TCM Call (since 1/11/2022)     Should patient be enrolled in anticoag monitoring? No    Scheduled for follow up? Yes    Did you obtain your prescribed medications  Yes    Do you need help managing your prescriptions or medications  No    Is transportation to your appointment needed  No    I have advised the patient to call PCP with any new or worsening symptoms  Lucio Valladares, [address] [place] [address]  Family    The type of support provided  Emotional; Physical    Do you have social support  Yes, as much as I need    Are you recieving any outpatient services  No    Are you recieving home care services  No    Are you using any community resources  No    Current waiver services  No    Have you fallen in the last 12 months  Yes    Interperter language line needed  No    Counseling  Patient        Hospital records were reviewed  Medications upon discharge reviewed/updated  Discharge Disposition:  Home  Follow up visits with other specialists: Neurosurgery      Assessment and Plan:    Yuly Capone was seen today for transition of care management      Diagnoses and all orders for this visit:    Hospital discharge follow-up    Fall due to slipping on ice or snow, subsequent encounter    [facility]  Comments:  has upcoming repeat CT and follow up with neurosurgery within next 2 weeks, on seizure prophylaxis; avoid NSAIDs and off aspirin    Essential hypertension  Comments:  stable no change  Orders:  -     amLODIPine (NORVASC) 5 mg tablet; Take 1 tablet (5 mg total) by mouth daily    Mixed hyperlipidemia  -     atorvastatin (LIPITOR) 80 mg tablet; Take 1 tablet (80 mg total) by mouth daily at bedtime  -     Lipid panel; Future    Hypothyroidism, unspecified type  -     levothyroxine (Euthyrox) 125 mcg tablet; Take 1 tablet (125 mcg total) by mouth daily  -     TSH, 3rd generation; Future  -     T4, free; Future    Strain of neck muscle, subsequent encounter  Comments:  heat x 20 mins followed by ice 20 mins, gentle stretching   Orders:  -     Ambulatory Referral to Physical Therapy; Future    Other orders  -     Cancel: Mammo screening bilateral w 3d & cad; Future            HPI:  HPI   Patient is a 74 yo who presents for hospitalization follow up  She suffered a fall in ice on 2/6 with head trauma resulting if a witnessed seizure and SAH  She was admitted under trauma service at [facility] 2/6-2/8  She was evaluated by neurosurgery and has follow up outpatient in 2 weeks with repeat CThead  She was cleared by PT/OT  Her cbc and bmp was stable  Her aspirin was held and she was discharged with 1 week of keppra  She is not driving  She is avoiding asa and NSAIDs  She is taking tylenol for headache and cervical strain  She is sleeping a lot and feeling drained  She is resting and hydrating  She has no questions today  ROS: Review of Systems   Constitutional: Positive for activity change and fatigue  Negative for chills, diaphoresis and fever  Respiratory: Negative  Cardiovascular: Negative  Musculoskeletal: Positive for myalgias and neck pain  Neurological: Positive for headaches  Negative for dizziness, seizures (no further seizure like activity) and syncope         Past Medical History:   Diagnosis Date    Essential hypertension     Exertional chest pain  Hypothyroidism     Unstable angina pectoris ([facility] [address] ) 01/28/2020       Past Surgical History:   Procedure Laterality Date    ANGIOPLASTY      CARDIAC CATHETERIZATION  01/22/2020    EF 75% There is a small LADD1 branch that has a severe stenosis  Given the small size of the vessel the plan will be to treat medically    TONSILLECTOMY         Social History     Socioeconomic History    Marital status: Single     Spouse name: None    Number of children: None    Years of education: None    Highest education level: None   Occupational History    None   Tobacco Use    Smoking status: Former Smoker    Smokeless tobacco: Never Used   Vaping Use    Vaping Use: Never used   Substance and Sexual Activity    Alcohol use: Not Currently    Drug use: Never    Sexual activity: Not Currently   Other Topics Concern    None   Social History Narrative    None     Social Determinants of Health     Financial Resource Strain: Not on file   Food Insecurity: No Food Insecurity    Worried About Running Out of Food in the Last Year: Never true    Ora of Food in the Last Year: Never true   Transportation Needs: No Transportation Needs    Lack of Transportation (Medical): No    Lack of Transportation (Non-Medical):  No   Physical Activity: Not on file   Stress: Not on file   Social Connections: Not on file   Intimate Partner Violence: Not on file   Housing Stability: Unknown    Unable to Pay for Housing in the Last Year: No    Number of Places Lived in the Last Year: Not on file    Unstable Housing in the Last Year: No       Family History   Problem Relation Age of Onset    Thyroid disease Mother     Heart attack Father     Heart attack Brother     Thyroid disease Maternal Grandmother     Cancer Maternal Grandmother     Diabetes Maternal Grandfather     Heart attack Maternal Grandfather     Heart attack Paternal Grandfather     Heart attack Maternal Uncle        Allergies   Allergen Reactions    Other Hives and Rash     Adhesive tape           Current Outpatient Medications:     acetaminophen (TYLENOL) 325 mg tablet, Take 3 tablets (975 mg total) by mouth every 8 (eight) hours for 7 days, Disp: 30 tablet, Rfl: 0    amLODIPine (NORVASC) 5 mg tablet, Take 1 tablet (5 mg total) by mouth daily, Disp: 90 tablet, Rfl: 1    atorvastatin (LIPITOR) 80 mg tablet, Take 1 tablet (80 mg total) by mouth daily at bedtime, Disp: 90 tablet, Rfl: 1    ergocalciferol (VITAMIN D2) 50,000 units, Take 1 capsule by mouth once a week, Disp: 12 capsule, Rfl: 0    levETIRAcetam (KEPPRA) 500 mg tablet, Take 1 tablet (500 mg total) by mouth 2 (two) times a day for 6 days, Disp: 12 tablet, Rfl: 0    levothyroxine (Euthyrox) 125 mcg tablet, Take 1 tablet (125 mcg total) by mouth daily, Disp: 90 tablet, Rfl: 1    venlafaxine (EFFEXOR) 75 mg tablet, Take 1 tablet (75 mg total) by mouth 2 (two) times a day, Disp: 180 tablet, Rfl: 1    pantoprazole (PROTONIX) 40 mg tablet, Take 1 tablet (40 mg total) by mouth daily, Disp: 30 tablet, Rfl: 3      Physical Exam:  /88 (BP Location: Left arm, Patient Position: Sitting, Cuff Size: Standard)   Pulse 69   Temp (!) 96 1 °F (35 6 °C) (Tympanic)   Ht 5' 8" (1 727 m)   Wt 89 4 kg (197 lb 3 2 oz)   SpO2 98%   BMI 29 98 kg/m²     Physical Exam  Constitutional:       General: She is not in acute distress  Appearance: Normal appearance  She is not ill-appearing or toxic-appearing  HENT:      Head: Normocephalic and atraumatic  Right Ear: External ear normal       Left Ear: External ear normal    Eyes:      Conjunctiva/sclera: Conjunctivae normal       Pupils: Pupils are equal, round, and reactive to light  Cardiovascular:      Rate and Rhythm: Normal rate and regular rhythm  Heart sounds: No murmur heard  Pulmonary:      Effort: Pulmonary effort is normal  No respiratory distress  Breath sounds: Normal breath sounds  No wheezing     Musculoskeletal:         General: No deformity  Cervical back: Normal range of motion and neck supple  Skin:     General: Skin is warm and dry  Neurological:      General: No focal deficit present  Mental Status: She is alert and oriented to person, place, and time     Psychiatric:         Mood and Affect: Mood normal          Behavior: Behavior normal              Labs:  Lab Results   Component Value Date    WBC 9 05 02/07/2022    HGB 14 6 02/07/2022    HCT 44 3 02/07/2022    MCV 92 02/07/2022     02/07/2022     Lab Results   Component Value Date    K 4 0 02/07/2022     (H) 02/07/2022    CO2 25 02/07/2022    BUN 14 02/07/2022    CREATININE 0 77 02/07/2022    GLUF 116 (H) 05/18/2021    CALCIUM 9 1 02/07/2022    AST 18 05/18/2021    ALT 30 05/18/2021    ALKPHOS 80 05/18/2021    EGFR 81 02/07/2022

## 2022-02-15 ENCOUNTER — HOSPITAL ENCOUNTER (OUTPATIENT)
Dept: CT IMAGING | Facility: HOSPITAL | Age: 67
Discharge: HOME | End: 2022-02-15
Payer: COMMERCIAL

## 2022-02-15 DIAGNOSIS — I60.9 SUBARACHNOID HEMORRHAGE (HCC): ICD-10-CM

## 2022-02-15 PROCEDURE — 70450 CT HEAD/BRAIN W/O DYE: CPT

## 2022-02-15 PROCEDURE — G1004 CDSM NDSC: HCPCS

## 2022-02-16 DIAGNOSIS — F41.9 ANXIETY: ICD-10-CM

## 2022-02-16 DIAGNOSIS — F32.A DEPRESSIVE DISORDER: ICD-10-CM

## 2022-02-16 DIAGNOSIS — E55.9 VITAMIN D DEFICIENCY: ICD-10-CM

## 2022-02-16 RX ORDER — VENLAFAXINE 75 MG/1
TABLET ORAL
Qty: 180 TABLET | Refills: 0 | Status: SHIPPED | OUTPATIENT
Start: 2022-02-16 | End: 2022-04-06 | Stop reason: SDUPTHER

## 2022-02-16 RX ORDER — ERGOCALCIFEROL 1.25 MG/1
CAPSULE ORAL
Qty: 12 CAPSULE | Refills: 0 | Status: SHIPPED | OUTPATIENT
Start: 2022-02-16 | End: 2022-05-31

## 2022-02-16 NOTE — TELEPHONE ENCOUNTER
Requested medication(s) are due for refill today: Yes  Patient has already received a courtesy refill: No  Other reason request has been forwarded to provider: Venlafaxine approved   Vitamin D failed protocol

## 2022-02-22 ENCOUNTER — TELEPHONE (OUTPATIENT)
Dept: NEUROSURGERY | Facility: CLINIC | Age: 67
End: 2022-02-22

## 2022-02-22 ENCOUNTER — TELEMEDICINE (OUTPATIENT)
Dept: NEUROSURGERY | Facility: CLINIC | Age: 67
End: 2022-02-22
Payer: COMMERCIAL

## 2022-02-22 DIAGNOSIS — I60.9 SAH (SUBARACHNOID HEMORRHAGE) (HCC): Primary | ICD-10-CM

## 2022-02-22 DIAGNOSIS — M54.2 NECK PAIN, ACUTE: ICD-10-CM

## 2022-02-22 PROCEDURE — 1160F RVW MEDS BY RX/DR IN RCRD: CPT | Performed by: NURSE PRACTITIONER

## 2022-02-22 PROCEDURE — 1036F TOBACCO NON-USER: CPT | Performed by: NURSE PRACTITIONER

## 2022-02-22 PROCEDURE — 99203 OFFICE O/P NEW LOW 30 MIN: CPT | Performed by: NURSE PRACTITIONER

## 2022-02-22 NOTE — ASSESSMENT & PLAN NOTE
· Reports neck is very sore with distribution down into shoulders and down right arm  · Reports her PCP ordered therapy to start , referral review,  indicates neck muscle strain--patient reports she anna start now that her bleed is resolved  CT Cervical  2/6/2022 No cervical spine fracture or traumatic malalignment  No subluxation  Moderate multilevel cervical degenerative changes are noted  No critical central canal stenosis            PLAN  · Patient reports she is starting physical therapy ordered by PCP

## 2022-02-22 NOTE — ASSESSMENT & PLAN NOTE
· As addressed in HPI  · Denies headache nausea, vomiting, change in cognition , weakness in extremities  · "lumb on back of head" has resolved  · Request when she can resume ASA, prescribed by cardiologist  · Completed Keppra  · Denies pain or discomfort  · Non-focal examination  via tele medicine   · She questions if was tested form COVID 19 during hospital stay , informed I do not see a lab but do see her immunization record  She expresses concern that as and inpatient she was not tested, this poses and potential exposure risk when patients are not tested on entry into the hospital  Informed patient I will notify practice administrator of her concern  I informed patient Davon Flaherty does follow CDA guidelines/recommendations       Imagining   · CT brain w/o contrast 2/15/2022 No intracranial mass, mass effect or midline shift  No CT signs of acute infarction  No acute parenchymal hemorrhage              Plan  · Reviewed imagining   ·  AP /ASA from a neurosurgery standpoint/recommendation can resume but is to check with medical/cardiac  provider or whom every is prescribing to determine if indicated     · If you develop symptom is a sudden, severe headache  The headache is sometimes associated with nausea, vomiting and a brief loss of consciousness, call 911 , reports immediately to emergency room    · If you have additional concerns or questions call neurosurgery    ·

## 2022-02-22 NOTE — PROGRESS NOTES
Virtual Regular Visit    Verification of patient location:    Patient is located in the following state in which I hold an active license [place]      Assessment/Plan:    Problem List Items Addressed This Visit        Nervous and Auditory    SAH (subarachnoid hemorrhage) ([facility] [address] ) - Primary     · As addressed in HPI  · Denies headache nausea, vomiting, change in cognition , weakness in extremities  · "lumb on back of head" has resolved  · Request when she can resume ASA, prescribed by cardiologist  · Completed Keppra  · Denies pain or discomfort  · Non-focal examination  via tele medicine   · She questions if was tested form COVID 19 during hospital stay , informed I do not see a lab but do see her immunization record  She expresses concern that as and inpatient she was not tested, this poses and potential exposure risk when patients are not tested on entry into the hospital  Informed patient I will notify practice administrator of her concern  I informed patient Torsten Valenzuela does follow CDA guidelines/recommendations       Imagining   · CT brain w/o contrast 2/15/2022 No intracranial mass, mass effect or midline shift  No CT signs of acute infarction  No acute parenchymal hemorrhage              Plan  · Reviewed imagining   ·  AP /ASA from a neurosurgery standpoint/recommendation can resume but is to check with medical/cardiac  provider or whom every is prescribing to determine if indicated     · If you develop symptom is a sudden, severe headache  The headache is sometimes associated with nausea, vomiting and a brief loss of consciousness, call 911 , reports immediately to emergency room  · If you have additional concerns or questions call neurosurgery    ·            Relevant Orders    CT head without contrast       Other    Neck pain, acute     · Reports neck is very sore with distribution down into shoulders and down right arm    · Reports her PCP ordered therapy to start , referral review,  indicates neck muscle strain--patient reports she anna start now that her bleed is resolved  CT Cervical  2/6/2022 No cervical spine fracture or traumatic malalignment  No subluxation  Moderate multilevel cervical degenerative changes are noted  No critical central canal stenosis  PLAN  · Patient reports she is starting physical therapy ordered by PCP                    Reason for visit is   Chief Complaint   Patient presents with    Virtual Regular Visit        Encounter provider Micki Saint, CRNP    Provider located at [address] [place]  [address]  [phone]      Recent Visits  No visits were found meeting these conditions  Showing recent visits within past 7 days and meeting all other requirements  Today's Visits  Date Type Provider Dept   02/22/22 Telephone [id number]   02/22/22 Telemedicine Hollie Zarate [facility] today's visits and meeting all other requirements  Future Appointments  No visits were found meeting these conditions  Showing future appointments within next 150 days and meeting all other requirements       The patient was identified by name and date of birth  Nanette Fermin was informed that this is a telemedicine visit and that the visit is being conducted through Telephone  My office door was closed  No one else was in the room  She acknowledged consent and understanding of privacy and security of the video platform  The patient has agreed to participate and understands they can discontinue the visit at any time  Patient is aware this is a billable service  Subjective  Nanette Fermin is a 78 y o  female    HPI     Presented in DD 2/6 2022 was hiking with friends in trail fell hitting back of head on ground , lOC unk how long , no recall of events  Friends accompanying her reported she had seizure like activity was placed on on side  EMS called  Initially evaluated at  [facility] , transfer for [address] to [facility] as trauma  [address] 2/6/2022 Small acute subarachnoid hemorrhage in the left superior frontal region  2  Posterior scalp hematoma  CT Cervical  2/6/2022 No cervical spine fracture or traumatic malalignment  No subluxation  Moderate multilevel cervical degenerative changes are noted  No critical central canal stenosis  CT Head 2/7/2021 Small focus of subarachnoid hemorrhage within the anterior left frontal vertex is grossly unchanged in size      There is no new parenchymal or extra-axial hemorrhage  Stable cerebral hemispheres  Stable brainstem and cerebellum      Referral to neuro surgery -no surgical intervention  Care as per neurosurgery protocol for [facility]  F/u in office in 2 weeks    Past Medical History:   Diagnosis Date    Essential hypertension     Exertional chest pain     Hypothyroidism     Unstable angina pectoris ([facility] [address] ) 01/28/2020       Past Surgical History:   Procedure Laterality Date    ANGIOPLASTY      CARDIAC CATHETERIZATION  01/22/2020    EF 75% There is a small LADD1 branch that has a severe stenosis   Given the small size of the vessel the plan will be to treat medically    TONSILLECTOMY         Current Outpatient Medications   Medication Sig Dispense Refill    amLODIPine (NORVASC) 5 mg tablet Take 1 tablet (5 mg total) by mouth daily 90 tablet 1    atorvastatin (LIPITOR) 80 mg tablet Take 1 tablet (80 mg total) by mouth daily at bedtime 90 tablet 1    ergocalciferol (VITAMIN D2) 50,000 units Take 1 capsule by mouth once a week 12 capsule 0    levothyroxine (Euthyrox) 125 mcg tablet Take 1 tablet (125 mcg total) by mouth daily 90 tablet 1    pantoprazole (PROTONIX) 40 mg tablet Take 1 tablet (40 mg total) by mouth daily 30 tablet 3    venlafaxine (EFFEXOR) 75 mg tablet Take 1 tablet by mouth twice daily 180 tablet 0    levETIRAcetam (KEPPRA) 500 mg tablet Take 1 tablet (500 mg total) by mouth 2 (two) times a day for 6 days (Patient not taking: Reported on 2/22/2022 ) 12 tablet 0     No current facility-administered medications for this visit  Allergies   Allergen Reactions    Other Hives and Rash     Adhesive tape         Review of Systems   Constitutional: Negative  HENT: Negative  Eyes: Negative  Respiratory: Negative  Cardiovascular: Negative  Gastrointestinal: Negative  Endocrine: Negative  Musculoskeletal: Positive for neck pain (neck pain into right arm since fall)  No collar   Skin: Negative  Allergic/Immunologic: Negative  Neurological: Negative  Hematological: Negative  Psychiatric/Behavioral: Negative  Video Exam    There were no vitals filed for this visit  Physical Exam  Vitals reviewed  Pulmonary:      Effort: Pulmonary effort is normal  No respiratory distress  Neurological:      Mental Status: She is alert and oriented to person, place, and time  Neurologic Exam     Mental Status   Oriented to person, place, and time  Oriented to person  Oriented to place  Oriented to country, city, area, street and number  Oriented to time  Oriented to year, month, date, day and season  Attention: normal    Level of consciousness: alert  Knowledge: good  Able to perform simple calculations  CT BRAIN - WITHOUT CONTRAST 2/     INDICATION:   TRAUMA      COMPARISON:  None      TECHNIQUE:  CT examination of the brain was performed  In addition to axial images, sagittal and coronal 2D reformatted images were created and submitted for interpretation      Radiation dose length product (DLP) for this visit:  817 76 mGy-cm     This examination, like all CT scans performed in the [facility], was performed utilizing techniques to minimize radiation dose exposure, including the use of iterative   reconstruction and automated exposure control        IMAGE QUALITY: Diagnostic      FINDINGS:     PARENCHYMA:  No intracranial mass, mass effect or midline shift  No CT signs of acute infarction  There is a small amount of subarachnoid hemorrhage in the left superior frontal region series 2 images 30 - 32  This measures approximately 1 2 x 0 3 cm on   the coronal reconstructions, series 402 image 43      VENTRICLES AND EXTRA-AXIAL SPACES:  Normal for the patient's age      VISUALIZED ORBITS AND PARANASAL SINUSES:  Unremarkable      CALVARIUM AND EXTRACRANIAL SOFT TISSUES:  Calvarium is intact  Scalp hematoma overlying the posterior calvarium  This measures 4 4 x 0 9 cm in axial dimensions      IMPRESSION:  1  Small acute subarachnoid hemorrhage in the left superior frontal region  2  Posterior scalp hematoma       I personally discussed this study with Dr Cristóbal Babin on 2/6/2022 at 3:10 PM       CT BRAIN - WITHOUT CONTRAST 2/7/2022      INDICATION:   I60 9: Nontraumatic subarachnoid hemorrhage, unspecified      COMPARISON:  2/7/2022     TECHNIQUE:  CT examination of the brain was performed  In addition to axial images, sagittal and coronal 2D reformatted images were created and submitted for interpretation      Radiation dose length product (DLP) for this visit:  818 46 mGy-cm   This examination, like all CT scans performed in the [facility], was performed utilizing techniques to minimize radiation dose exposure, including the use of iterative   reconstruction and automated exposure control        IMAGE QUALITY:  Diagnostic      FINDINGS:     PARENCHYMA:  No intracranial mass, mass effect or midline shift  No CT signs of acute infarction    No acute parenchymal hemorrhage        VENTRICLES AND EXTRA-AXIAL SPACES:  Normal for the patient's age      VISUALIZED ORBITS AND PARANASAL SINUSES:  Unremarkable      CALVARIUM AND EXTRACRANIAL SOFT TISSUES:  Normal      IMPRESSION:     No acute intracranial abnormality       Workstation performed: [id number]    I spent 30 minutes directly with the patient during this visit  with patient today in which greater than 50% of this time was spent in assessment, examination, impressions, reviewing imagining and recommendations for care  All questions were answered to his/her satisfaction, and contact information provided in the event additional questions arise  Patient acknowledged an understanding and agreement with plan             VIRTUAL VISIT [address] verbally agrees to participate in [employer]  Pt is aware that [employer] could be limited without vital signs or the ability to perform a full hands-on physical Candace Morrison understands she or the provider may request at any time to terminate the video visit and request the patient to seek care or treatment in person

## 2022-02-22 NOTE — TELEPHONE ENCOUNTER
Called pt to schedule 2 week F/U with CT head head prior  Patient stated she had to come out of pocket $250 00 for the CT and did not want to schedule study  Tried to at least have patient schedule follow up appointment, she declined as well  Per Shannon Adams notes follow up visit is to ensure the start up of her aspirin today has not caused any bleeding  After explaining this to patient she still refused  Patient understand to call the office is she has any symptoms or report to ED

## 2022-02-22 NOTE — PATIENT INSTRUCTIONS
Subarachnoid Hemorrhage   WHAT YOU NEED TO KNOW:   What is a subarachnoid hemorrhage [facility]? SAH is a type of hemorrhagic stroke that causes bleeding in the subarachnoid space  This space is under the protective tissues that cover the brain  SAH happens when a blood vessel bursts  SAH is a life-threatening condition that needs immediate medical care  What increases my risk for [facility]? · An aneurysm (bulging area of a blood vessel) that has not burst, or hypertension (high blood pressure)    · A head injury    · A family history of SAH, or certain genetic diseases    · Being a woman or at least 48years old    · Use of alcohol, cigarettes, or illegal drugs, such as cocaine or ecstacy    What are the warning signs of a stroke? The words BE FAST can help you remember and recognize warning signs of a stroke:  · B = Balance:  Sudden loss of balance    · E = Eyes:  Loss of vision in one or both eyes    · F = Face:  Face droops on one side    · A = Arms:  Arm drops when both arms are raised    · S = Speech:  Speech is slurred or sounds different    · T = Time:  Time to get help immediately       What are the signs and symptoms of SAH? Rarely, you may have no signs or symptoms  You may have a sudden, severe headache that is worse than any headache you have ever had  This may be described as a thunderclap headache  It may happen within seconds and worsen within minutes  You may have a headache for days or weeks before the severe headache occurs  You may also have the following along with a moderate or severe headache:  · Nausea or vomiting    · Trouble walking    · Dizziness, drowsiness, faintness, or another person cannot wake you    · Double vision, eye pain in light, eyes pointing down and out, and drooping eyelids     · Stiff neck that may be the only symptom    · Numbness on one side of your body    · Trouble talking, reading, or writing    · Seizure    How is SAH diagnosed?   Your healthcare provider will ask if you have a history of SAH, blood vessel problems, or a recent head injury  He or she will ask which medicines you are taking  You may also need the following:  · CT pictures  of your skull and brain may show bleeding in the subarachnoid space  · A lumbar puncture , or spinal tap, may be used along with a CT scan  Your healthcare provider will remove fluid from around your spinal cord  The fluid will be checked for blood  · Blood tests  may be used to check your overall health  Your blood's ability to clot will also be tested  The tests may include a check for diabetes  Diabetes increases your risk for a stroke  How is SAH treated? You may need any of the following:  · Medicines  may be given to lower your blood sugar level or blood pressure  A lower blood pressure may help prevent another SAH  It is also used to prevent stroke and heart or kidney damage caused by [facility]  You may also need medicines for the effects of brain damage, or to help prevent seizures  Pain medicine may also be needed  · Endovascular coils  may be placed inside an aneurysm to prevent it from filling with blood  This may prevent the aneurysm from bursting  · Surgery  may be used to clip off an aneurysm to keep it from bursting  What can I do to manage or prevent a SAH? SAH is often caused by medical conditions that you cannot control  Healthcare providers will help you create goals for your recovery  The following lifestyle changes can help you reach your goals and lower your risk for a stroke:  · Manage health conditions  A condition such as diabetes can increase your risk for a stroke  Control your blood sugar level if you have hyperglycemia or diabetes  Take your prescribed medicines and check your blood sugar level as directed  · Check your blood pressure as directed  High blood pressure can increase your risk for a stroke  Follow your healthcare provider's directions for controlling your blood pressure  · Do not use nicotine products or illegal drugs  Nicotine and other chemicals in cigarettes and cigars can cause blood vessel damage and raise your risk for an aneurysm  A burst aneurysm is one of the main causes of SAH  Nicotine and illegal drugs both increase your risk for a stroke  Ask your healthcare provider for information if you currently smoke or use drugs and need help to quit  E- cigarettes or smokeless tobacco still contain nicotine  Talk to your healthcare provider before you use these products  · Do not drink alcohol  Alcohol increases your risk for a stroke  Alcohol may also raise your blood pressure or thin your blood  Blood thinning can cause a hemorrhagic stroke  · Eat a variety of healthy foods  Healthy foods include whole-grain breads, low-fat dairy products, beans, lean meats, and fish  Eat at least 5 servings of fruits and vegetables each day  Choose foods that are low in fat, cholesterol, salt, and sugar  Eat foods that are high in potassium, such as potatoes and bananas  A dietitian can help you create healthy meal plans  · Maintain a healthy weight  Ask your healthcare provider what a healthy weight is for you  Ask him or her to help you create a weight loss plan if you are overweight  He or she can help you create small goals if you have a lot of weight to lose  · Exercise as directed  Exercise can lower your blood pressure, cholesterol, weight, and blood sugar levels  Healthcare providers will help you create exercise goals  They can also help you make a plan to reach your goals  For example, you can break exercise into 10 minute periods, 3 times in a day  Find an exercise that you enjoy  This will make it easier for you to reach your exercise goals  · Manage stress  Stress can raise your blood pressure  Find new ways to relax, such as deep breathing or listening to music  Where can I find more information?    · American Heart Association and American Stroke Association  [address] [facility]   Phone: 3- 373 - 966-3507  Web Address: https://[employer]/  org    Have someone call your local emergency number (911 in the [address]) if:   · You have any of the following signs of a stroke:      ? Numbness or drooping on one side of your face     ? Weakness in an arm or leg    ? Confusion or difficulty speaking    ? Dizziness, a severe headache, or vision loss       · You have a mild to moderate headache that lasts a few days  · You have eye pain when you are in bright light, or you see double  · You have a seizure, or someone sees you lose consciousness  When should I call my doctor? · You have neck stiffness that does not go away  · You have questions or concerns about your condition or care  CARE AGREEMENT:   You have the right to help plan your care  Learn about your health condition and how it may be treated  Discuss treatment options with your healthcare providers to decide what care you want to receive  You always have the right to refuse treatment  The above information is an  only  It is not intended as medical advice for individual conditions or treatments  Talk to your doctor, nurse or pharmacist before following any medical regimen to see if it is safe and effective for you  © Copyright [employer] 2021 Information is for End User's use only and may not be sold, redistributed or otherwise used for commercial purposes   All illustrations and images included in CareNotes® are the copyrighted property of [employer]  or [address] [place] [address] [employer]

## 2022-03-02 DIAGNOSIS — E78.2 MIXED HYPERLIPIDEMIA: ICD-10-CM

## 2022-03-02 RX ORDER — ATORVASTATIN CALCIUM 80 MG/1
TABLET, FILM COATED ORAL
Qty: 15 TABLET | Refills: 0 | Status: SHIPPED | OUTPATIENT
Start: 2022-03-02 | End: 2022-04-06 | Stop reason: SDUPTHER

## 2022-04-06 DIAGNOSIS — E55.9 VITAMIN D DEFICIENCY: ICD-10-CM

## 2022-04-06 DIAGNOSIS — F32.A DEPRESSIVE DISORDER: ICD-10-CM

## 2022-04-06 DIAGNOSIS — E78.2 MIXED HYPERLIPIDEMIA: ICD-10-CM

## 2022-04-06 DIAGNOSIS — F41.9 ANXIETY: ICD-10-CM

## 2022-04-06 DIAGNOSIS — I60.9 SAH (SUBARACHNOID HEMORRHAGE) (HCC): ICD-10-CM

## 2022-04-06 RX ORDER — VENLAFAXINE 75 MG/1
75 TABLET ORAL 2 TIMES DAILY
Qty: 180 TABLET | Refills: 1 | Status: SHIPPED | OUTPATIENT
Start: 2022-04-06

## 2022-04-06 RX ORDER — ATORVASTATIN CALCIUM 80 MG/1
80 TABLET, FILM COATED ORAL
Qty: 15 TABLET | Refills: 2 | Status: SHIPPED | OUTPATIENT
Start: 2022-04-06

## 2022-04-06 RX ORDER — ERGOCALCIFEROL 1.25 MG/1
50000 CAPSULE ORAL WEEKLY
Qty: 12 CAPSULE | Refills: 1 | OUTPATIENT
Start: 2022-04-06

## 2022-04-20 NOTE — PROGRESS NOTES
Patient unable to afford the OOP expense for the [facility] per note from [place], [place]

## 2022-05-02 ENCOUNTER — PATIENT MESSAGE (OUTPATIENT)
Dept: GASTROENTEROLOGY | Facility: CLINIC | Age: 67
End: 2022-05-02

## 2022-05-02 DIAGNOSIS — K21.9 GASTROESOPHAGEAL REFLUX DISEASE, UNSPECIFIED WHETHER ESOPHAGITIS PRESENT: ICD-10-CM

## 2022-05-03 RX ORDER — PANTOPRAZOLE SODIUM 40 MG/1
40 TABLET, DELAYED RELEASE ORAL DAILY
Qty: 30 TABLET | Refills: 0 | Status: SHIPPED | OUTPATIENT
Start: 2022-05-03 | End: 2022-06-22

## 2022-05-03 NOTE — TELEPHONE ENCOUNTER
Last office visit: 9/29/21  Next office visit: not scheduled, overdue since 12/2021  EGD done on 10/2021    I did mychart message patient advising she is overdue for follow up  Medication pending

## 2022-05-05 ENCOUNTER — RA CDI HCC (OUTPATIENT)
Dept: OTHER | Facility: HOSPITAL | Age: 67
End: 2022-05-05

## 2022-05-05 NOTE — PROGRESS NOTES
[address]  coding opportunities       Chart reviewed, no opportunity found: CHART REVIEWED, NO OPPORTUNITY FOUND        Patients Insurance     Medicare Insurance: Medicare

## 2022-05-17 ENCOUNTER — APPOINTMENT (OUTPATIENT)
Dept: LAB | Facility: CLINIC | Age: 67
End: 2022-05-17
Payer: COMMERCIAL

## 2022-05-17 ENCOUNTER — TELEPHONE (OUTPATIENT)
Dept: OBGYN CLINIC | Facility: CLINIC | Age: 67
End: 2022-05-17

## 2022-05-17 DIAGNOSIS — E78.2 MIXED HYPERLIPIDEMIA: ICD-10-CM

## 2022-05-17 DIAGNOSIS — E03.9 HYPOTHYROIDISM, UNSPECIFIED TYPE: ICD-10-CM

## 2022-05-17 LAB
CHOLEST SERPL-MCNC: 138 MG/DL
HDLC SERPL-MCNC: 48 MG/DL
LDLC SERPL CALC-MCNC: 76 MG/DL (ref 0–100)
NONHDLC SERPL-MCNC: 90 MG/DL
T4 FREE SERPL-MCNC: 0.79 NG/DL (ref 0.76–1.46)
TRIGL SERPL-MCNC: 71 MG/DL
TSH SERPL DL<=0.05 MIU/L-ACNC: 3.61 UIU/ML (ref 0.45–4.5)

## 2022-05-17 PROCEDURE — 84439 ASSAY OF FREE THYROXINE: CPT

## 2022-05-17 PROCEDURE — 84443 ASSAY THYROID STIM HORMONE: CPT

## 2022-05-17 PROCEDURE — 36415 COLL VENOUS BLD VENIPUNCTURE: CPT

## 2022-05-17 PROCEDURE — 80061 LIPID PANEL: CPT

## 2022-05-25 ENCOUNTER — OFFICE VISIT (OUTPATIENT)
Dept: OBGYN CLINIC | Facility: CLINIC | Age: 67
End: 2022-05-25
Payer: COMMERCIAL

## 2022-05-25 VITALS
HEIGHT: 68 IN | TEMPERATURE: 97.8 F | DIASTOLIC BLOOD PRESSURE: 69 MMHG | SYSTOLIC BLOOD PRESSURE: 109 MMHG | HEART RATE: 89 BPM | BODY MASS INDEX: 29.7 KG/M2 | WEIGHT: 196 LBS

## 2022-05-25 DIAGNOSIS — G89.29 CHRONIC RIGHT-SIDED LOW BACK PAIN, UNSPECIFIED WHETHER SCIATICA PRESENT: ICD-10-CM

## 2022-05-25 DIAGNOSIS — M16.11 PRIMARY OSTEOARTHRITIS OF ONE HIP, RIGHT: ICD-10-CM

## 2022-05-25 DIAGNOSIS — M54.50 CHRONIC RIGHT-SIDED LOW BACK PAIN, UNSPECIFIED WHETHER SCIATICA PRESENT: ICD-10-CM

## 2022-05-25 DIAGNOSIS — G89.29 CHRONIC RIGHT HIP PAIN: Primary | ICD-10-CM

## 2022-05-25 DIAGNOSIS — M25.551 CHRONIC RIGHT HIP PAIN: Primary | ICD-10-CM

## 2022-05-25 PROCEDURE — 99214 OFFICE O/P EST MOD 30 MIN: CPT | Performed by: FAMILY MEDICINE

## 2022-05-25 NOTE — PROGRESS NOTES
[facility] [address] [facility]  [id number] [place] [id number] [phone]      Chief Complaint:  Chief Complaint   Patient presents with    Right Hip - Pain       Vitals:  /69 (BP Location: Left arm, Patient Position: Sitting, Cuff Size: Large)   Pulse 89   Temp 97 8 °F (36 6 °C) (Tympanic)   Ht 5' 8" (1 727 m)   Wt 88 9 kg (196 lb)   BMI 29 80 kg/m²     The following portions of the patient's history were reviewed and updated as appropriate: allergies, current medications, past family history, past medical history, past social history, past surgical history, and problem list       Subjective:   Patient ID: Shantel Martinez is a 78 y o  female  Here c/o R hip pain  Pain for about 1year  Pain with walking  Stopped walking due to pain  Had G trochanteric injection- no help  Worse sleeping  Sharp pain  No pain meds  Nothing improves pain  Intermittent lowe back pain  Radiates down R leg at times  Denies n/t  Denies hx of CA or change/bowel/bladder  Lower back for about 1 year      Review of Systems   Constitutional: Negative for fatigue and fever  Respiratory: Negative for shortness of breath  Cardiovascular: Negative for chest pain  Gastrointestinal: Negative for abdominal pain and nausea  Genitourinary: Negative for dysuria  Musculoskeletal: Positive for arthralgias and gait problem  Skin: Negative for rash and wound  Neurological: Negative for weakness and headaches  Objective:  Right Hip Exam     Tenderness   The patient is experiencing tenderness in the anterior and greater trochanter  Range of Motion   The patient has normal right hip ROM  Muscle Strength   The patient has normal right hip strength  Tests   AMINTA: positive    Comments:  Pain with internal rotation      Back Exam     Tenderness   The patient is experiencing no tenderness  Range of Motion   The patient has normal back ROM      Muscle Strength   The patient has normal back strength  Reflexes   Patellar: normal            Physical Exam  Constitutional:       Appearance: Normal appearance  She is normal weight  HENT:      Head: Normocephalic  Eyes:      Extraocular Movements: Extraocular movements intact  Pulmonary:      Effort: Pulmonary effort is normal    Musculoskeletal:      Cervical back: Normal range of motion  Skin:     General: Skin is warm and dry  Neurological:      General: No focal deficit present  Mental Status: She is alert and oriented to person, place, and time  Mental status is at baseline  Psychiatric:         Mood and Affect: Mood normal          Behavior: Behavior normal          Thought Content: Thought content normal          Judgment: Judgment normal          I have personally reviewed pertinent films in PACS and my interpretation is XR- R hip- mod OA  XR L spine-mod DJDk no fx  Assessment/Plan:  Assessment/Plan   Diagnoses and all orders for this visit:    Chronic right hip pain    Primary osteoarthritis of one hip, right    Chronic right-sided low back pain, unspecified whether sciatica present  -     XR spine lumbar 2 or 3 views injury; Future  -     Ambulatory Referral to Physical Therapy; Future        Return in about 6 weeks (around 7/6/2022) for Recheck       Tyson Link MD

## 2022-05-25 NOTE — PATIENT INSTRUCTIONS
F/u 6 wks  Begin physical therapy  Icing/heat/OTC pain meds as needed  Home exercises    R hip OA vs lower back pain

## 2022-05-31 ENCOUNTER — OFFICE VISIT (OUTPATIENT)
Dept: FAMILY MEDICINE CLINIC | Facility: CLINIC | Age: 67
End: 2022-05-31
Payer: COMMERCIAL

## 2022-05-31 VITALS
BODY MASS INDEX: 30.55 KG/M2 | WEIGHT: 201.6 LBS | SYSTOLIC BLOOD PRESSURE: 130 MMHG | TEMPERATURE: 97.6 F | OXYGEN SATURATION: 98 % | HEART RATE: 71 BPM | DIASTOLIC BLOOD PRESSURE: 86 MMHG | HEIGHT: 68 IN

## 2022-05-31 DIAGNOSIS — R23.8 OTHER SKIN CHANGES: ICD-10-CM

## 2022-05-31 DIAGNOSIS — H02.826 MILIA OF EYELIDS OF BOTH EYES: ICD-10-CM

## 2022-05-31 DIAGNOSIS — M79.604 RIGHT LEG PAIN: ICD-10-CM

## 2022-05-31 DIAGNOSIS — M25.551 RIGHT HIP PAIN: ICD-10-CM

## 2022-05-31 DIAGNOSIS — Z13.820 OSTEOPOROSIS SCREENING: ICD-10-CM

## 2022-05-31 DIAGNOSIS — H02.823 MILIA OF EYELIDS OF BOTH EYES: ICD-10-CM

## 2022-05-31 DIAGNOSIS — Z91.89 PNEUMOCOCCAL VACCINATION INDICATED: ICD-10-CM

## 2022-05-31 DIAGNOSIS — Z00.00 MEDICARE ANNUAL WELLNESS VISIT, SUBSEQUENT: Primary | ICD-10-CM

## 2022-05-31 PROCEDURE — G0009 ADMIN PNEUMOCOCCAL VACCINE: HCPCS

## 2022-05-31 PROCEDURE — 99213 OFFICE O/P EST LOW 20 MIN: CPT

## 2022-05-31 PROCEDURE — 90677 PCV20 VACCINE IM: CPT

## 2022-05-31 PROCEDURE — G0439 PPPS, SUBSEQ VISIT: HCPCS

## 2022-05-31 NOTE — PROGRESS NOTES
Assessment and Plan:     Problem List Items Addressed This Visit    None     Visit Diagnoses     Medicare annual wellness visit, subsequent    -  Primary    Osteoporosis screening        Relevant Orders    DXA bone density spine hip and pelvis    BMI 30 0-30 9,adult        Right hip pain        Right leg pain        Pneumococcal vaccination indicated        Relevant Orders    Pneumococcal Conjugate Vaccine 20-valent (Pcv20) (Completed)    Other skin changes        several moles, freckles, sun damage spots; encourage yearly skin cancer checks    Relevant Orders    Ambulatory Referral to Dermatology    Milia of eyelids of both eyes        Relevant Orders    Ambulatory Referral to Dermatology            Preventive health issues were discussed with patient, and age appropriate screening tests were ordered as noted in patient's After Visit Summary  Personalized health advice and appropriate referrals for health education or preventive services given if needed, as noted in patient's After Visit Summary       History of Present Illness:     Patient presents for Medicare Annual Wellness visit    Patient Care Team:  Basil Colón PA-C as PCP - General (Family Medicine)     Problem List:     Patient Active Problem List   Diagnosis    BMI 28 0-28 9,adult    Acute medial meniscus tear of right knee    Anxiety    Contact dermatitis and other eczema, due to unspecified cause    Depressive disorder    Essential hypertension    Reflux involving intestinal tract    Hypothyroidism    Internal derangement of right knee    Mixed hyperlipidemia    NSTEMI, initial episode of care [facility])    Coronary artery disease involving native coronary artery of native heart without angina pectoris    Fall from slipping on ice    SAH (subarachnoid hemorrhage) ([address] )    Neck pain, acute      Past Medical and Surgical History:     Past Medical History:   Diagnosis Date    Depression 1969    Disease of thyroid gland     Synthroid    Essential hypertension     Exertional chest pain     Fractures     - rt lower leg break, rt arm, break rt  pinky finger, ribs    Heart disease     Hypothyroidism     Stomach disorder     Unstable angina pectoris ([facility] [address] ) 2020     Past Surgical History:   Procedure Laterality Date    ANGIOPLASTY      CARDIAC CATHETERIZATION  2020    EF 75% There is a small LADD1 branch that has a severe stenosis   Given the small size of the vessel the plan will be to treat medically    TONSILLECTOMY        Family History:     Family History   Problem Relation Age of Onset    Thyroid disease Mother     Cancer Mother          80 yrs old lung cancer    Heart attack Father     Heart attack Brother     Thyroid disease Maternal Grandmother     Cancer Maternal Grandmother     Diabetes Maternal Grandfather     Heart attack Maternal Grandfather     Heart attack Paternal Grandfather     Heart attack Maternal Uncle       Social History:     Social History     Socioeconomic History    Marital status: Single     Spouse name: None    Number of children: None    Years of education: None    Highest education level: None   Occupational History    None   Tobacco Use    Smoking status: Former Smoker     Packs/day: 1 00     Years: 30 00     Pack years: 30 00     Types: Cigarettes    Smokeless tobacco: Former User     Quit date: 2009   Vaping Use    Vaping Use: Never used   Substance and Sexual Activity    Alcohol use: Not Currently     Comment: None for 10 yrs, rarely historically    Drug use: Never    Sexual activity: Not Currently     Partners: Female     Birth control/protection: None   Other Topics Concern    None   Social History Narrative    None     Social Determinants of Health     Financial Resource Strain: Not on file   Food Insecurity: No Food Insecurity    Worried About Running Out of Food in the Last Year: Never true    Ora of Food in the Last Year: Never true   Transportation Needs: No Transportation Needs    Lack of Transportation (Medical): No    Lack of Transportation (Non-Medical): No   Physical Activity: Not on file   Stress: Not on file   Social Connections: Not on file   Intimate Partner Violence: Not on file   Housing Stability: Unknown    Unable to Pay for Housing in the Last Year: No    Number of Places Lived in the Last Year: Not on file    Unstable Housing in the Last Year: No      Medications and Allergies:     Current Outpatient Medications   Medication Sig Dispense Refill    amLODIPine (NORVASC) 5 mg tablet Take 1 tablet (5 mg total) by mouth daily 90 tablet 1    atorvastatin (LIPITOR) 80 mg tablet Take 1 tablet (80 mg total) by mouth daily at bedtime 15 tablet 2    levothyroxine (Euthyrox) 125 mcg tablet Take 1 tablet (125 mcg total) by mouth daily 90 tablet 1    pantoprazole (PROTONIX) 40 mg tablet Take 1 tablet (40 mg total) by mouth daily 30 tablet 0    venlafaxine (EFFEXOR) 75 mg tablet Take 1 tablet (75 mg total) by mouth 2 (two) times a day 180 tablet 1     No current facility-administered medications for this visit       Allergies   Allergen Reactions    Other Hives and Rash     Adhesive tape        Immunizations:     Immunization History   Administered Date(s) Administered    COVID-19 MODERNA VACC 0 25 ML IM BOOSTER 11/29/2021    COVID-19 MODERNA VACC 0 5 ML IM 03/18/2021, 04/16/2021    Lyme Disease 08/01/2003    Pneumococcal Conjugate Vaccine 20-valent (Pcv20), Polysace 05/31/2022    Pneumococcal Polysaccharide PPV23 01/29/2020    Rotavirus Tetravalent 08/01/2016    Tuberculin Skin Test-PPD Intradermal 05/19/2011    Unknown 08/01/2016      Health Maintenance:         Topic Date Due    Breast Cancer Screening: Mammogram  02/10/2025 (Originally 2/20/1996)    Colorectal Cancer Screening  06/07/2024    Hepatitis C Screening  Completed         Topic Date Due    DTaP,Tdap,and Td Vaccines (1 - Tdap) Never done    COVID-19 Vaccine (4 - Booster for Jane Khan series) 03/29/2022    Influenza Vaccine (Season Ended) 09/01/2022      Medicare Health Risk Assessment:     /86 (BP Location: Left arm, Patient Position: Sitting, Cuff Size: Standard)   Pulse 71   Temp 97 6 °F (36 4 °C) (Tympanic)   Ht 5' 8" (1 727 m)   Wt 91 4 kg (201 lb 9 6 oz)   SpO2 98%   BMI 30 65 kg/m²      Jonh Darnell is here for her Subsequent Wellness visit  Health Risk Assessment:   Patient rates overall health as good  Patient feels that their physical health rating is same  Patient is satisfied with their life  Eyesight was rated as same  Hearing was rated as same  Patient feels that their emotional and mental health rating is same  Patients states they are sometimes angry  Patient states they are often unusually tired/fatigued  Pain experienced in the last 7 days has been some  Patient's pain rating has been 9/10  Patient states that she has experienced no weight loss or gain in last 6 months  Fall Risk Screening: In the past year, patient has experienced: history of falling in past year    Number of falls: 2 or more  Injured during fall?: Yes    Feels unsteady when standing or walking?: Yes    Worried about falling?: Yes      Urinary Incontinence Screening:   Patient has not leaked urine accidently in the last six months  Home Safety:  Patient has trouble with stairs inside or outside of their home  Patient has working smoke alarms and has working carbon monoxide detector  Home safety hazards include: loose rugs on the floor and poor household lighting  Nutrition:   Current diet is Regular  Medications:   Patient is currently taking over-the-counter supplements  OTC medications include: see medication list  Patient is able to manage medications  Activities of Daily Living (ADLs)/Instrumental Activities of Daily Living (IADLs):   Walk and transfer into and out of bed and chair?: Yes  Dress and groom yourself?: Yes    Bathe or shower yourself?: Yes    Feed yourself? Yes  Do your laundry/housekeeping?: Yes  Manage your money, pay your bills and track your expenses?: Yes  Make your own meals?: Yes    Do your own shopping?: Yes    Previous Hospitalizations:   Any hospitalizations or ED visits within the last 12 months?: Yes    How many hospitalizations have you had in the last year?: 1-2    Advance Care Planning:   Living will: No    Durable POA for healthcare: No    Advanced directive: No      Comments: AD given to complete       Cognitive Screening:   Provider or family/friend/caregiver concerned regarding cognition?: No    PREVENTIVE SCREENINGS      Cardiovascular Screening:    General: Screening Not Indicated and History Lipid Disorder      Diabetes Screening:     General: Screening Current      Colorectal Cancer Screening:     General: Screening Current      Breast Cancer Screening:     General: Patient Declines      Cervical Cancer Screening:    General: Screening Not Indicated and Patient Declines      Osteoporosis Screening:      Due for: Bone Density Ultrasound      Abdominal Aortic Aneurysm (AAA) Screening:        General: Screening Current      Lung Cancer Screening:     General: Screening Not Indicated      Hepatitis C Screening:    General: Screening Current    Screening, Brief Intervention, and Referral to Treatment (SBIRT)    Screening  Typical number of drinks in a day: 0  Typical number of drinks in a week: 0  Interpretation: Low risk drinking behavior      Single Item Drug Screening:  How often have you used an illegal drug (including marijuana) or a prescription medication for non-medical reasons in the past year? never    Single Item Drug Screen Score: 0  Interpretation: Negative screen for possible drug use disorder      Celia Payan PA-C

## 2022-05-31 NOTE — PATIENT INSTRUCTIONS
Medicare Preventive Visit Patient Instructions  Thank you for completing your Welcome to Medicare Visit or Medicare Annual Wellness Visit today  Your next wellness visit will be due in one year (6/1/2023)  The screening/preventive services that you may require over the next 5-10 years are detailed below  Some tests may not apply to you based off risk factors and/or age  Screening tests ordered at today's visit but not completed yet may show as past due  Also, please note that scanned in results may not display below  Preventive Screenings:  Service Recommendations Previous Testing/Comments   Colorectal Cancer Screening  * Colonoscopy    * Fecal Occult Blood Test (FOBT)/Fecal Immunochemical Test (FIT)  * Fecal DNA/Cologuard Test  * Flexible Sigmoidoscopy Age: 54-65 years old   Colonoscopy: every 10 years (may be performed more frequently if at higher risk)  OR  FOBT/FIT: every 1 year  OR  Cologuard: every 3 years  OR  Sigmoidoscopy: every 5 years  Screening may be recommended earlier than age 48 if at higher risk for colorectal cancer  Also, an individualized decision between you and your healthcare provider will decide whether screening between the ages of 74-80 would be appropriate  Colonoscopy: 02/17/2020  FOBT/FIT: 02/16/2020  Cologuard: 06/07/2021  Sigmoidoscopy: Not on file    Screening Current     Breast Cancer Screening Age: 36 years old  Frequency: every 1-2 years  Not required if history of left and right mastectomy Mammogram: Not on file        Cervical Cancer Screening Between the ages of 21-29, pap smear recommended once every 3 years  Between the ages of 33-67, can perform pap smear with HPV co-testing every 5 years     Recommendations may differ for women with a history of total hysterectomy, cervical cancer, or abnormal pap smears in past  Pap Smear: Not on file    Screening Not Indicated   Hepatitis C Screening Once for adults born between [facility]  More frequently in patients at high risk for Hepatitis C Hep C Antibody: 02/17/2020    Screening Current   Diabetes Screening 1-2 times per year if you're at risk for diabetes or have pre-diabetes Fasting glucose: 116 mg/dL   A1C: 5 6 %    Screening Current   Cholesterol Screening Once every 5 years if you don't have a lipid disorder  May order more often based on risk factors  Lipid panel: 05/17/2022    Screening Not Indicated  History Lipid Disorder     Other Preventive Screenings Covered by Medicare:  1  Abdominal Aortic Aneurysm (AAA) Screening: covered once if your at risk  You're considered to be at risk if you have a family history of AAA  2  Lung Cancer Screening: covers low dose CT scan once per year if you meet all of the following conditions: (1) Age 50-69; (2) No signs or symptoms of lung cancer; (3) Current smoker or have quit smoking within the last 15 years; (4) You have a tobacco smoking history of at least 30 pack years (packs per day multiplied by number of years you smoked); (5) You get a written order from a healthcare provider  3  Glaucoma Screening: covered annually if you're considered high risk: (1) You have diabetes OR (2) Family history of glaucoma OR (3)  aged 48 and older OR (3)  American aged 72 and older  3  Osteoporosis Screening: covered every 2 years if you meet one of the following conditions: (1) You're estrogen deficient and at risk for osteoporosis based off medical history and other findings; (2) Have a vertebral abnormality; (3) On glucocorticoid therapy for more than 3 months; (4) Have primary hyperparathyroidism; (5) On osteoporosis medications and need to assess response to drug therapy  · Last bone density test (DXA Scan): Not on file  5  HIV Screening: covered annually if you're between the age of 12-76  Also covered annually if you are younger than 13 and older than 72 with risk factors for HIV infection   For pregnant patients, it is covered up to 3 times per pregnancy  Immunizations:  Immunization Recommendations   Influenza Vaccine Annual influenza vaccination during flu season is recommended for all persons aged >= 6 months who do not have contraindications   Pneumococcal Vaccine (Prevnar and Pneumovax)  * Prevnar = PCV13  * Pneumovax = PPSV23   Adults 25-60 years old: 1-3 doses may be recommended based on certain risk factors  Adults 72 years old: Prevnar (PCV13) vaccine recommended followed by Pneumovax (PPSV23) vaccine  If already received PPSV23 since turning 65, then PCV13 recommended at least one year after PPSV23 dose  Hepatitis B Vaccine 3 dose series if at intermediate or high risk (ex: diabetes, end stage renal disease, liver disease)   Tetanus (Td) Vaccine - COST NOT COVERED BY MEDICARE PART B Following completion of primary series, a booster dose should be given every 10 years to maintain immunity against tetanus  Td may also be given as tetanus wound prophylaxis  Tdap Vaccine - COST NOT COVERED BY MEDICARE PART B Recommended at least once for all adults  For pregnant patients, recommended with each pregnancy  Shingles Vaccine (Shingrix) - COST NOT COVERED BY MEDICARE PART B  2 shot series recommended in those aged 48 and above     Health Maintenance Due:      Topic Date Due    Breast Cancer Screening: Mammogram  02/10/2025 (Originally 2/20/1996)    Colorectal Cancer Screening  06/07/2024    Hepatitis C Screening  Completed     Immunizations Due:      Topic Date Due    DTaP,Tdap,and Td Vaccines (1 - Tdap) Never done    Pneumococcal Vaccine: 65+ Years (2 - PCV) 01/29/2021    COVID-19 Vaccine (4 - Booster for Colvin Tripp series) 03/29/2022     Advance Directives   What are advance directives? Advance directives are legal documents that state your wishes and plans for medical care  These plans are made ahead of time in case you lose your ability to make decisions for yourself   Advance directives can apply to any medical decision, such as the treatments you want, and if you want to donate organs  What are the types of advance directives? There are many types of advance directives, and each state has rules about how to use them  You may choose a combination of any of the following:  · Living will: This is a written record of the treatment you want  You can also choose which treatments you do not want, which to limit, and which to stop at a certain time  This includes surgery, medicine, IV fluid, and tube feedings  · Durable power of  for healthcare [facility]): This is a written record that states who you want to make healthcare choices for you when you are unable to make them for yourself  This person, called a proxy, is usually a family member or a friend  You may choose more than 1 proxy  · Do not resuscitate (DNR) order:  A DNR order is used in case your heart stops beating or you stop breathing  It is a request not to have certain forms of treatment, such as CPR  A DNR order may be included in other types of advance directives  · Medical directive: This covers the care that you want if you are in a coma, near death, or unable to make decisions for yourself  You can list the treatments you want for each condition  Treatment may include pain medicine, surgery, blood transfusions, dialysis, IV or tube feedings, and a ventilator (breathing machine)  · Values history: This document has questions about your views, beliefs, and how you feel and think about life  This information can help others choose the care that you would choose  Why are advance directives important? An advance directive helps you control your care  Although spoken wishes may be used, it is better to have your wishes written down  Spoken wishes can be misunderstood, or not followed  Treatments may be given even if you do not want them  An advance directive may make it easier for your family to make difficult choices about your care     Fall Prevention    Fall prevention includes ways to make your home and other areas safer  It also includes ways you can move more carefully to prevent a fall  Health conditions that cause changes in your blood pressure, vision, or muscle strength and coordination may increase your risk for falls  Medicines may also increase your risk for falls if they make you dizzy, weak, or sleepy  Fall prevention tips:   · Stand or sit up slowly  · Use assistive devices as directed  · Wear shoes that fit well and have soles that   · Wear a personal alarm  · Stay active  · Manage your medical conditions  Home Safety Tips:  · Add items to prevent falls in the bathroom  · Keep paths clear  · Install bright lights in your home  · Keep items you use often on shelves within reach  · Paint or place reflective tape on the edges of your stairs  Weight Management   Why it is important to manage your weight:  Being overweight increases your risk of health conditions such as heart disease, high blood pressure, type 2 diabetes, and certain types of cancer  It can also increase your risk for osteoarthritis, sleep apnea, and other respiratory problems  Aim for a slow, steady weight loss  Even a small amount of weight loss can lower your risk of health problems  How to lose weight safely:  A safe and healthy way to lose weight is to eat fewer calories and get regular exercise  You can lose up about 1 pound a week by decreasing the number of calories you eat by 500 calories each day  Healthy meal plan for weight management:  A healthy meal plan includes a variety of foods, contains fewer calories, and helps you stay healthy  A healthy meal plan includes the following:  · Eat whole-grain foods more often  A healthy meal plan should contain fiber  Fiber is the part of grains, fruits, and vegetables that is not broken down by your body  Whole-grain foods are healthy and provide extra fiber in your diet   Some examples of whole-grain foods are whole-wheat breads and pastas, oatmeal, brown rice, and bulgur  · Eat a variety of vegetables every day  Include dark, leafy greens such as spinach, kale, trevor greens, and mustard greens  Eat yellow and orange vegetables such as carrots, sweet potatoes, and winter squash  · Eat a variety of fruits every day  Choose fresh or canned fruit (canned in its own juice or light syrup) instead of juice  Fruit juice has very little or no fiber  · Eat low-fat dairy foods  Drink fat-free (skim) milk or 1% milk  Eat fat-free yogurt and low-fat cottage cheese  Try low-fat cheeses such as mozzarella and other reduced-fat cheeses  · Choose meat and other protein foods that are low in fat  Choose beans or other legumes such as split peas or lentils  Choose fish, skinless poultry (chicken or turkey), or lean cuts of red meat (beef or pork)  Before you cook meat or poultry, cut off any visible fat  · Use less fat and oil  Try baking foods instead of frying them  Add less fat, such as margarine, sour cream, regular salad dressing and mayonnaise to foods  Eat fewer high-fat foods  Some examples of high-fat foods include french fries, doughnuts, ice cream, and cakes  · Eat fewer sweets  Limit foods and drinks that are high in sugar  This includes candy, cookies, regular soda, and sweetened drinks  Exercise:  Exercise at least 30 minutes per day on most days of the week  Some examples of exercise include walking, biking, dancing, and swimming  You can also fit in more physical activity by taking the stairs instead of the elevator or parking farther away from stores  Ask your healthcare provider about the best exercise plan for you  © Copyright [employer] 2018 Information is for End User's use only and may not be sold, redistributed or otherwise used for commercial purposes   All illustrations and images included in CareNotes® are the copyrighted property of A [employer]  or [address] [place] [address] [employer]

## 2022-05-31 NOTE — PROGRESS NOTES
Routine Follow-up    Faviola Mcfadden 78 y o  female   Date:  5/31/2022      Assessment and Plan:    Gasper Enriquez was seen today for medicare wellness visit  Diagnoses and all orders for this visit:    Medicare annual wellness visit, subsequent    Osteoporosis screening  -     DXA bone density spine hip and pelvis; Future    BMI 30 0-30 9,adult    Right hip pain;Right leg pain  - appreciate ortho follow up, unfortunately no relief from injection  - encourage Pt, even if goes for initial evaluation for home exercise regimen   - defers pain medication; appropriate to use otc tylenol or motrin  - not consistent with radicular pain, no particular dermatome    Pneumococcal vaccination indicated  -     Pneumococcal Conjugate Vaccine 20-valent (Pcv20)    Other skin changes  Comments:  several moles, freckles, sun damage spots; encourage yearly skin cancer checks  Orders:  -     Ambulatory Referral to Dermatology; Future    Milia of eyelids of both eyes  -     Ambulatory Referral to Dermatology; Future          Falls Plan of Care: Patient had mechanical fall on ice  HPI:  Chief Complaint   Patient presents with    Medicare Wellness Visit     HPI   Patient is a 79 yo female who presents for AWV and follow up  Patient has been struggling with R hip and leg pain  She did see ortho  She was recommended to start PT but her copay is cost prohibitive  She has a lot of stress with having to pay bills  This pain does not feel similar to sciatica  The pain is generalized through hip down leg to and knee  She completed injections for sciatic with relief in the past  She had no relief from hip injections  Since her fall, she has been less active  She does not want to take any medication for pain due to stating that she has an addictive personality  ROS: Review of Systems   Respiratory: Negative  Cardiovascular: Negative  Musculoskeletal: Positive for arthralgias  Negative for gait problem  Neurological: Negative  Past Medical History:   Diagnosis Date    Depression 1969    Disease of thyroid gland     Synthroid    Essential hypertension     Exertional chest pain     Fractures     - rt lower leg break, rt arm, break rt  pinky finger, ribs    Heart disease     Hypothyroidism     Stomach disorder     Unstable angina pectoris ([address] ) 01/28/2020     Patient Active Problem List   Diagnosis    BMI 28 0-28 9,adult    Acute medial meniscus tear of right knee    Anxiety    Contact dermatitis and other eczema, due to unspecified cause    Depressive disorder    Essential hypertension    Reflux involving intestinal tract    Hypothyroidism    Internal derangement of right knee    Mixed hyperlipidemia    NSTEMI, initial episode of care [facility])    Coronary artery disease involving native coronary artery of native heart without angina pectoris    Fall from slipping on ice    SAH [facility]achnoid hemorrhage) [facility])    Neck pain, acute       Past Surgical History:   Procedure Laterality Date    ANGIOPLASTY      CARDIAC CATHETERIZATION  01/22/2020    EF 75% There is a small LADD1 branch that has a severe stenosis   Given the small size of the vessel the plan will be to treat medically    TONSILLECTOMY         Social History     Socioeconomic History    Marital status: Single     Spouse name: None    Number of children: None    Years of education: None    Highest education level: None   Occupational History    None   Tobacco Use    Smoking status: Former Smoker     Packs/day: 1 00     Years: 30 00     Pack years: 30 00     Types: Cigarettes    Smokeless tobacco: Former User     Quit date: 2/5/2009   Vaping Use    Vaping Use: Never used   Substance and Sexual Activity    Alcohol use: Not Currently     Comment: None for 10 yrs, rarely historically    Drug use: Never    Sexual activity: Not Currently     Partners: Female     Birth control/protection: None   Other Topics Concern    None   Social History Narrative  None     Social Determinants of Health     Financial Resource Strain: Not on file   Food Insecurity: No Food Insecurity    Worried About Running Out of Food in the Last Year: Never true    Ora of Food in the Last Year: Never true   Transportation Needs: No Transportation Needs    Lack of Transportation (Medical): No    Lack of Transportation (Non-Medical):  No   Physical Activity: Not on file   Stress: Not on file   Social Connections: Not on file   Intimate Partner Violence: Not on file   Housing Stability: Unknown    Unable to Pay for Housing in the Last Year: No    Number of Places Lived in the Last Year: Not on file    Unstable Housing in the Last Year: No       Family History   Problem Relation Age of Onset    Thyroid disease Mother     Cancer Mother          80 yrs old lung cancer    Heart attack Father     Heart attack Brother     Thyroid disease Maternal Grandmother     Cancer Maternal Grandmother     Diabetes Maternal Grandfather     Heart attack Maternal Grandfather     Heart attack Paternal Grandfather     Heart attack Maternal Uncle        Allergies   Allergen Reactions    Other Hives and Rash     Adhesive tape           Current Outpatient Medications:     amLODIPine (NORVASC) 5 mg tablet, Take 1 tablet (5 mg total) by mouth daily, Disp: 90 tablet, Rfl: 1    atorvastatin (LIPITOR) 80 mg tablet, Take 1 tablet (80 mg total) by mouth daily at bedtime, Disp: 15 tablet, Rfl: 2    levothyroxine (Euthyrox) 125 mcg tablet, Take 1 tablet (125 mcg total) by mouth daily, Disp: 90 tablet, Rfl: 1    pantoprazole (PROTONIX) 40 mg tablet, Take 1 tablet (40 mg total) by mouth daily, Disp: 30 tablet, Rfl: 0    venlafaxine (EFFEXOR) 75 mg tablet, Take 1 tablet (75 mg total) by mouth 2 (two) times a day, Disp: 180 tablet, Rfl: 1      Physical Exam:  /86 (BP Location: Left arm, Patient Position: Sitting, Cuff Size: Standard)   Pulse 71   Temp 97 6 °F (36 4 °C) (Tympanic)   Ht 5' 8" (1 727 m)   Wt 91 4 kg (201 lb 9 6 oz)   SpO2 98%   BMI 30 65 kg/m²     Physical Exam  Constitutional:       General: She is not in acute distress  Appearance: Normal appearance  HENT:      Head: Normocephalic and atraumatic  Cardiovascular:      Rate and Rhythm: Normal rate and regular rhythm  Heart sounds: No murmur heard  Pulmonary:      Effort: Pulmonary effort is normal  No respiratory distress  Breath sounds: Normal breath sounds  Musculoskeletal:         General: No deformity or signs of injury  Skin:     General: Skin is warm and dry  Neurological:      General: No focal deficit present  Mental Status: She is alert and oriented to person, place, and time     Psychiatric:         Mood and Affect: Mood normal            Labs:  Lab Results   Component Value Date    WBC 9 05 02/07/2022    HGB 14 6 02/07/2022    HCT 44 3 02/07/2022    MCV 92 02/07/2022     02/07/2022     Lab Results   Component Value Date    K 4 0 02/07/2022     (H) 02/07/2022    CO2 25 02/07/2022    BUN 14 02/07/2022    CREATININE 0 77 02/07/2022    GLUF 116 (H) 05/18/2021    CALCIUM 9 1 02/07/2022    AST 18 05/18/2021    ALT 30 05/18/2021    ALKPHOS 80 05/18/2021    EGFR 81 02/07/2022

## 2022-06-03 ENCOUNTER — TELEPHONE (OUTPATIENT)
Dept: FAMILY MEDICINE CLINIC | Facility: CLINIC | Age: 67
End: 2022-06-03

## 2022-06-03 DIAGNOSIS — I60.9 SAH (SUBARACHNOID HEMORRHAGE) (HCC): Primary | ICD-10-CM

## 2022-06-03 NOTE — PROGRESS NOTES
[facility]  [place]    Recommendation: Discussed medication adherence with patient and methods to improve adherence  Refills available on current atorvastatin prescription  Requested 90-day supply from [address]       Reason for documentation: Patient was flagged by Third Party Payer and/or internal report as being due for a refill on the following medications: atorvastatin 80 mg daily    Adherence Assessment:     Misses doses:  no  # of missed doses in the past week: 1    # of times per day patient takes meds: 1    Use of supportive adherence tools:No    Med cost concerns: no    Reason For [employer] Pharmacist    Demographics  Interaction Method: Chart Review (EMR)  Type of Intervention: New    Topic(s) Addressed  Quality measures    Intervention(s) Made      Non-Pharmacologic:    -- Adherence addressed  -- Chart update    Tool(s) Used  Payer Portal    Time Spent:   Time Spent in Direct Patient Care: 10 minutes  Time Spent in Care Coordination: 6 minutes    Recommendations  Recipient: Patient/caregiver  Outcome: Education Provided

## 2022-06-22 DIAGNOSIS — K21.9 GASTROESOPHAGEAL REFLUX DISEASE, UNSPECIFIED WHETHER ESOPHAGITIS PRESENT: ICD-10-CM

## 2022-06-22 RX ORDER — PANTOPRAZOLE SODIUM 40 MG/1
TABLET, DELAYED RELEASE ORAL
Qty: 30 TABLET | Refills: 0 | Status: SHIPPED | OUTPATIENT
Start: 2022-06-22 | End: 2022-07-26 | Stop reason: SDUPTHER

## 2022-08-15 ENCOUNTER — OFFICE VISIT (OUTPATIENT)
Dept: GASTROENTEROLOGY | Facility: CLINIC | Age: 67
End: 2022-08-15
Payer: COMMERCIAL

## 2022-08-15 VITALS
SYSTOLIC BLOOD PRESSURE: 120 MMHG | HEART RATE: 66 BPM | DIASTOLIC BLOOD PRESSURE: 70 MMHG | OXYGEN SATURATION: 97 % | BODY MASS INDEX: 30.04 KG/M2 | WEIGHT: 198.2 LBS | HEIGHT: 68 IN | TEMPERATURE: 98.7 F | RESPIRATION RATE: 17 BRPM

## 2022-08-15 DIAGNOSIS — K44.9 HIATAL HERNIA: Primary | ICD-10-CM

## 2022-08-15 DIAGNOSIS — K21.9 GASTROESOPHAGEAL REFLUX DISEASE, UNSPECIFIED WHETHER ESOPHAGITIS PRESENT: ICD-10-CM

## 2022-08-15 PROCEDURE — 1160F RVW MEDS BY RX/DR IN RCRD: CPT | Performed by: INTERNAL MEDICINE

## 2022-08-15 PROCEDURE — 99214 OFFICE O/P EST MOD 30 MIN: CPT | Performed by: INTERNAL MEDICINE

## 2022-08-15 RX ORDER — ASPIRIN 81 MG/1
81 TABLET ORAL DAILY
COMMUNITY

## 2022-08-15 RX ORDER — PANTOPRAZOLE SODIUM 40 MG/1
40 TABLET, DELAYED RELEASE ORAL DAILY
Qty: 90 TABLET | Refills: 3 | Status: SHIPPED | OUTPATIENT
Start: 2022-08-15 | End: 2022-11-13

## 2022-08-15 NOTE — PROGRESS NOTES
[facility] - Outpatient Consultation  Bonilla Turner 78 y o  female MRN: [id number]  Encounter: [id number]          ASSESSMENT AND PLAN:      1  Gastroesophageal reflux disease, unspecified whether esophagitis present  - pantoprazole (PROTONIX) 40 mg tablet; Take 1 tablet (40 mg total) by mouth daily  Dispense: 90 tablet; Refill: 3  2  Hiatal hernia    15-year-old female presenting for follow-up regarding GERD and hiatal hernia  Symptoms are very well controlled on daily Protonix, for which I have counseled her on appropriate use today as she was taking it initially after meals  Fortunately her symptoms are well controlled regardless  Recommended that she continue Protonix given how quickly her symptoms returned after stopping  At this time no indication to increase therapy  Follow-up in 1 year if not sooner if symptoms change   ______________________________________________________________________    HPI: Demond Saldana is a pleasant 77 y/o female presenting follow-up regarding GERD  She had significant symptoms of reflux and regurgitation and was last seen in September 2021  She had an endoscopy in October 2021 which demonstrated a moderate-sized hiatal hernia, no esophagitis  She has been doing very well since starting on Protonix daily  When she has had short periods of running out of the medication her symptoms return fairly quickly  Otherwise she denies any breakthrough symptoms along she takes Protonix daily  She is not currently taking it appropriately, usually taking with meals shortly after  She is up-to-date on colorectal cancer screening, negative Cologuard in 2021  I have advised her that she would be due for repeat in June 2024  REVIEW OF SYSTEMS:    CONSTITUTIONAL: Denies any fever, chills, rigors, and weight loss  HEENT: Denies odynophagia, tinnitus  CARDIOVASCULAR: No chest pain or palpitations     RESPIRATORY: Denies any cough, hemoptysis, shortness of breath or dyspnea on exertion  GASTROINTESTINAL: As noted in the History of Present Illness  GENITOURINARY: No problems with urination  Denies any hematuria or dysuria  NEUROLOGIC: No dizziness or vertigo, denies headaches  MUSCULOSKELETAL: Denies any muscle or joint pain  SKIN: Denies skin rashes or itching  ENDOCRINE:  Denies intolerance to heat or cold  PSYCHOSOCIAL: Denies depression or anxiety  Denies any recent memory loss  Historical Information   Past Medical History:   Diagnosis Date    Depression 1969    Disease of thyroid gland     Synthroid    Essential hypertension     Exertional chest pain     Fractures     - rt lower leg break, rt arm, break rt  pinky finger, ribs    Heart disease     Hypothyroidism     Stomach disorder     Unstable angina pectoris ([facility] [address] ) 2020     Past Surgical History:   Procedure Laterality Date    ANGIOPLASTY      CARDIAC CATHETERIZATION  2020    EF 75% There is a small LADD1 branch that has a severe stenosis   Given the small size of the vessel the plan will be to treat medically    TONSILLECTOMY       Social History   Social History     Substance and Sexual Activity   Alcohol Use Not Currently    Comment: None for 10 yrs, rarely historically     Social History     Substance and Sexual Activity   Drug Use Never     Social History     Tobacco Use   Smoking Status Former Smoker    Packs/day: 1 00    Years: 30 00    Pack years: 30 00    Types: Cigarettes   Smokeless Tobacco Former User    Quit date: 2009     Family History   Problem Relation Age of Onset    Thyroid disease Mother     Cancer Mother          80 yrs old lung cancer    Heart attack Father     Heart attack Brother     Thyroid disease Maternal Grandmother     Cancer Maternal Grandmother     Diabetes Maternal Grandfather     Heart attack Maternal Grandfather     Heart attack Paternal Grandfather     Heart attack Maternal Uncle        Meds/Allergies Current Outpatient Medications:     amLODIPine (NORVASC) 5 mg tablet    aspirin (ECOTRIN LOW STRENGTH) 81 mg EC tablet    atorvastatin (LIPITOR) 80 mg tablet    levothyroxine (Euthyrox) 125 mcg tablet    pantoprazole (PROTONIX) 40 mg tablet    venlafaxine (EFFEXOR) 75 mg tablet    Allergies   Allergen Reactions    Other Hives and Rash     Adhesive tape             Objective     Blood pressure 120/70, pulse 66, temperature 98 7 °F (37 1 °C), resp  rate 17, height 5' 8" (1 727 m), weight 89 9 kg (198 lb 3 2 oz), SpO2 97 %  Body mass index is 30 14 kg/m²  PHYSICAL EXAM:      General Appearance:   Alert, cooperative, no distress   HEENT:   Normocephalic, atraumatic, anicteric  Neck:  Supple, symmetrical, trachea midline   Lungs:   Clear to auscultation bilaterally; no rales, rhonchi or wheezing; respirations unlabored    Heart[de-identified]   Regular rate and rhythm; no murmur, rub, or gallop  Abdomen:   Soft, non-tender, non-distended; normal bowel sounds; no masses, no organomegaly    Genitalia:   Deferred    Rectal:   Deferred    Extremities:  No cyanosis, clubbing or edema    Pulses:  2+ and symmetric    Skin:  No jaundice, rashes, or lesions          Lab Results:   No visits with results within 1 Day(s) from this visit  Latest known visit with results is:   Appointment on 05/17/2022   Component Date Value    TSH 3RD GENERATON 05/17/2022 3 610     Free T4 05/17/2022 0 79     Cholesterol 05/17/2022 138     Triglycerides 05/17/2022 71     HDL, Direct 05/17/2022 48 (A)    LDL Calculated 05/17/2022 76     Non-HDL-Chol (CHOL-HDL) 05/17/2022 90          Radiology Results:   No results found

## 2022-09-23 DIAGNOSIS — F41.9 ANXIETY: ICD-10-CM

## 2022-09-23 DIAGNOSIS — F32.A DEPRESSIVE DISORDER: ICD-10-CM

## 2022-09-23 RX ORDER — VENLAFAXINE 75 MG/1
TABLET ORAL
Qty: 180 TABLET | Refills: 0 | Status: SHIPPED | OUTPATIENT
Start: 2022-09-23

## 2022-10-04 ENCOUNTER — OFFICE VISIT (OUTPATIENT)
Dept: OBGYN CLINIC | Facility: CLINIC | Age: 67
End: 2022-10-04
Payer: COMMERCIAL

## 2022-10-04 VITALS
SYSTOLIC BLOOD PRESSURE: 134 MMHG | WEIGHT: 197 LBS | HEIGHT: 68 IN | DIASTOLIC BLOOD PRESSURE: 84 MMHG | BODY MASS INDEX: 29.86 KG/M2 | HEART RATE: 75 BPM

## 2022-10-04 DIAGNOSIS — G89.29 CHRONIC PAIN OF RIGHT KNEE: Primary | ICD-10-CM

## 2022-10-04 DIAGNOSIS — M25.561 CHRONIC PAIN OF RIGHT KNEE: Primary | ICD-10-CM

## 2022-10-04 DIAGNOSIS — M17.11 PRIMARY OSTEOARTHRITIS OF ONE KNEE, RIGHT: ICD-10-CM

## 2022-10-04 PROCEDURE — 20610 DRAIN/INJ JOINT/BURSA W/O US: CPT | Performed by: FAMILY MEDICINE

## 2022-10-04 PROCEDURE — 99214 OFFICE O/P EST MOD 30 MIN: CPT | Performed by: FAMILY MEDICINE

## 2022-10-04 RX ORDER — LIDOCAINE HYDROCHLORIDE 10 MG/ML
4 INJECTION, SOLUTION INFILTRATION; PERINEURAL
Status: COMPLETED | OUTPATIENT
Start: 2022-10-04 | End: 2022-10-04

## 2022-10-04 RX ORDER — METHYLPREDNISOLONE ACETATE 40 MG/ML
1 INJECTION, SUSPENSION INTRA-ARTICULAR; INTRALESIONAL; INTRAMUSCULAR; SOFT TISSUE
Status: COMPLETED | OUTPATIENT
Start: 2022-10-04 | End: 2022-10-04

## 2022-10-04 RX ADMIN — LIDOCAINE HYDROCHLORIDE 4 ML: 10 INJECTION, SOLUTION INFILTRATION; PERINEURAL at 10:57

## 2022-10-04 RX ADMIN — METHYLPREDNISOLONE ACETATE 1 ML: 40 INJECTION, SUSPENSION INTRA-ARTICULAR; INTRALESIONAL; INTRAMUSCULAR; SOFT TISSUE at 10:57

## 2022-10-04 NOTE — PATIENT INSTRUCTIONS
F/u 6 wks  Begin physical therapy  R knee injection  Home exercises  Icing/heat/OTC pain meds as needed

## 2022-10-04 NOTE — PROGRESS NOTES
[facility]  [address]  [facility] [id number] [phone]      Chief Complaint:  Chief Complaint   Patient presents with    Right Knee - Pain       Vitals:  /84   Pulse 75   Ht 5' 8" (1 727 m)   Wt 89 4 kg (197 lb)   BMI 29 95 kg/m²     The following portions of the patient's history were reviewed and updated as appropriate: allergies, current medications, past family history, past medical history, past social history, past surgical history, and problem list       Subjective:   Patient ID: Tre Viera is a 78 y o  female  Here c/o R knee pain  Pain for years  Getting worse  Hurts to walk  Intermittent pain  Swells up  No locking or giving out  No pain meds      Review of Systems   Constitutional: Negative for fatigue and fever  Respiratory: Negative for shortness of breath  Cardiovascular: Negative for chest pain  Gastrointestinal: Negative for abdominal pain and nausea  Genitourinary: Negative for dysuria  Musculoskeletal: Positive for arthralgias  Skin: Negative for rash and wound  Neurological: Negative for weakness and headaches  Objective:  Right Knee Exam     Tenderness   The patient is experiencing no tenderness  Range of Motion   The patient has normal right knee ROM  Tests   Pedro Luis:  Medial - positive Lateral - positive  Varus: negative Valgus: negative    Other   Swelling: none  Effusion: no effusion present          Observations     Right Knee   Negative for effusion  Physical Exam  Constitutional:       Appearance: Normal appearance  She is normal weight  HENT:      Head: Normocephalic  Eyes:      Extraocular Movements: Extraocular movements intact  Pulmonary:      Effort: Pulmonary effort is normal    Musculoskeletal:      Cervical back: Normal range of motion  Right knee: No effusion  Instability Tests: Medial Pedro Luis test positive and lateral Pedro Luis test positive  Skin:     General: Skin is warm and dry  Neurological:      General: No focal deficit present  Mental Status: She is alert and oriented to person, place, and time  Mental status is at baseline  Psychiatric:         Mood and Affect: Mood normal          Behavior: Behavior normal          Thought Content: Thought content normal          Judgment: Judgment normal      Large joint arthrocentesis: R knee  Universal Protocol:  Consent: Verbal consent obtained  Risks and benefits: risks, benefits and alternatives were discussed  Consent given by: patient  Time out: Immediately prior to procedure a "time out" was called to verify the correct patient, procedure, equipment, support staff and site/side marked as required  Timeout called at: 10/4/2022 10:51 AM   Site marked: the operative site was marked  Supporting Documentation  Indications: pain   Procedure Details  Location: knee - R knee  Preparation: Patient was prepped and draped in the usual sterile fashion  Needle size: 25 G  Ultrasound guidance: no  Approach: anterolateral  Medications administered: 4 mL lidocaine 1 %; 1 mL methylPREDNISolone acetate 40 mg/mL    Patient tolerance: patient tolerated the procedure well with no immediate complications  Dressing:  Sterile dressing applied          I have personally reviewed pertinent films in PACS and my interpretation is XR- R knee- mod OA, no fx  Assessment/Plan:  Assessment/Plan   Diagnoses and all orders for this visit:    Chronic pain of right knee  -     XR knee 3 vw right non injury; Future  -     Ambulatory Referral to Physical Therapy; Future    Primary osteoarthritis of one knee, right  -     Ambulatory Referral to Physical Therapy; Future    Other orders  -     Large joint arthrocentesis: R knee        Return in about 6 weeks (around 11/15/2022) for Rechmarck Esposito

## 2022-10-10 ENCOUNTER — EVALUATION (OUTPATIENT)
Dept: PHYSICAL THERAPY | Facility: CLINIC | Age: 67
End: 2022-10-10
Payer: COMMERCIAL

## 2022-10-10 DIAGNOSIS — G89.29 CHRONIC PAIN OF RIGHT KNEE: Primary | ICD-10-CM

## 2022-10-10 DIAGNOSIS — M17.11 PRIMARY OSTEOARTHRITIS OF ONE KNEE, RIGHT: ICD-10-CM

## 2022-10-10 DIAGNOSIS — M25.561 CHRONIC PAIN OF RIGHT KNEE: Primary | ICD-10-CM

## 2022-10-10 PROCEDURE — 97110 THERAPEUTIC EXERCISES: CPT | Performed by: PHYSICAL THERAPIST

## 2022-10-10 PROCEDURE — 97162 PT EVAL MOD COMPLEX 30 MIN: CPT | Performed by: PHYSICAL THERAPIST

## 2022-10-10 NOTE — PROGRESS NOTES
PT Evaluation     Today's date: 10/10/2022  Patient name: Keshawn Barrios  : 1955  MRN: [id number]  Referring provider: Estrellita Cedillo MD  Dx:   Encounter Diagnosis     ICD-10-CM    1  Chronic pain of right knee  M25 561 Ambulatory Referral to Physical Therapy    G89 29     heat x 20 mins followed by ice 20 mins, gentle stretching    2  Primary osteoarthritis of one knee, right  M17 11        Start Time: 1115  Stop Time: 1200  Total time in clinic (min): 45 minutes    Assessment  Assessment details: Keshawn Barrios was seen for an initial PT evaluation today  Patient is a 78 y o  female with diagnosis of right knee pain and past medical history significant for hypothyroidism, HTN, NSTEMI, CAD, SAH, medial meniscus tear of right knee, anxiety, depression, fall, neck pain  Moderate complexity evaluation  due to number of participation restrictions, functional outcome measure of 73% limitation, and evolving clinical presentation  Findings today show limitation in right knee range of motion, strength, flexibility, and stability with pain impacting overall functional mobility including ability to walk, stand, squat  Skilled PT indicated to treat at this time to address above stated deficits and return patient to PLOF  Impairments: abnormal gait, abnormal muscle firing, abnormal muscle tone, abnormal or restricted ROM, abnormal movement, activity intolerance, impaired balance, impaired physical strength, lacks appropriate home exercise program, pain with function and safety issue  Functional limitations: walking, stairs, squatting, lunging, lifting, kneeling  Goals  STG (6 weeks)  1  Patient will have reported 0/10 pain in right knee at rest    2  Improve patient's right knee extension to -5 degrees for increased ability to take proper strides during ambulation  3  Increase patient's right single leg balance to 20 seconds for increased stability on stairs  LTG (12 weeks)  1   Patient's LE strength will be equal bilaterally for ability to ambulate and return to functional activities at [facility]  2  Patient will be able to walk with dog for 30 min with 0/10 pain in right knee  3  Patient will be independent with home exercise program for continued maintenance post PT discharge  Plan  Plan details: Progress note in 4 weeks  Patient would benefit from: skilled physical therapy  Planned modality interventions: unattended electrical stimulation, thermotherapy: hydrocollator packs and cryotherapy  Planned therapy interventions: manual therapy, neuromuscular re-education, self care, home exercise program, gait training, therapeutic exercise and therapeutic activities  Frequency: 2x week  Duration in weeks: 12  Plan of Care beginning date: 10/10/2022  Plan of Care expiration date: 1/10/2023  Treatment plan discussed with: patient and PTA        Subjective Evaluation    History of Present Illness  Date of onset: 10/10/2016  Mechanism of injury: Sugar Wood is a 78 y o  female who presents to outpatient Physical Therapy today with complaints of right knee pain  States chronic pain over the past 6 years that began after plantar fasciitis diagnosis  Also states occasional difficulty with right hip pain and right shoulder pain  Saw ortho last week who administered injection which has helped relieve some knee symptoms  MRI 2019 Impression: Complex tear of the lateral meniscus, with predominant horizontal   degenerative component and  a slight radial component of tear in the posterior   horn  Joint effusion and popliteal cyst  Focal grade 4 chondromalacia over the   posterior aspect of the lateral femoral condyle, other grade 3 changes  Marrow   edema in the posterior lateral femoral condyle  Ossific body anteriorly      Pain  Current pain ratin  At best pain ratin  At worst pain rating: 10  Location: general right knee  Quality: radiating  Relieving factors: relaxation    Social Support  Steps to enter house: yes  Stairs in house: yes   Lives in: multiple-level home  Lives with: alone    Employment status: not working (retired)  Patient Goals  Patient goal: Just bought a camper, walking [place], walk dog with leash without pain        Objective     Neurological Testing     Sensation     Knee   Left Knee   Intact: light touch    Right Knee   Intact: light touch     Active Range of Motion   Left Knee   Flexion: 135 degrees   Extension: 5 degrees     Right Knee   Flexion: 135 degrees   Extension: 0 degrees     Mobility   Patellar Mobility:   Left Knee   WFL: medial, lateral, superior and inferior       Right Knee   Hypomobile: medial, lateral, superior and inferior     Strength/Myotome Testing     Lumbar   Left   Normal strength    Right Hip   Planes of Motion   Flexion: 5  Abduction: 5  Adduction: 5    Left Knee   Normal strength  Quadriceps contraction: good    Right Knee   Flexion: 5  Extension: 4    Right Ankle/Foot   Dorsiflexion: 5    Tests     Additional Tests Details  SLB R= 9 sec L=30 sec  Hamstring 90/90 SLR (35) bilat    Swelling     Left Knee Girth Measurement (cm)   Joint line: 36 5 cm    Right Knee Girth Measurement (cm)   Joint line: 37 5 cm    General Comments:      Knee Comments  Gait: antalgic, absent TKE at HS, forward trunk lean     FOTO: 27% function, 51% predicted function                Precautions: falls  Access code: [email or web address]     Progress note: 11/10  POC: 1/10    Manuals 10/10       PROM         stretching  Hams/calf      Patellar mobs  *              Neuro Re-Ed        QS :05x10       SLB        Tandem stance        Fitter board        steamboat                        Ther Ex        Bike *       SAQ        LAQ        bridging        Leg raises SLR with QS x10               clamshell                HOIST  Knee ext  Knee flex          Leg press S=  Squat  HR        Standing HR/TR        TKE        Standing hamstring curls                                        Calf stretch Standing 3xL30       Hip flexor stretch        Hamstring stretch Seated with stool 3x:30       Ther Activity        Step ups        Sit to stand        Mini squats                        Gait Training                        Modalities                        Anuja Graft was given a handout and verbal instruction of customized home exercise program  Patient accepted program and was able to demonstrate exercises

## 2022-10-17 ENCOUNTER — OFFICE VISIT (OUTPATIENT)
Dept: PHYSICAL THERAPY | Facility: CLINIC | Age: 67
End: 2022-10-17
Payer: COMMERCIAL

## 2022-10-17 DIAGNOSIS — G89.29 CHRONIC PAIN OF RIGHT KNEE: Primary | ICD-10-CM

## 2022-10-17 DIAGNOSIS — M25.561 CHRONIC PAIN OF RIGHT KNEE: Primary | ICD-10-CM

## 2022-10-17 DIAGNOSIS — M17.11 PRIMARY OSTEOARTHRITIS OF ONE KNEE, RIGHT: ICD-10-CM

## 2022-10-17 PROCEDURE — 97530 THERAPEUTIC ACTIVITIES: CPT

## 2022-10-17 PROCEDURE — 97112 NEUROMUSCULAR REEDUCATION: CPT

## 2022-10-17 PROCEDURE — 97110 THERAPEUTIC EXERCISES: CPT

## 2022-10-17 NOTE — PROGRESS NOTES
Daily Note     Today's date: 10/17/2022  Patient name: Richi Mattson  : 1955  MRN: [id number]  Referring provider: Janki Powell MD  Dx:   Encounter Diagnosis     ICD-10-CM    1  Chronic pain of right knee  M25 561     G89 29    2  Primary osteoarthritis of one knee, right  M17 11        Start Time: 1039  Stop Time: 1123  Total time in clinic (min): 44 minutes    Subjective: Patient states she has no pain, but when she moves certain ways she has a lot of pain  Her pain is in the right hip into the knee  Her left leg also hurts at times  Yesterday she felt everything hurt including her low back and shoulders  Objective: See treatment diary below    Patient's home exercise program updated to include additional exercises  Handout issued and explained with demonstration  Patient accepts new exercises  Red therband issued for home use  Patient had questions answered  Assessment: Tolerated treatment well  Patient would benefit from continued PT for stretching and strengthening  Patient was able to add exercises to her program with little difficulty and no increase of pain  She seemed to understand all education on new exercises  Patient felt ok when she left department  Plan: Continue per plan of care  Progress treatment as tolerated         Precautions: falls  Access code: [email or web address]     Progress note: 11/10  POC: 1/10    Manuals 10/10 10/17      PROM         stretching   Hams/calf     Patellar mobs  *x10 ea              Neuro Re-Ed        QS :05x10 :05x10      SLB        Tandem stance  *:30x2 B/L      Fitter board        steamboat                        Ther Ex        Bike * L1 x5 min      SAQ        LAQ  *B/L :05x10      bridging  *:03x10      Leg raises SLR with QS x10 SLR with QS x10              clamshell  *red :05x10              HOIST  Knee ext  Knee flex          Leg press S=  Squat  HR        Standing HR/TR   *     TKE   *     Standing hamstring curls   * Calf stretch Standing 3xL30 Wedge :30x3      Hip flexor stretch        Hamstring stretch Seated with stool 3x:30 Seat w/stool B/L :30x3      Ther Activity        Step ups        Sit to stand  * high mat x10      Mini squats                        Gait Training                        Modalities                             Access Code: [email or web address]  URL: [email or web address]  Date: 10/17/2022  Prepared by: Ariel Georges    Exercises  · Supine Quad Set - 1 x daily - 7 x weekly - 3 sets - 10 reps  · Active Straight Leg Raise with Quad Set - 1 x daily - 7 x weekly - 3 sets - 10 reps  · Seated Hamstring Stretch - 3 x daily - 7 x weekly - 1 sets - 3 reps - 30 sec hold  · Gastroc Stretch on Wall - 3 x daily - 7 x weekly - 1 sets - 3 reps - 30 sec hold  · Seated Long Arc Quad - 2 x daily - 7 x weekly - 1 sets - 10 reps - 5 sec hold  · Sit to Stand - 2 x daily - 7 x weekly - 1 sets - 10 reps  · Standing Tandem Balance with Counter Support - 2 x daily - 7 x weekly - 1 sets - 2 reps - 30 sec hold  · Supine Bridge - 2 x daily - 7 x weekly - 1 sets - 10 reps - 5 sec hold  · Hooklying Clamshell with Resistance - 2 x daily - 7 x weekly - 1 sets - 10 reps - 5 sec hold

## 2022-10-24 ENCOUNTER — OFFICE VISIT (OUTPATIENT)
Dept: PHYSICAL THERAPY | Facility: CLINIC | Age: 67
End: 2022-10-24
Payer: COMMERCIAL

## 2022-10-24 DIAGNOSIS — G89.29 CHRONIC PAIN OF RIGHT KNEE: Primary | ICD-10-CM

## 2022-10-24 DIAGNOSIS — M17.11 PRIMARY OSTEOARTHRITIS OF ONE KNEE, RIGHT: ICD-10-CM

## 2022-10-24 DIAGNOSIS — M25.561 CHRONIC PAIN OF RIGHT KNEE: Primary | ICD-10-CM

## 2022-10-24 PROCEDURE — 97110 THERAPEUTIC EXERCISES: CPT

## 2022-10-24 PROCEDURE — 97530 THERAPEUTIC ACTIVITIES: CPT

## 2022-10-24 PROCEDURE — 97112 NEUROMUSCULAR REEDUCATION: CPT

## 2022-10-24 NOTE — PROGRESS NOTES
Daily Note     Today's date: 10/24/2022  Patient name: Gloria Suarez  : 1955  MRN: [id number]  Referring provider: Traci Robles MD  Dx:   Encounter Diagnosis     ICD-10-CM    1  Chronic pain of right knee  M25 561     G89 29    2  Primary osteoarthritis of one knee, right  M17 11        Start Time: 0950          Subjective: Patient states she has pain all over today  Her shoulders having been hurting all night  "It is like an electrical shock" from her scapular area down the arm  She feels her hip, knee, and shoulder pain are all connected  Her knee pain comes and goes  When it is "angery" it hurts for a couple of days  Other ties she has no pain  Presently she has no pain in the knee  Depends on her step she takes  Patient also informed PT of complaints of and they discussed treatment options  Objective: See treatment diary below    Patient's home exercises reviewed with some corrections  Her program was also updated to include additional exercises  Handout issued and explained with demonstration  Patient accepts new exercises  Assessment: Tolerated treatment well  Patient would benefit from continued PT for stretching and strengthening  Patient was able to add exercises with little difficulty and no increase of pain  The back extensions felt good to patient   This motion was encouraged to be performed several times throughout the day for proper posture  She seemed to understand all education given during session  Patient felt ok when she left department  Plan: Continue per plan of care  Progress treatment as tolerated         Precautions: falls  Access code: [email or web address]     Progress note: 11/10  POC: 1/10    Manuals 10/10 10/17 10/24     PROM         stretching        Patellar mobs  *x10 ea x10 ea             Neuro Re-Ed        QS :05x10 :05x10 :05x10     SLB        Tandem stance  *:30x2 B/L      Fitter board        steamboat                        Ther Ex        Bike * L1 x5 min L1 x7 min     SAQ   *:05x10     LAQ  *B/L :05x10      bridging  *:03x10      Leg raises SLR with QS x10 SLR with QS x10              clamshell  *red :05x10              HOIST  Knee ext  Knee flex          Leg press S=  Squat  HR        Standing HR/TR   *x10 ea     TKE   *:05x10     Standing hamstring curls   *x10                                     Calf stretch Standing 3xL30 Wedge :30x3 Wedge :30x3     Hip flexor stretch        Hamstring stretch Seated with stool 3x:30 Seat w/stool B/L :30x3 Seat w/stool B/L :30x3     Ther Activity        Step ups Fwd/Lat   *x10ea     Sit to stand  * high mat x10 Chair ecc x10     Mini squats   *x10                     Gait Training                        Modalities                             Access Code: [email or web address]  URL: [email or web address]  Date: 10/24/2022  Prepared by: Mariam Georges    Exercises  · Supine Quad Set - 1 x daily - 7 x weekly - 3 sets - 10 reps  · Active Straight Leg Raise with Quad Set - 1 x daily - 7 x weekly - 3 sets - 10 reps  · Seated Hamstring Stretch - 3 x daily - 7 x weekly - 1 sets - 3 reps - 30 sec hold  · Gastroc Stretch on Wall - 3 x daily - 7 x weekly - 1 sets - 3 reps - 30 sec hold  · Seated Long Arc Quad - 2 x daily - 7 x weekly - 1 sets - 10 reps - 5 sec hold  · Sit to Stand - 2 x daily - 7 x weekly - 1 sets - 10 reps  · Standing Tandem Balance with Counter Support - 2 x daily - 7 x weekly - 1 sets - 2 reps - 30 sec hold  · Supine Bridge - 2 x daily - 7 x weekly - 1 sets - 10 reps - 5 sec hold  · Hooklying Clamshell with Resistance - 2 x daily - 7 x weekly - 1 sets - 10 reps - 5 sec hold  · Seated Hamstring Stretch - 2-3 x daily - 7 x weekly - 1 sets - 3 reps - 30 sec hold  · Supine Hamstring Stretch - 2-3 x daily - 7 x weekly - 1 sets - 3 reps - 30 sec hold  · Standing Hamstring Stretch with Step - 2-3 x daily - 7 x weekly - 1 sets - 3 reps - 30 sec hold  · Step Up - 2 x daily - 7 x weekly - 1 sets - 10 reps  · Lateral Step Up - 2 x daily - 7 x weekly - 1 sets - 10 reps  · Heel Raises with Counter Support - 2 x daily - 7 x weekly - 1 sets - 10 reps  · Heel Toe Raises with Counter Support - 2 x daily - 7 x weekly - 1 sets - 10 reps  · Standing Terminal Knee Extension at Wall with Ball - 2 x daily - 7 x weekly - 3 sets - 10 reps  · Supine Knee Extension Strengthening - 2 x daily - 7 x weekly - 1 sets - 10 reps - 5 sec hold  · Seated Inferior Patellar Glide - 2 x daily - 7 x weekly - 1 sets - 10 reps  · Seated Medial Patellar Glide - 2 x daily - 7 x weekly - 1 sets - 10 reps  · Seated Superior Patellar Glide - 2 x daily - 7 x weekly - 1 sets - 10 reps  · Standing Back Extension - 5-6 x daily - 7 x weekly - 1 sets - 10 reps

## 2022-10-26 ENCOUNTER — OFFICE VISIT (OUTPATIENT)
Dept: PHYSICAL THERAPY | Facility: CLINIC | Age: 67
End: 2022-10-26
Payer: COMMERCIAL

## 2022-10-26 ENCOUNTER — APPOINTMENT (OUTPATIENT)
Dept: PHYSICAL THERAPY | Facility: CLINIC | Age: 67
End: 2022-10-26

## 2022-10-26 DIAGNOSIS — M54.2 CERVICALGIA: Primary | ICD-10-CM

## 2022-10-26 DIAGNOSIS — M25.511 BILATERAL SHOULDER PAIN, UNSPECIFIED CHRONICITY: ICD-10-CM

## 2022-10-26 DIAGNOSIS — M25.512 BILATERAL SHOULDER PAIN, UNSPECIFIED CHRONICITY: ICD-10-CM

## 2022-10-26 PROCEDURE — 97112 NEUROMUSCULAR REEDUCATION: CPT | Performed by: PHYSICAL THERAPIST

## 2022-10-26 PROCEDURE — 97162 PT EVAL MOD COMPLEX 30 MIN: CPT | Performed by: PHYSICAL THERAPIST

## 2022-10-26 PROCEDURE — 97110 THERAPEUTIC EXERCISES: CPT | Performed by: PHYSICAL THERAPIST

## 2022-10-26 NOTE — PROGRESS NOTES
PT Evaluation     Today's date: 10/26/2022  Patient name: Anna Alegria  : 1955  MRN: [id number]  Referring provider: Saúl Kirkpatrick MD  Dx:   Encounter Diagnosis     ICD-10-CM    1  Cervicalgia  M54 2    2  Bilateral shoulder pain, unspecified chronicity  M25 511     M25 512        Start Time: 0945  Stop Time: 1045  Total time in clinic (min): 60 minutes    Assessment  Assessment details: Anna Alegria was seen for an initial PT evaluation today  Patient is a 78 y o  female with diagnosis of cervical and bilateral shoulder pain  Past medical history significant for hypothyroidism, HTN, NSTEMI, CAD, SAH, medial meniscus tear of right knee, anxiety, depression, fall, neck pain, back pain  Moderate complexity evaluation  due to number of participation restrictions, functional outcome measure of 68% limitation, and evolving clinical presentation  Findings today show weakness of R>L shoulder, decreased cervical and shoulder range of motion, with cervical mm tightness and pain impacting overall functional mobility including ability to lift, reach, carry  Repetitive mechanical testing of cervical spine did not influence shoulder pain, unlikely cervical dysfunction is causing radicular shoulder symptoms  Skilled PT indicated to treat at this time to address above stated deficits and return patient to PLOF  Impairments: abnormal muscle firing, abnormal or restricted ROM, abnormal movement, activity intolerance, impaired physical strength, lacks appropriate home exercise program, pain with function, scapular dyskinesis, poor posture  and poor body mechanics    Goals  STG (6 weeks)  1  Patient's right shoulder flexion AROM will increase to 160 with 2/10 pain for increased ability to perform overhead ADLs  2  Patient will have 0/10 pain in right shoulder and neck at rest   3  Patient's right shoulder strength will increase to 4+/5 for increased ability to lift     4  Patient will display good seated posture with decreased forward head and retracted shoulders for 2 consecutive sessions with 50% VC  LTG (12 weeks)  1  Patient's UE AROM equal bilaterally for ability to complete hair hygiene, overhead, and behind the back ADLs  2  Patient's UE strength will be equal bilaterally for ability to lift and carry at PLOF  3  Patient will be independent with home exercise program for continued maintenance post PT discharge  Plan  Plan details: Progress note in 4 weeks  Patient would benefit from: skilled physical therapy  Planned modality interventions: unattended electrical stimulation, cryotherapy and thermotherapy: hydrocollator packs  Planned therapy interventions: neuromuscular re-education, manual therapy, therapeutic exercise, therapeutic activities, self care and home exercise program  Frequency: 2x week  Duration in weeks: 12  Plan of Care beginning date: 10/26/2022  Plan of Care expiration date: 2023  Treatment plan discussed with: patient and PTA        Subjective Evaluation    History of Present Illness  Date of onset: 2022  Mechanism of injury: Dorian Phelps is a 78 y o  female who presents to outpatient Physical Therapy today with complaints of neck and shoulder pain  Has been attending PT for right knee pain, but also has c/o bilateral hand, right hip, and low back pain  States shoulder pain began approximately 8 months ago of insidious onset, thinks it may be due to how she is sleeping            Pain  Current pain ratin  At best pain ratin  At worst pain rating: 10  Location: neck and superior shoudlers into scapula and axilla   Quality: sharp  Relieving factors: heat  Progression: worsening    Social Support  Steps to enter house: yes  Stairs in house: yes   Lives in: multiple-level home  Lives with: alone    Hand dominance: right    Patient Goals  Patient goal: be able to walk dog without pain, camp pain free (just bought camper)        Objective     Concurrent Complaints  Positive for headaches (daily) and history of trauma  Negative for dizziness, tinnitus, trouble swallowing, difficulty breathing and visual change    Neurological Testing     Sensation   Cervical/Thoracic   Left   Intact: light touch    Right   Intact: light touch    Reflexes   Left   Mayen's reflex: negative    Right   Mayen's reflex: negative    Active Range of Motion   Cervical/Thoracic Spine       Cervical    Subcranial retraction:   Restriction level: moderate  Flexion: 55 degrees   Extension: 55 degrees      Left lateral flexion: 40 degrees      Right lateral flexion: 25 degrees     with pain  Left rotation: 45 degrees  Right rotation: 45 degrees         Left Shoulder   Flexion: 125 degrees with pain  Abduction: 150 degrees with pain  External rotation BTH: T3   Internal rotation BTB: T7     Right Shoulder   Flexion: 120 degrees with pain  Abduction: 165 degrees with pain  External rotation BTH: C7 with pain  Internal rotation BTB: sacrum with pain    Strength/Myotome Testing     Left Shoulder   Normal muscle strength    Right Shoulder     Planes of Motion   Flexion: 3-   Abduction: 4-   External rotation at 0°: 4-   Internal rotation at 0°: 5     Isolated Muscles   Biceps: 5   Triceps: 5     Tests   Cervical   Negative vertical compression, alar ligament test, Sharp-Rashaun test and VBI  Left   Negative Spurling's Test B  Right   Negative Spurling's Test B  Right Shoulder   Positive empty can, full can and Hawkin's  Lumbar   Negative vertical compression  General Comments:      Shoulder Comments       FOTO: 32% function, 55% predicted function       Cervical/Thoracic Comments        Neuro Exam:     Headaches   Patient reports headaches: Yes (daily)                Precautions: none noted  Access code: [facility]   Progress note: 11/26  POC: 1/26    Manuals 10/26       stretching        SOR        Cervical traction        C/S PA and side glide mobs        T/S PA mobs  * Scapular PNF        IASTM  * c/s      Neuro Re-Ed        UBE  *      scap retraction 10x       Shoulder rolls 10x       Chin tucks 10x:03       Supine chin tuck head lift        MTP/LTP  *              Ther Ex        UT stretch 3x:30       LS stretch 3x:30       Scalene stretch        Doorway stretch        C/S AROM        Serratus punch        Prone row        Prone I, T's thumbs up and down        Standing YTI        Lat pull down        LT lift off Standing        Corner stretch :15x4               TB IR/ER  *      SL ER  *              Ther Activity                        Gait Training                        Modalities

## 2022-10-28 ENCOUNTER — OFFICE VISIT (OUTPATIENT)
Dept: CARDIOLOGY CLINIC | Facility: CLINIC | Age: 67
End: 2022-10-28

## 2022-10-28 VITALS
DIASTOLIC BLOOD PRESSURE: 94 MMHG | BODY MASS INDEX: 30.31 KG/M2 | SYSTOLIC BLOOD PRESSURE: 138 MMHG | HEIGHT: 68 IN | HEART RATE: 68 BPM | WEIGHT: 200 LBS

## 2022-10-28 DIAGNOSIS — E78.2 MIXED HYPERLIPIDEMIA: ICD-10-CM

## 2022-10-28 DIAGNOSIS — R40.0 DAYTIME SLEEPINESS: ICD-10-CM

## 2022-10-28 DIAGNOSIS — I10 ESSENTIAL HYPERTENSION: ICD-10-CM

## 2022-10-28 DIAGNOSIS — I25.10 CORONARY ARTERY DISEASE INVOLVING NATIVE CORONARY ARTERY OF NATIVE HEART WITHOUT ANGINA PECTORIS: Primary | ICD-10-CM

## 2022-10-28 NOTE — PROGRESS NOTES
Patient ID: Gene Thacker is a 78 y o  female  Plan:      Daytime sleepiness  She may have sleep apnea  She is not ready to have a test but will consider in the future if persistent  Coronary artery disease involving native coronary artery of native heart without angina pectoris  Relatively mild by cath  Mixed hyperlipidemia  Tolerating high-intensity statin therapy and will continue current regimen  Follow up Plan/Other summary comments:  Return in about 1 year (around 10/28/2023)  She will try to lose some weight  HPI: Patient seen in f/u regarding the above issues  No CP since the last visit  She has some daytime sleepiness  In January of 2020 she experience indigestion and went to [facility]  Blood pressure was quite high  There was a non ST elevation MI  Coronary angiography revealed severe stenosis of a small 1st diagonal vessel but otherwise mild diffuse disease  Medical therapy was of course recommended  Results for orders placed or performed in visit on 10/28/22   POCT ECG    Impression    NSR  WNL  Most recent or relevant cardiac/vascular testing:     None      Past Surgical History:   Procedure Laterality Date   • ANGIOPLASTY     • CARDIAC CATHETERIZATION  01/22/2020    EF 75% There is a small LADD1 branch that has a severe stenosis  Given the small size of the vessel the plan will be to treat medically   • TONSILLECTOMY       CMP:   Lab Results   Component Value Date    K 4 0 02/07/2022     (H) 02/07/2022    CO2 25 02/07/2022    BUN 14 02/07/2022    CREATININE 0 77 02/07/2022    EGFR 81 02/07/2022       Lipid Profile:   Lab Results   Component Value Date    TRIG 71 05/17/2022    HDL 48 (L) 05/17/2022         Review of Systems   10  point ROS  was otherwise non pertinent or negative except as per HPI or as below     Gait: Normal          Objective:     /94   Pulse 68   Ht 5' 8" (1 727 m)   Wt 90 7 kg (200 lb)   BMI 30 41 kg/m²     PHYSICAL EXAM:    General:  Normal appearance in no distress  Eyes:  Anicteric  Oral mucosa:  Moist   Neck:  No JVD  Carotid upstrokes are brisk without bruits  No masses  Chest:  Clear to auscultation  Cardiac:  No palpable PMI  Normal S1 and S2  No murmur gallop or rub  Abdomen:  Soft and nontender  No palpable organomegaly or aortic enlargement  Extremities:  No peripheral edema  Musculoskeletal:  Symmetric  Vascular:  Femoral pulses are brisk without bruits  Popliteal pulses are intact bilaterally  Pedal pulses are intact  Neuro:  Grossly symmetric  Psych:  Alert and oriented x3          Current Outpatient Medications:   •  amLODIPine (NORVASC) 5 mg tablet, Take 1 tablet by mouth once daily, Disp: 90 tablet, Rfl: 1  •  aspirin (ECOTRIN LOW STRENGTH) 81 mg EC tablet, Take 81 mg by mouth daily, Disp: , Rfl:   •  atorvastatin (LIPITOR) 80 mg tablet, Take 1 tablet (80 mg total) by mouth daily at bedtime, Disp: 15 tablet, Rfl: 2  •  levothyroxine 125 mcg tablet, Take 1 tablet by mouth once daily, Disp: 90 tablet, Rfl: 1  •  pantoprazole (PROTONIX) 40 mg tablet, Take 1 tablet (40 mg total) by mouth daily, Disp: 90 tablet, Rfl: 3  •  venlafaxine (EFFEXOR) 75 mg tablet, Take 1 tablet by mouth twice daily, Disp: 180 tablet, Rfl: 0  Allergies   Allergen Reactions   • Other Hives and Rash     Adhesive tape       Past Medical History:   Diagnosis Date   • Depression 1969   • Disease of thyroid gland     Synthroid   • Essential hypertension    • Exertional chest pain    • Fractures     - rt lower leg break, rt arm, break rt  pinky finger, ribs   • Heart disease    • Hypothyroidism    • Stomach disorder    • Unstable angina pectoris ([email or web address] ) 01/28/2020           Social History     Tobacco Use   Smoking Status Former Smoker   • Packs/day: 1 00   • Years: 30 00   • Pack years: 30 00   • Types: Cigarettes   Smokeless Tobacco Former User   • Quit date: 2/5/2009

## 2022-10-28 NOTE — ASSESSMENT & PLAN NOTE
She may have sleep apnea  She is not ready to have a test but will consider in the future if persistent

## 2022-11-02 ENCOUNTER — OFFICE VISIT (OUTPATIENT)
Dept: PHYSICAL THERAPY | Facility: CLINIC | Age: 67
End: 2022-11-02

## 2022-11-02 DIAGNOSIS — M25.511 BILATERAL SHOULDER PAIN, UNSPECIFIED CHRONICITY: ICD-10-CM

## 2022-11-02 DIAGNOSIS — M54.2 CERVICALGIA: Primary | ICD-10-CM

## 2022-11-02 DIAGNOSIS — M25.512 BILATERAL SHOULDER PAIN, UNSPECIFIED CHRONICITY: ICD-10-CM

## 2022-11-02 NOTE — PROGRESS NOTES
Daily Note     Today's date: 2022  Patient name: Mariangel Suazo  : 1955  MRN: [id number]  Referring provider: Arlander Jeans, MD  Dx:   Encounter Diagnosis     ICD-10-CM    1  Cervicalgia  M54 2    2  Bilateral shoulder pain, unspecified chronicity  M25 511     M25 512        Start Time: 1030  Stop Time: 1130  Total time in clinic (min): 60 minutes    Subjective: Patient states she is feeling a little better  No neck pain currently, but will continue to get HA radiating from posterior neck into head until she changes positions  3/10 shoulder pain located at anterior right shoulder today  Does have tingling at scapular region today  Objective: See treatment diary below      Assessment: Tolerated treatment well  Suboccipital tightness noted today with weakness of deep cervical flexors and overuse of cervical accessory mm  Over activation of UT with scapular exercise today  Reviewed findings with patient today and was given updated home program  Patient would benefit from continued PT      Plan: Continue per plan of care  Progress treatment as tolerated  Patient states she will f/u with doctor and be going OOT, not making a f/u session at this time, but would like to be put on hold        Precautions: none noted  Access code: [facility]   Progress note:   POC:     Manuals 10/26 11/2      stretching        SOR  3x1'      Cervical traction  3x1'      C/S PA and side glide mobs        T/S PA mobs  PA grade III-IV      Scapular PNF        IASTM  [place] to C/S in sitting using GT 3,4,5      Neuro Re-Ed        UBE  5 min L1      scap retraction 10x 10x      Shoulder rolls 10x 10x      Chin tucks 10x:03 reviewed      Supine chin tuck head lift  :05x10      MTP/LTP  Red 10x              Ther Ex        UT stretch 3x:30 reviewed      LS stretch 3x:30 reviewed      Scalene stretch        Doorway stretch        C/S AROM        Serratus punch        Prone row        Prone I, T's thumbs up and down Prone I's 10x      Standing YTI        Lat pull down        LT lift off Standing        Corner stretch :15x4 reviewed              TB IR/ER  Red 10x ea      SL ER  10x              Ther Activity                        Gait Training                        Modalities

## 2022-11-15 ENCOUNTER — OFFICE VISIT (OUTPATIENT)
Dept: OBGYN CLINIC | Facility: CLINIC | Age: 67
End: 2022-11-15

## 2022-11-15 VITALS
HEART RATE: 75 BPM | SYSTOLIC BLOOD PRESSURE: 153 MMHG | BODY MASS INDEX: 29.7 KG/M2 | DIASTOLIC BLOOD PRESSURE: 92 MMHG | HEIGHT: 68 IN | WEIGHT: 196 LBS

## 2022-11-15 DIAGNOSIS — G89.29 CHRONIC PAIN OF RIGHT KNEE: ICD-10-CM

## 2022-11-15 DIAGNOSIS — M19.041 OSTEOARTHRITIS OF RIGHT INDEX FINGER: Primary | ICD-10-CM

## 2022-11-15 DIAGNOSIS — M79.644 PAIN IN FINGER OF RIGHT HAND: ICD-10-CM

## 2022-11-15 DIAGNOSIS — M25.561 CHRONIC PAIN OF RIGHT KNEE: ICD-10-CM

## 2022-11-15 RX ORDER — METHYLPREDNISOLONE ACETATE 40 MG/ML
0.5 INJECTION, SUSPENSION INTRA-ARTICULAR; INTRALESIONAL; INTRAMUSCULAR; SOFT TISSUE
Status: COMPLETED | OUTPATIENT
Start: 2022-11-15 | End: 2022-11-15

## 2022-11-15 RX ORDER — LIDOCAINE HYDROCHLORIDE 10 MG/ML
0.5 INJECTION, SOLUTION INFILTRATION; PERINEURAL
Status: COMPLETED | OUTPATIENT
Start: 2022-11-15 | End: 2022-11-15

## 2022-11-15 RX ADMIN — METHYLPREDNISOLONE ACETATE 0.5 ML: 40 INJECTION, SUSPENSION INTRA-ARTICULAR; INTRALESIONAL; INTRAMUSCULAR; SOFT TISSUE at 10:57

## 2022-11-15 RX ADMIN — LIDOCAINE HYDROCHLORIDE 0.5 ML: 10 INJECTION, SOLUTION INFILTRATION; PERINEURAL at 10:57

## 2022-11-15 NOTE — PATIENT INSTRUCTIONS
F/u as needed  R 2nd PIP joint injection given  Icing/heat/OTC pain meds as needed    Failed R knee steroid injection- consider visco injection

## 2022-11-15 NOTE — PROGRESS NOTES
[address] [facility]  [address]  [facility] [address] [phone]      Chief Complaint:  Chief Complaint   Patient presents with   • Right Knee - Follow-up       Vitals:  /92   Pulse 75   Ht 5' 8" (1 727 m)   Wt 88 9 kg (196 lb)   BMI 29 80 kg/m²     The following portions of the patient's history were reviewed and updated as appropriate: allergies, current medications, past family history, past medical history, past social history, past surgical history, and problem list       Subjective:   Patient ID: Leroy Noonan is a 78 y o  female  Here for f/u  R knee pain  No pain currently  Pain with flexion of knee laying bed  Injection didn't help  Hurts to walk  Intermittent pain  Swells up  Finished PT  Doing home exercises  Also having R 2nd finger  PIP joint hurts  No pain currently  Pain is intermittent  Pain with movement      Review of Systems   Constitutional: Negative for fatigue and fever  Respiratory: Negative for shortness of breath  Cardiovascular: Negative for chest pain  Gastrointestinal: Negative for abdominal pain and nausea  Genitourinary: Negative for dysuria  Musculoskeletal: Positive for arthralgias  Skin: Negative for rash and wound  Neurological: Negative for weakness and headaches  Objective:  Right Knee Exam     Tenderness   The patient is experiencing no tenderness  Range of Motion   The patient has normal right knee ROM  Physical Exam  Constitutional:       Appearance: Normal appearance  She is normal weight  HENT:      Head: Normocephalic  Eyes:      Extraocular Movements: Extraocular movements intact  Pulmonary:      Effort: Pulmonary effort is normal    Musculoskeletal:      Cervical back: Normal range of motion  Comments: R 2nd finger PIP joint TTP   Skin:     General: Skin is warm and dry  Neurological:      General: No focal deficit present        Mental Status: She is alert and oriented to person, place, and time  Mental status is at baseline  Psychiatric:         Mood and Affect: Mood normal          Behavior: Behavior normal          Thought Content: Thought content normal          Judgment: Judgment normal      Small joint arthrocentesis: R index PIP  Universal Protocol:  Consent: Verbal consent obtained  Risks and benefits: risks, benefits and alternatives were discussed  Consent given by: patient  Timeout called at: 11/15/2022 10:56 AM   Supporting Documentation  Indications: pain   Procedure Details  Location: index finger - R index PIP  Needle size: 25 G  Ultrasound guidance: no  Approach: dorsal  Medications administered: 0 5 mL lidocaine 1 %; 0 5 mL methylPREDNISolone acetate 40 mg/mL    Patient tolerance: patient tolerated the procedure well with no immediate complications  Dressing:  Sterile dressing applied            I have personally reviewed pertinent films in PACS and my interpretation is XR-  R hand- mild OA  Assessment/Plan:  Assessment/Plan   Diagnoses and all orders for this visit:    Osteoarthritis of right index finger  -     Small joint arthrocentesis: R index PIP    Pain in finger of right hand  -     XR hand 3+ vw right; Future  -     Small joint arthrocentesis: R index PIP    Chronic pain of right knee        Return if symptoms worsen or fail to improve       Shannan Rosario MD

## 2023-01-10 ENCOUNTER — OFFICE VISIT (OUTPATIENT)
Dept: FAMILY MEDICINE CLINIC | Facility: CLINIC | Age: 68
End: 2023-01-10

## 2023-01-10 VITALS
TEMPERATURE: 98.3 F | DIASTOLIC BLOOD PRESSURE: 86 MMHG | HEIGHT: 68 IN | OXYGEN SATURATION: 96 % | WEIGHT: 197 LBS | BODY MASS INDEX: 29.86 KG/M2 | SYSTOLIC BLOOD PRESSURE: 154 MMHG | HEART RATE: 72 BPM

## 2023-01-10 DIAGNOSIS — E78.2 MIXED HYPERLIPIDEMIA: ICD-10-CM

## 2023-01-10 DIAGNOSIS — I10 ESSENTIAL HYPERTENSION: Primary | ICD-10-CM

## 2023-01-10 DIAGNOSIS — Z86.16 PERSONAL HISTORY OF COVID-19: ICD-10-CM

## 2023-01-10 DIAGNOSIS — E03.9 HYPOTHYROIDISM, UNSPECIFIED TYPE: ICD-10-CM

## 2023-01-10 DIAGNOSIS — Z87.898 HISTORY OF SEIZURE: ICD-10-CM

## 2023-01-10 DIAGNOSIS — Z86.79 HISTORY OF SUBARACHNOID HEMORRHAGE: ICD-10-CM

## 2023-01-10 DIAGNOSIS — I25.2 HISTORY OF NON-ST ELEVATION MYOCARDIAL INFARCTION (NSTEMI): ICD-10-CM

## 2023-01-10 DIAGNOSIS — Z23 IMMUNIZATION DUE: ICD-10-CM

## 2023-01-10 DIAGNOSIS — R23.2 HOT FLASHES: ICD-10-CM
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
## 2023-01-10 PROBLEM — I60.9 SAH (SUBARACHNOID HEMORRHAGE) (HCC): Status: RESOLVED | Noted: 2022-02-06 | Resolved: 2023-01-10

## 2023-01-10 PROBLEM — S83.241A ACUTE MEDIAL MENISCUS TEAR OF RIGHT KNEE: Status: RESOLVED | Noted: 2019-07-03 | Resolved: 2023-01-10

## 2023-01-10 PROBLEM — I21.4 NSTEMI, INITIAL EPISODE OF CARE (HCC): Status: RESOLVED | Noted: 2020-01-23 | Resolved: 2023-01-10

## 2023-01-10 NOTE — PROGRESS NOTES
Name: Guzman Laura      : 1955      MRN: [id number]  Encounter Provider: John David DO  Encounter Date: 1/10/2023   Encounter department: [address] [place] [address]  Essential hypertension  Comments:  elevated today and at ortho visit in October- had been controlled - pt will start checking at home 5-7 days a week  Orders:  -     Comprehensive metabolic panel; Future    2  Hypothyroidism, unspecified type  -     TSH, 3rd generation; Future  -     T4, free; Future    3  Hot flashes  -     TSH, 3rd generation; Future  -     T4, free; Future    4  History of subarachnoid hemorrhage  Comments:  f/u imaging 02/15 showed resolution- had outpt f/u with neurosurg via telemed visit 2022 & repeat CT was ordered to "Complete 2 weeks after restart ASA" - pt did not obtain- I reminded pt of these instructions/orders from neurosurgeon  Pt has been asymptomatic    5  History of seizure  Comments:  post traumatic head injury with intracranial bleed 2022, no further seizures    6  History of non-ST elevation myocardial infarction (NSTEMI)  Comments:  managed by cardiology    7  Personal history of COVID-19  Comments:  reports had positive home test and mild symptoms 2022- fully recovered    8  BMI 29 0-29 9,adult    9  Immunization due  -     influenza vaccine, high-dose, PF 0 7 mL (FLUZONE HIGH-DOSE)    10  Mixed hyperlipidemia  -     Lipid panel; Future  -     Comprehensive metabolic panel; Future        BMI Counseling: Body mass index is 29 95 kg/m²  The BMI is above normal  Nutrition recommendations include reducing portion sizes  Exercise recommendations include exercising 3-5 times per week        Subjective      Chief Complaint   Patient presents with   • Follow-up     6 month follow up       Here for scheduled f/u visit - this is the first time I am seeing this pt-  She follows with other provider here who is on leave  Chart review shows hx traumatic SAH 02/2022, no surgical intervention- repeat CT in hospital showed stability and then f/u imaging 02/15 showed resolution- had outpt f/u with neurosurg via telemed visit 02/22/2022 and repeat CT was ordered to "Complete 2 weeks after restart ASA ----SAH on recent imagining 2/15/2022 is resolved but patient will restart ASA , is prescribed by cardiologist"    - pt did not obtain that repeat CT or have any further neurosurg f/u  bp high today- similarly high at ortho appt in November per chart review- pt has not checked at   home "in quite awhile" - takes 5mg norvasc  Sees cardiol yearly- last was October   Shows skin nicks and small bruises that she states is from being on ASA  C/o "started menopause 18yrs ago and I'm still getting hot flashes, was wondering if it's my thyroid"- last thyroid labs ok in May per chart review  "don't know if it's from hitting my head or my age- lose words" - does crossword puzzles daily      2/22/2022  Jose  ([address])  February 22, 2022    1:36 PM  Tere oBrges attempted to contact Lucille President (Call Complete)    Tere Borges     1:42 PM  Note  Called pt to schedule 2 week F/U with CT head head prior  Patient stated she had to come out of pocket $250 00 for the CT and did not want to schedule study  Tried to at least have patient schedule follow up appointment, she declined as well  Per Amie Christianson notes follow up visit is to ensure the start up of her aspirin today has not caused any bleeding  After explaining this to patient she still refused  Patient understand to call the office is she has any symptoms or report to ED  Review of Systems   HENT: Negative for nosebleeds  Gastrointestinal: Negative for blood in stool  Genitourinary: Negative for hematuria  Neurological: Negative          Current Outpatient Medications on File Prior to Visit   Medication Sig   • amLODIPine (NORVASC) 5 mg tablet Take 1 tablet by mouth once daily   • aspirin (ECOTRIN LOW STRENGTH) 81 mg EC tablet Take 81 mg by mouth daily   • atorvastatin (LIPITOR) 80 mg tablet Take 1 tablet (80 mg total) by mouth daily at bedtime   • levothyroxine 125 mcg tablet Take 1 tablet by mouth once daily   • venlafaxine (EFFEXOR) 75 mg tablet Take 1 tablet by mouth twice daily   • pantoprazole (PROTONIX) 40 mg tablet Take 1 tablet (40 mg total) by mouth daily       Objective     /86 (BP Location: Left arm, Patient Position: Sitting, Cuff Size: Standard)   Pulse 72   Temp 98 3 °F (36 8 °C) (Tympanic)   Ht 5' 8" (1 727 m)   Wt 89 4 kg (197 lb)   SpO2 96%   BMI 29 95 kg/m²     Physical Exam  Vitals and nursing note reviewed  Constitutional:       General: She is not in acute distress  Appearance: She is well-developed  She is not ill-appearing, toxic-appearing or diaphoretic  HENT:      Head: Normocephalic and atraumatic  Mouth/Throat:      Pharynx: Uvula midline  Neck:      Trachea: Phonation normal    Cardiovascular:      Rate and Rhythm: Normal rate and regular rhythm  Heart sounds: S1 normal and S2 normal      No friction rub  No gallop  Pulmonary:      Effort: Pulmonary effort is normal       Breath sounds: Normal breath sounds and air entry  Abdominal:      Palpations: Abdomen is soft  There is no hepatomegaly or splenomegaly  Tenderness: There is no abdominal tenderness  Musculoskeletal:      Right lower leg: No edema  Left lower leg: No edema  Skin:     Coloration: Skin is not pale  Neurological:      Mental Status: She is alert and oriented to person, place, and time  Gait: Gait normal    Psychiatric:         Mood and Affect: Mood normal          Behavior: Behavior normal  Behavior is cooperative         Kamryn Yoon DO

## 2023-02-24 ENCOUNTER — OFFICE VISIT (OUTPATIENT)
Dept: PODIATRY | Facility: CLINIC | Age: 68
End: 2023-02-24

## 2023-02-24 VITALS
SYSTOLIC BLOOD PRESSURE: 174 MMHG | HEART RATE: 80 BPM | OXYGEN SATURATION: 95 % | DIASTOLIC BLOOD PRESSURE: 97 MMHG | WEIGHT: 202 LBS | HEIGHT: 68 IN | BODY MASS INDEX: 30.62 KG/M2

## 2023-02-24 DIAGNOSIS — M79.675 GREAT TOE PAIN, LEFT: Primary | ICD-10-CM

## 2023-02-24 DIAGNOSIS — L60.0 INGROWN LEFT BIG TOENAIL: ICD-10-CM

## 2023-02-24 RX ORDER — LIDOCAINE HYDROCHLORIDE 10 MG/ML
3 INJECTION, SOLUTION INFILTRATION; PERINEURAL ONCE
Status: COMPLETED | OUTPATIENT
Start: 2023-02-24 | End: 2023-02-24

## 2023-02-24 RX ADMIN — LIDOCAINE HYDROCHLORIDE 3 ML: 10 INJECTION, SOLUTION INFILTRATION; PERINEURAL at 09:51

## 2023-02-24 NOTE — PROGRESS NOTES
Assessment/Plan:    No problem-specific Assessment & Plan notes found for this encounter  Diagnoses and all orders for this visit:    Great toe pain, left  -     lidocaine (XYLOCAINE) 1 % injection 3 mL    Ingrown left big toenail  -     lidocaine (XYLOCAINE) 1 % injection 3 mL        Plan:     - Patient presents with ingrown toenail on the (medial) border of (left) hallux  Partial toenail avulsion procedure was performed as detailed below     - The procedure, anesthesia, complications and alternative care were explained in great detail  No warranties or guarantees were given as to the outcome of the procedure  Verbal consent was obtained from the patient  3cc of 1% lidocaine plain was injected in to the left hallux following sterile alcohol prep  The toe was prepped in sterile fashion  A tourniquet was applied to the hallux for hemostasis  Under sterile conditions the (partial/total) nail plate was removed  The tourniquet was removed after verifying all the pathologic nail tissue was removed  A dry sterile dressing with antibiotic ointment was applied to the surgical site  Home care instructions were given to the patient including decreased activity, ice and OTC pain medication as needed for 3 days  Patient will also perform daily dressing changes until the surgical site is fully healed  Patient tolerated the procedure well and with no complications  - Monitor for infection: pus (thick yellow drainage), redness tracking up the foot, fever, nausea, vomiting, fever, chills  If any of these issues arise, call clinic immediately or go to urgent care or emergency room to see provider     - The patient will return in 10 days for follow-up  Subjective:      Patient ID: Abril Gill is a 80 y o  female  28-year-old female with past medical history as below presents for an evaluation of left toe pain secondary to ingrown toenail deformity    Patient reports she started noticing discomfort and erythema in shoes and socks  She did not notice any drainage at this time  Denies any other complaints  The following portions of the patient's history were reviewed and updated as appropriate:   She  has a past medical history of Acute medial meniscus tear of right knee (7/3/2019), Depression (1969), Disease of thyroid gland, Essential hypertension, Exertional chest pain, Fall from slipping on ice (2/6/2022), Fractures, Heart disease, Hypothyroidism, NSTEMI, initial episode of care [facility]) (1/23/2020), [facility] ) (2/6/2022), Stomach disorder, and Unstable angina pectoris ([facility] ) (01/28/2020)  She   Patient Active Problem List    Diagnosis Date Noted   • Personal history of COVID-19 01/10/2023   • History of non-ST elevation myocardial infarction (NSTEMI) 01/10/2023   • History of subarachnoid hemorrhage 01/10/2023   • History of seizure 01/10/2023   • Hot flashes 01/10/2023   • Daytime sleepiness 10/28/2022   • Neck pain, acute 02/22/2022   • Coronary artery disease involving native coronary artery of native heart without angina pectoris 08/03/2020   • BMI 29 0-29 9,adult 06/09/2020   • Essential hypertension 01/23/2020   • Reflux involving intestinal tract 01/23/2020   • Mixed hyperlipidemia 01/23/2020   • Internal derangement of right knee 07/03/2019   • Anxiety 09/28/2016   • Depressive disorder 09/28/2016   • Contact dermatitis and other eczema, due to unspecified cause 04/11/2012   • Hypothyroidism 05/26/2010     She  has a past surgical history that includes Tonsillectomy; Angioplasty; and Cardiac catheterization (01/22/2020)       Review of Systems   All other systems reviewed and are negative  Objective:      BP (!) 174/97   Pulse 80   Ht 5' 8" (1 727 m)   Wt 91 6 kg (202 lb)   SpO2 95%   BMI 30 71 kg/m²          Physical Exam  Vitals reviewed  Cardiovascular:      Rate and Rhythm: Normal rate  Pulses: Normal pulses             Dorsalis pedis pulses are 2+ on the left side  Posterior tibial pulses are 2+ on the left side  Pulmonary:      Effort: Pulmonary effort is normal    Musculoskeletal:         General: Tenderness present  Feet:      Left foot:      Toenail Condition: Left toenails are ingrown  Comments: Left hallux toenail ingrown medial nail border  Nail plate appears to be incurvated and nail margin  Proximal and medial nail border erythema present  No active drainage  Skin:     General: Skin is warm  Neurological:      General: No focal deficit present  Mental Status: She is alert  Psychiatric:         Mood and Affect: Mood normal        Nail removal    Date/Time: 2/24/2023 9:43 AM  Performed by: Guevara Villatoro DPM  Authorized by: Guevara Villatoro DPM     Patient location:  ClinicUniversal Protocol:  Consent: Verbal consent obtained  Risks and benefits: risks, benefits and alternatives were discussed  Consent given by: patient  Patient understanding: patient states understanding of the procedure being performed  Patient identity confirmed: verbally with patient      Location:     Foot:  L big toe  Pre-procedure details:     Skin preparation:  Alcohol and Betadine  Anesthesia (see MAR for exact dosages): Anesthesia method:  Local infiltration    Local anesthetic:  Lidocaine 1% w/o epi (3cc)  Nail Removal:     Nail removed:  Partial    Nail side:  Medial  Post-procedure details:     Dressing:  4x4 sterile gauze, antibiotic ointment and gauze roll    Patient tolerance of procedure:   Tolerated well, no immediate complications Ino Dawn), Medicine  [address] [place] [address]  Phone: [phone]  Fax: [phone]    Ary Dover), Medicine  [address] [place] [address]  Phone: [phone]  Fax: [phone]

## 2023-03-10 ENCOUNTER — OFFICE VISIT (OUTPATIENT)
Dept: PODIATRY | Facility: CLINIC | Age: 68
End: 2023-03-10

## 2023-03-10 VITALS
SYSTOLIC BLOOD PRESSURE: 140 MMHG | BODY MASS INDEX: 30.62 KG/M2 | WEIGHT: 202 LBS | HEART RATE: 78 BPM | DIASTOLIC BLOOD PRESSURE: 79 MMHG | HEIGHT: 68 IN

## 2023-03-10 DIAGNOSIS — B35.1 TINEA UNGUIUM: Primary | ICD-10-CM

## 2023-03-11 NOTE — PROGRESS NOTES
Assessment/Plan:    No problem-specific Assessment & Plan notes found for this encounter  Diagnoses and all orders for this visit:    Tinea unguium  -     ciclopirox (PENLAC) 8 % solution; Apply topically daily at bedtime      Plan:     - diagnosis and treatments were discussed with patient  I offered patient various treatment options including topical OTC and prescription anti-fungal topicals and oral anti-fungal medications  I also explained patient risks and benefits of each treatment  Patient opted for topical anti-fungal  Rx Panlac solution and went over the application protocol  Patient agrees with plan and understands  All questions and concerns were addressed  - return in 6 months   - Call if any questions       Subjective:      Patient ID: Alex Vega is a 80 y o  female  81 yo female presents for an evaluation of left toenail partial nail avulsion  Reports no pain to the toenail  She is concerned about discoloration to the toenail and is interested in treatment options  The following portions of the patient's history were reviewed and updated as appropriate:   She  has a past medical history of Acute medial meniscus tear of right knee (7/3/2019), Depression (1969), Disease of thyroid gland, Essential hypertension, Exertional chest pain, Fall from slipping on ice (2/6/2022), Fractures, Heart disease, Hypothyroidism, NSTEMI, initial episode of care [facility]) (1/23/2020), SAH (subarachnoid hemorrhage) ([facility] ) (2/6/2022), Stomach disorder, and Unstable angina pectoris ([facility] ) (01/28/2020)    She   Patient Active Problem List    Diagnosis Date Noted   • Personal history of COVID-19 01/10/2023   • History of non-ST elevation myocardial infarction (NSTEMI) 01/10/2023   • History of subarachnoid hemorrhage 01/10/2023   • History of seizure 01/10/2023   • Hot flashes 01/10/2023   • Daytime sleepiness 10/28/2022   • Neck pain, acute 02/22/2022   • Coronary artery disease involving native coronary artery of native heart without angina pectoris 08/03/2020   • BMI 29 0-29 9,adult 06/09/2020   • Essential hypertension 01/23/2020   • Reflux involving intestinal tract 01/23/2020   • Mixed hyperlipidemia 01/23/2020   • Internal derangement of right knee 07/03/2019   • Anxiety 09/28/2016   • Depressive disorder 09/28/2016   • Contact dermatitis and other eczema, due to unspecified cause 04/11/2012   • Hypothyroidism 05/26/2010     She  has a past surgical history that includes Tonsillectomy; Angioplasty; and Cardiac catheterization (01/22/2020)       Review of Systems   All other systems reviewed and are negative  Objective:      /79 (BP Location: Left arm, Patient Position: Sitting, Cuff Size: Large)   Pulse 78   Ht 5' 8" (1 727 m)   Wt 91 6 kg (202 lb)   BMI 30 71 kg/m²          Physical Exam  Vitals reviewed  Feet:      Left foot:      Toenail Condition: Fungal disease present  Comments: Left hallux toenail yellowish discoloration consistent with tinea unguium  Partial nail avulsion site healed  No pain

## 2023-03-21 DIAGNOSIS — F32.A DEPRESSIVE DISORDER: ICD-10-CM

## 2023-03-21 DIAGNOSIS — I25.2 HISTORY OF NON-ST ELEVATION MYOCARDIAL INFARCTION (NSTEMI): Primary | ICD-10-CM

## 2023-03-21 DIAGNOSIS — K21.9 GASTROESOPHAGEAL REFLUX DISEASE, UNSPECIFIED WHETHER ESOPHAGITIS PRESENT: ICD-10-CM

## 2023-03-21 DIAGNOSIS — F41.9 ANXIETY: ICD-10-CM

## 2023-03-21 DIAGNOSIS — E03.9 HYPOTHYROIDISM, UNSPECIFIED TYPE: ICD-10-CM

## 2023-03-21 DIAGNOSIS — B35.1 TINEA UNGUIUM: ICD-10-CM

## 2023-03-21 DIAGNOSIS — I10 ESSENTIAL HYPERTENSION: ICD-10-CM

## 2023-03-21 DIAGNOSIS — E78.2 MIXED HYPERLIPIDEMIA: ICD-10-CM

## 2023-03-21 RX ORDER — ASPIRIN 81 MG/1
81 TABLET ORAL DAILY
Qty: 90 TABLET | Refills: 1 | Status: SHIPPED | OUTPATIENT
Start: 2023-03-21

## 2023-03-21 RX ORDER — PANTOPRAZOLE SODIUM 40 MG/1
40 TABLET, DELAYED RELEASE ORAL DAILY
Qty: 90 TABLET | Refills: 3 | Status: SHIPPED | OUTPATIENT
Start: 2023-03-21 | End: 2023-06-19

## 2023-03-21 RX ORDER — LEVOTHYROXINE SODIUM 0.12 MG/1
125 TABLET ORAL DAILY
Qty: 90 TABLET | Refills: 1 | Status: SHIPPED | OUTPATIENT
Start: 2023-03-21

## 2023-03-21 RX ORDER — AMLODIPINE BESYLATE 5 MG/1
5 TABLET ORAL DAILY
Qty: 90 TABLET | Refills: 1 | Status: SHIPPED | OUTPATIENT
Start: 2023-03-21

## 2023-03-21 RX ORDER — ATORVASTATIN CALCIUM 80 MG/1
80 TABLET, FILM COATED ORAL
Qty: 15 TABLET | Refills: 2 | Status: SHIPPED | OUTPATIENT
Start: 2023-03-21

## 2023-03-21 RX ORDER — VENLAFAXINE 75 MG/1
75 TABLET ORAL 2 TIMES DAILY
Qty: 180 TABLET | Refills: 1 | Status: SHIPPED | OUTPATIENT
Start: 2023-03-21

## 2023-03-21 NOTE — TELEPHONE ENCOUNTER
Pt came to the office to request all of medication to [facility]  All medications to a 90 day supply     Amlodipine 5mg one tab by mouth once daily #90    Aspiring low strength 81 mg EC tab one daily     Atovastatin 80mgs one at bedtime #90    Ciclopirox 8% daily at bedtime  6 6ml     Levothyroxine 125mcg tab one daily #90    Pantoprazole 40mg tab one daily #90    Effexor 75mg tab twice daily #180    To [facility]

## 2023-04-27 DIAGNOSIS — E78.2 MIXED HYPERLIPIDEMIA: ICD-10-CM

## 2023-04-27 RX ORDER — ATORVASTATIN CALCIUM 80 MG/1
80 TABLET, FILM COATED ORAL
Qty: 90 TABLET | Refills: 1 | Status: SHIPPED | OUTPATIENT
Start: 2023-04-27

## 2023-05-15 ENCOUNTER — TELEPHONE (OUTPATIENT)
Dept: NEUROSURGERY | Facility: CLINIC | Age: 68
End: 2023-05-15

## 2023-05-15 NOTE — TELEPHONE ENCOUNTER
Sent letter home reminding her of a follow up appointment and ct that needs to be completed  Telephone note from 2022 states she did npt want top follow up and pay 250 00 out of pocket for imaging she was asked to reschedule office visit she declined letter sent over due results marked as done

## 2023-06-15 ENCOUNTER — VBI (OUTPATIENT)
Dept: ADMINISTRATIVE | Facility: OTHER | Age: 68
End: 2023-06-15

## 2023-06-16 ENCOUNTER — TELEPHONE (OUTPATIENT)
Dept: FAMILY MEDICINE CLINIC | Facility: CLINIC | Age: 68
End: 2023-06-16

## 2023-06-23 ENCOUNTER — TELEPHONE (OUTPATIENT)
Dept: FAMILY MEDICINE CLINIC | Facility: CLINIC | Age: 68
End: 2023-06-23

## 2023-08-02 ENCOUNTER — RA CDI HCC (OUTPATIENT)
Dept: OTHER | Facility: HOSPITAL | Age: 68
End: 2023-08-02

## 2023-08-02 NOTE — PROGRESS NOTES
[address] [facility] coding opportunities       Chart reviewed, no opportunity found: [address]        Patients Insurance     Medicare Insurance: [employer]

## 2023-08-11 ENCOUNTER — OFFICE VISIT (OUTPATIENT)
Dept: FAMILY MEDICINE CLINIC | Facility: CLINIC | Age: 68
End: 2023-08-11
Payer: COMMERCIAL

## 2023-08-11 VITALS
TEMPERATURE: 97.9 F | HEIGHT: 68 IN | OXYGEN SATURATION: 95 % | DIASTOLIC BLOOD PRESSURE: 70 MMHG | SYSTOLIC BLOOD PRESSURE: 130 MMHG | BODY MASS INDEX: 30.31 KG/M2 | WEIGHT: 200 LBS | HEART RATE: 77 BPM

## 2023-08-11 DIAGNOSIS — M25.561 CHRONIC PAIN OF RIGHT KNEE: ICD-10-CM

## 2023-08-11 DIAGNOSIS — Z87.891 FORMER SMOKER: ICD-10-CM

## 2023-08-11 DIAGNOSIS — E03.9 HYPOTHYROIDISM, UNSPECIFIED TYPE: ICD-10-CM

## 2023-08-11 DIAGNOSIS — J43.9 PULMONARY EMPHYSEMA, UNSPECIFIED EMPHYSEMA TYPE (HCC): ICD-10-CM

## 2023-08-11 DIAGNOSIS — G89.29 CHRONIC PAIN OF BOTH SHOULDERS: ICD-10-CM

## 2023-08-11 DIAGNOSIS — M25.551 BILATERAL HIP PAIN: ICD-10-CM

## 2023-08-11 DIAGNOSIS — M25.511 CHRONIC PAIN OF BOTH SHOULDERS: ICD-10-CM

## 2023-08-11 DIAGNOSIS — G89.29 CHRONIC PAIN OF RIGHT KNEE: ICD-10-CM

## 2023-08-11 DIAGNOSIS — M25.551 RIGHT HIP PAIN: ICD-10-CM

## 2023-08-11 DIAGNOSIS — M25.512 CHRONIC PAIN OF BOTH SHOULDERS: ICD-10-CM

## 2023-08-11 DIAGNOSIS — M23.91 INTERNAL DERANGEMENT OF RIGHT KNEE: ICD-10-CM

## 2023-08-11 DIAGNOSIS — Z12.31 BREAST CANCER SCREENING BY MAMMOGRAM: ICD-10-CM

## 2023-08-11 DIAGNOSIS — I10 ESSENTIAL HYPERTENSION: ICD-10-CM

## 2023-08-11 DIAGNOSIS — F32.A DEPRESSIVE DISORDER: ICD-10-CM

## 2023-08-11 DIAGNOSIS — Z00.00 MEDICARE ANNUAL WELLNESS VISIT, SUBSEQUENT: Primary | ICD-10-CM

## 2023-08-11 DIAGNOSIS — F41.9 ANXIETY: ICD-10-CM

## 2023-08-11 DIAGNOSIS — Z13.820 OSTEOPOROSIS SCREENING: ICD-10-CM

## 2023-08-11 DIAGNOSIS — M25.552 BILATERAL HIP PAIN: ICD-10-CM

## 2023-08-11 DIAGNOSIS — Z87.891 PERSONAL HISTORY OF NICOTINE DEPENDENCE: ICD-10-CM

## 2023-08-11 DIAGNOSIS — E78.2 MIXED HYPERLIPIDEMIA: ICD-10-CM

## 2023-08-11 PROBLEM — M54.2 NECK PAIN, ACUTE: Status: RESOLVED | Noted: 2022-02-22 | Resolved: 2023-08-11

## 2023-08-11 PROCEDURE — G0439 PPPS, SUBSEQ VISIT: HCPCS | Performed by: FAMILY MEDICINE

## 2023-08-11 PROCEDURE — 99214 OFFICE O/P EST MOD 30 MIN: CPT | Performed by: FAMILY MEDICINE

## 2023-08-11 RX ORDER — ATORVASTATIN CALCIUM 80 MG/1
80 TABLET, FILM COATED ORAL
Qty: 90 TABLET | Refills: 1 | Status: SHIPPED | OUTPATIENT
Start: 2023-08-11

## 2023-08-11 RX ORDER — AMLODIPINE BESYLATE 5 MG/1
5 TABLET ORAL DAILY
Qty: 90 TABLET | Refills: 1 | Status: SHIPPED | OUTPATIENT
Start: 2023-08-11

## 2023-08-11 RX ORDER — LEVOTHYROXINE SODIUM 0.12 MG/1
125 TABLET ORAL DAILY
Qty: 90 TABLET | Refills: 1 | Status: SHIPPED | OUTPATIENT
Start: 2023-08-11

## 2023-08-11 RX ORDER — VENLAFAXINE 75 MG/1
75 TABLET ORAL 2 TIMES DAILY
Qty: 180 TABLET | Refills: 1 | Status: SHIPPED | OUTPATIENT
Start: 2023-08-11

## 2023-08-11 NOTE — PROGRESS NOTES
Assessment and Plan:     Problem List Items Addressed This Visit        Endocrine    Hypothyroidism    Relevant Medications    levothyroxine 125 mcg tablet       Cardiovascular and Mediastinum    Essential hypertension    Relevant Medications    amLODIPine (NORVASC) 5 mg tablet       Musculoskeletal and Integument    Internal derangement of right knee    Relevant Orders    Ambulatory Referral to Physical Therapy       Other    Mixed hyperlipidemia    Relevant Medications    atorvastatin (LIPITOR) 80 mg tablet    Depressive disorder    Relevant Medications    venlafaxine (EFFEXOR) 75 mg tablet    Anxiety    Relevant Medications    venlafaxine (EFFEXOR) 75 mg tablet   Other Visit Diagnoses     Medicare annual wellness visit, subsequent    -  Primary    Pulmonary emphysema, unspecified emphysema type ([address])        Osteoporosis screening        Relevant Orders    DXA bone density spine hip and pelvis    Right hip pain        Bilateral hip pain        Relevant Orders    Ambulatory Referral to Physical Therapy    Chronic pain of right knee        Relevant Orders    Ambulatory Referral to Physical Therapy    Chronic pain of both shoulders        Relevant Orders    Ambulatory Referral to Physical Therapy    Personal history of nicotine dependence        Relevant Orders    CT lung screening program    Former smoker        Relevant Orders    CT lung screening program    Breast cancer screening by mammogram        Relevant Orders    Mammo screening bilateral w 3d & cad           Preventive health issues were discussed with patient, and age appropriate screening tests were ordered as noted in patient's After Visit Summary. Personalized health advice and appropriate referrals for health education or preventive services given if needed, as noted in patient's After Visit Summary.      History of Present Illness:     Patient presents for a Medicare Wellness Visit + f/u    Medicare AWV + f/u   Pt still on protonix 40mg QD- states she cannot go off, sx return even if she misses a single dose- I advise that she is due for f/u with her GI, that the GERD/PPI to be reassessed by GI  Sees cardiol soon  Also c/o "orthopedic didn't really do anything for me- they wanted me to go to orthopedic rehab, and working on one thing made other things worse"- some OTC analgesic meds can't tolerate due to GERD - "worst is when I'm sleeping, wakes me up a lot" - has not tried any meds, because doesn't know what's safe to take       Patient Care Team:  Ceasar Bates DO as PCP - General (Family Medicine)  Ishaan Curry DO (Gastroenterology)     Review of Systems:     Review of Systems     Problem List:     Patient Active Problem List   Diagnosis   • BMI 29.0-29.9,adult   • Anxiety   • Contact dermatitis and other eczema, due to unspecified cause   • Depressive disorder   • Essential hypertension   • Reflux involving intestinal tract   • Hypothyroidism   • Internal derangement of right knee   • Mixed hyperlipidemia   • Coronary artery disease involving native coronary artery of native heart without angina pectoris   • Daytime sleepiness   • Personal history of COVID-19   • History of non-ST elevation myocardial infarction (NSTEMI)   • History of subarachnoid hemorrhage   • History of seizure   • Hot flashes      Past Medical and Surgical History:     Past Medical History:   Diagnosis Date   • Acute medial meniscus tear of right knee 7/3/2019   • Depression 1969   • Disease of thyroid gland     Synthroid   • Essential hypertension    • Exertional chest pain    • Fall from slipping on ice 2/6/2022   • Fractures     - rt lower leg break, rt arm, break rt. pinky finger, ribs   • Heart disease    • Hypothyroidism    • Neck pain, acute 2/22/2022   • NSTEMI, initial episode of care ([address]) 1/23/2020   • SAH (subarachnoid hemorrhage) ([address]) 2/6/2022   • Stomach disorder    • Unstable angina pectoris ([address]) 01/28/2020     Past Surgical History:   Procedure Laterality Date   • ANGIOPLASTY     • CARDIAC CATHETERIZATION  2020    EF 75% There is a small LADD1 branch that has a severe stenosis.  Given the small size of the vessel the plan will be to treat medically   • TONSILLECTOMY        Family History:     Family History   Problem Relation Age of Onset   • Thyroid disease Mother    • Cancer Mother          80 yrs old lung cancer   • Heart attack Father    • Heart attack Brother    • Thyroid disease Maternal Grandmother    • Cancer Maternal Grandmother    • Diabetes Maternal Grandfather    • Heart attack Maternal Grandfather    • Heart attack Paternal Grandfather    • Heart attack Maternal Uncle       Social History:     Social History     Socioeconomic History   • Marital status: Single     Spouse name: None   • Number of children: None   • Years of education: None   • Highest education level: None   Occupational History   • None   Tobacco Use   • Smoking status: Former     Packs/day: 1.00     Years: 30.00     Total pack years: 30.00     Types: Cigarettes     Quit date:      Years since quittin.6   • Smokeless tobacco: Former     Quit date: 2009   Vaping Use   • Vaping Use: Never used   Substance and Sexual Activity   • Alcohol use: Not Currently     Comment: None for 10 yrs, rarely historically   • Drug use: Never   • Sexual activity: Not Currently     Partners: Female     Birth control/protection: None   Other Topics Concern   • None   Social History Narrative   • None     Social Determinants of Health     Financial Resource Strain: Low Risk  (8/10/2023)    Overall Financial Resource Strain (CARDIA)    • Difficulty of Paying Living Expenses: Not hard at all   Food Insecurity: No Food Insecurity (2022)    Hunger Vital Sign    • Worried About Running Out of Food in the Last Year: Never true    • Ran Out of Food in the Last Year: Never true   Transportation Needs: No Transportation Needs (8/10/2023)    PRAPARE - Transportation    • Lack of Transportation (Medical): No    • Lack of Transportation (Non-Medical): No   Physical Activity: Not on file   Stress: Not on file   Social Connections: Not on file   Intimate Partner Violence: Not on file   Housing Stability: Unknown (2/7/2022)    Housing Stability Vital Sign    • Unable to Pay for Housing in the Last Year: No    • Number of Places Lived in the Last Year: Not on file    • Unstable Housing in the Last Year: No      Medications and Allergies:     Current Outpatient Medications   Medication Sig Dispense Refill   • amLODIPine (NORVASC) 5 mg tablet Take 1 tablet (5 mg total) by mouth daily 90 tablet 1   • aspirin (ECOTRIN LOW STRENGTH) 81 mg EC tablet Take 1 tablet (81 mg total) by mouth daily 90 tablet 1   • atorvastatin (LIPITOR) 80 mg tablet Take 1 tablet (80 mg total) by mouth daily at bedtime 90 tablet 1   • ciclopirox (PENLAC) 8 % solution Apply topically daily at bedtime 6.6 mL 3   • levothyroxine 125 mcg tablet Take 1 tablet (125 mcg total) by mouth daily 90 tablet 1   • pantoprazole (PROTONIX) 40 mg tablet Take 1 tablet (40 mg total) by mouth daily 90 tablet 3   • venlafaxine (EFFEXOR) 75 mg tablet Take 1 tablet (75 mg total) by mouth 2 (two) times a day 180 tablet 1     No current facility-administered medications for this visit.      Allergies   Allergen Reactions   • Other Hives and Rash     Adhesive tape        Immunizations:     Immunization History   Administered Date(s) Administered   • COVID-19 MODERNA VACC 0.25 ML IM BOOSTER 11/29/2021   • COVID-19 MODERNA VACC 0.5 ML IM 03/18/2021, 04/16/2021   • Influenza, high dose seasonal 0.7 mL 01/10/2023   • Lyme Disease 08/01/2003   • Pneumococcal Conjugate Vaccine 20-valent (Pcv20), Polysace 05/31/2022   • Pneumococcal Polysaccharide PPV23 01/29/2020   • Rotavirus Tetravalent 08/01/2016   • Tuberculin Skin Test-PPD Intradermal 05/19/2011   • Unknown 08/01/2016      Health Maintenance:         Topic Date Due   • Breast Cancer Screening: Mammogram 02/10/2025 (Originally 2[birth year]20/1995)   • Colorectal Cancer Screening  06/07/2024   • Hepatitis C Screening  Completed         Topic Date Due   • COVID-19 Vaccine (4 - Rachell Level series) 01/24/2022   • Influenza Vaccine (1) 09/01/2023      Medicare Screening Tests and Risk Assessments:     Alyssa Aragon is here for her Subsequent Wellness visit. Health Risk Assessment:   Patient rates overall health as fair. Patient feels that their physical health rating is slightly worse. Patient is dissatisfied with their life. Eyesight was rated as same. Hearing was rated as same. Patient feels that their emotional and mental health rating is same. Patients states they are never, rarely angry. Patient states they are sometimes unusually tired/fatigued. Pain experienced in the last 7 days has been a lot. Patient's pain rating has been 6/10. Patient states that she has experienced no weight loss or gain in last 6 months. Physical improvement needs to happen. ..     (related to the pains she is having and "to everything" per pt at visit- "need to lose weight")    Fall Risk Screening: In the past year, patient has experienced: no history of falling in past year      Urinary Incontinence Screening:   Patient has not leaked urine accidently in the last six months. Home Safety:  Patient has trouble with stairs inside or outside of their home. Patient has working smoke alarms and has working carbon monoxide detector. Home safety hazards include: none. Nutrition:   Current diet is Other (please comment). No meat, or soda for 55 yrs. Too much carbs and dairy, not enough fruit/veggies    Medications:   Patient is not currently taking any over-the-counter supplements. Patient is able to manage medications. Activities of Daily Living (ADLs)/Instrumental Activities of Daily Living (IADLs):   Walk and transfer into and out of bed and chair?: Yes  Dress and groom yourself?: Yes    Bathe or shower yourself?: Yes    Feed yourself?  Yes  Do your laundry/housekeeping?: Yes  Manage your money, pay your bills and track your expenses?: Yes  Make your own meals?: Yes    Do your own shopping?: Yes    Previous Hospitalizations:   Any hospitalizations or ED visits within the last 12 months?: No      Advance Care Planning:   Living will: No    Durable POA for healthcare: No    Advanced directive: No    Advanced directive counseling given: Yes    Five wishes given: Yes      Cognitive Screening:   Provider or family/friend/caregiver concerned regarding cognition?: No    PREVENTIVE SCREENINGS      Cardiovascular Screening:    General: History Lipid Disorder and Risks and Benefits Discussed    Due for: Lipid Panel      Diabetes Screening:     General: Risks and Benefits Discussed    Due for: Blood Glucose      Colorectal Cancer Screening:     General: Screening Current      Breast Cancer Screening:     General: Risks and Benefits Discussed    Due for: Mammogram        Cervical Cancer Screening:    General: Risks and Benefits Discussed and Patient Declines      Osteoporosis Screening:    General: Risks and Benefits Discussed    Due for: DXA Axial      Abdominal Aortic Aneurysm (AAA) Screening:        General: Screening Not Indicated      Lung Cancer Screening:     General: Risks and Benefits Discussed    Due for: Low Dose CT (LDCT)      Hepatitis C Screening:    General: Screening Current    Screening, Brief Intervention, and Referral to Treatment (SBIRT)    Screening  Typical number of drinks in a day: 0  Typical number of drinks in a week: 0  Interpretation: Low risk drinking behavior.     AUDIT-C Screenin) How often did you have a drink containing alcohol in the past year? never  2) How many drinks did you have on a typical day when you were drinking in the past year? 0  3) How often did you have 6 or more drinks on one occasion in the past year? never    AUDIT-C Score: 0  Interpretation: Score 0-2 (female): Negative screen for alcohol misuse    Single Item Drug Screening:  How often have you used an illegal drug (including marijuana) or a prescription medication for non-medical reasons in the past year? never    Single Item Drug Screen Score: 0  Interpretation: Negative screen for possible drug use disorder    No results found. Physical Exam:     /70 (BP Location: Left arm, Patient Position: Sitting, Cuff Size: Standard)   Pulse 77   Temp 97.9 °F (36.6 °C) (Tympanic)   Ht 5' 8" (1.727 m)   Wt 90.7 kg (200 lb)   SpO2 95%   BMI 30.41 kg/m²     Physical Exam  Vitals and nursing note reviewed. Constitutional:       General: She is not in acute distress. Appearance: She is not ill-appearing, toxic-appearing or diaphoretic. HENT:      Head: Normocephalic and atraumatic. Nose: Nose normal.      Mouth/Throat:      Mouth: Mucous membranes are moist.   Eyes:      Conjunctiva/sclera: Conjunctivae normal.   Cardiovascular:      Rate and Rhythm: Normal rate and regular rhythm. Heart sounds: Normal heart sounds. Pulmonary:      Effort: Pulmonary effort is normal.      Breath sounds: Normal breath sounds and air entry. Skin:     Coloration: Skin is not pale. Neurological:      Mental Status: She is alert and oriented to person, place, and time.    Psychiatric:         Mood and Affect: Mood normal.          Eamon Bryson DO

## 2023-08-11 NOTE — PATIENT INSTRUCTIONS
Medicare Preventive Visit Patient Instructions  Thank you for completing your Welcome to Medicare Visit or Medicare Annual Wellness Visit today. Your next wellness visit will be due in one year (8/11/2024). The screening/preventive services that you may require over the next 5-10 years are detailed below. Some tests may not apply to you based off risk factors and/or age. Screening tests ordered at today's visit but not completed yet may show as past due. Also, please note that scanned in results may not display below. Preventive Screenings:  Service Recommendations Previous Testing/Comments   Colorectal Cancer Screening  * Colonoscopy    * Fecal Occult Blood Test (FOBT)/Fecal Immunochemical Test (FIT)  * Fecal DNA/Cologuard Test  * Flexible Sigmoidoscopy Age: 43-73 years old   Colonoscopy: every 10 years (may be performed more frequently if at higher risk)  OR  FOBT/FIT: every 1 year  OR  Cologuard: every 3 years  OR  Sigmoidoscopy: every 5 years  Screening may be recommended earlier than age 39 if at higher risk for colorectal cancer. Also, an individualized decision between you and your healthcare provider will decide whether screening between the ages of 77-80 would be appropriate. Colonoscopy: 02/17/2020  FOBT/FIT: Not on file  Cologuard: 06/07/2021  Sigmoidoscopy: Not on file    Screening Current     Breast Cancer Screening Age: 36 years old  Frequency: every 1-2 years  Not required if history of left and right mastectomy Mammogram: Not on file        Cervical Cancer Screening Between the ages of 21-29, pap smear recommended once every 3 years. Between the ages of 32-69, can perform pap smear with HPV co-testing every 5 years.    Recommendations may differ for women with a history of total hysterectomy, cervical cancer, or abnormal pap smears in past. Pap Smear: Not on file    Screening Not Indicated   Hepatitis C Screening Once for adults born between 1945 and 1965  More frequently in patients at high risk for Hepatitis C Hep C Antibody: 02/17/2020    Screening Current   Diabetes Screening 1-2 times per year if you're at risk for diabetes or have pre-diabetes Fasting glucose: 116 mg/dL (5/18/2021)  A1C: 5.6 % (1/23/2020)      Cholesterol Screening Once every 5 years if you don't have a lipid disorder. May order more often based on risk factors. Lipid panel: 05/17/2022    Screening Not Indicated  History Lipid Disorder     Other Preventive Screenings Covered by Medicare:  1. Abdominal Aortic Aneurysm (AAA) Screening: covered once if your at risk. You're considered to be at risk if you have a family history of AAA. 2. Lung Cancer Screening: covers low dose CT scan once per year if you meet all of the following conditions: (1) Age 48-67; (2) No signs or symptoms of lung cancer; (3) Current smoker or have quit smoking within the last 15 years; (4) You have a tobacco smoking history of at least 20 pack years (packs per day multiplied by number of years you smoked); (5) You get a written order from a healthcare provider. 3. Glaucoma Screening: covered annually if you're considered high risk: (1) You have diabetes OR (2) Family history of glaucoma OR (3)  aged 48 and older OR (3)  American aged 72 and older  3. Osteoporosis Screening: covered every 2 years if you meet one of the following conditions: (1) You're estrogen deficient and at risk for osteoporosis based off medical history and other findings; (2) Have a vertebral abnormality; (3) On glucocorticoid therapy for more than 3 months; (4) Have primary hyperparathyroidism; (5) On osteoporosis medications and need to assess response to drug therapy. · Last bone density test (DXA Scan): Not on file. 5. HIV Screening: covered annually if you're between the age of 14-79. Also covered annually if you are younger than 13 and older than 72 with risk factors for HIV infection.  For pregnant patients, it is covered up to 3 times per pregnancy. Immunizations:  Immunization Recommendations   Influenza Vaccine Annual influenza vaccination during flu season is recommended for all persons aged >= 6 months who do not have contraindications   Pneumococcal Vaccine   * Pneumococcal conjugate vaccine = PCV13 (Prevnar 13), PCV15 (Vaxneuvance), PCV20 (Prevnar 20)  * Pneumococcal polysaccharide vaccine = PPSV23 (Pneumovax) Adults 20-63 years old: 1-3 doses may be recommended based on certain risk factors  Adults 72 years old: 1-2 doses may be recommended based off what pneumonia vaccine you previously received   Hepatitis B Vaccine 3 dose series if at intermediate or high risk (ex: diabetes, end stage renal disease, liver disease)   Tetanus (Td) Vaccine - COST NOT COVERED BY MEDICARE PART B Following completion of primary series, a booster dose should be given every 10 years to maintain immunity against tetanus. Td may also be given as tetanus wound prophylaxis. Tdap Vaccine - COST NOT COVERED BY MEDICARE PART B Recommended at least once for all adults. For pregnant patients, recommended with each pregnancy. Shingles Vaccine (Shingrix) - COST NOT COVERED BY MEDICARE PART B  2 shot series recommended in those aged 48 and above     Health Maintenance Due:      Topic Date Due   • Breast Cancer Screening: Mammogram  02/10/2025 (Originally 2/20/1995)   • Colorectal Cancer Screening  06/07/2024   • Hepatitis C Screening  Completed     Immunizations Due:      Topic Date Due   • COVID-19 Vaccine (4 - Moderna series) 01/24/2022   • Influenza Vaccine (1) 09/01/2023     Advance Directives   What are advance directives? Advance directives are legal documents that state your wishes and plans for medical care. These plans are made ahead of time in case you lose your ability to make decisions for yourself. Advance directives can apply to any medical decision, such as the treatments you want, and if you want to donate organs.    What are the types of advance directives? There are many types of advance directives, and each state has rules about how to use them. You may choose a combination of any of the following:  · Living will: This is a written record of the treatment you want. You can also choose which treatments you do not want, which to limit, and which to stop at a certain time. This includes surgery, medicine, IV fluid, and tube feedings. · Durable power of  for healthcare [facility]): This is a written record that states who you want to make healthcare choices for you when you are unable to make them for yourself. This person, called a proxy, is usually a family member or a friend. You may choose more than 1 proxy. · Do not resuscitate (DNR) order:  A DNR order is used in case your heart stops beating or you stop breathing. It is a request not to have certain forms of treatment, such as CPR. A DNR order may be included in other types of advance directives. · Medical directive: This covers the care that you want if you are in a coma, near death, or unable to make decisions for yourself. You can list the treatments you want for each condition. Treatment may include pain medicine, surgery, blood transfusions, dialysis, IV or tube feedings, and a ventilator (breathing machine). · Values history: This document has questions about your views, beliefs, and how you feel and think about life. This information can help others choose the care that you would choose. Why are advance directives important? An advance directive helps you control your care. Although spoken wishes may be used, it is better to have your wishes written down. Spoken wishes can be misunderstood, or not followed. Treatments may be given even if you do not want them. An advance directive may make it easier for your family to make difficult choices about your care.    Weight Management   Why it is important to manage your weight:  Being overweight increases your risk of health conditions such as heart disease, high blood pressure, type 2 diabetes, and certain types of cancer. It can also increase your risk for osteoarthritis, sleep apnea, and other respiratory problems. Aim for a slow, steady weight loss. Even a small amount of weight loss can lower your risk of health problems. How to lose weight safely:  A safe and healthy way to lose weight is to eat fewer calories and get regular exercise. You can lose up about 1 pound a week by decreasing the number of calories you eat by 500 calories each day. Healthy meal plan for weight management:  A healthy meal plan includes a variety of foods, contains fewer calories, and helps you stay healthy. A healthy meal plan includes the following:  · Eat whole-grain foods more often. A healthy meal plan should contain fiber. Fiber is the part of grains, fruits, and vegetables that is not broken down by your body. Whole-grain foods are healthy and provide extra fiber in your diet. Some examples of whole-grain foods are whole-wheat breads and pastas, oatmeal, brown rice, and bulgur. · Eat a variety of vegetables every day. Include dark, leafy greens such as spinach, kale, trevor greens, and mustard greens. Eat yellow and orange vegetables such as carrots, sweet potatoes, and winter squash. · Eat a variety of fruits every day. Choose fresh or canned fruit (canned in its own juice or light syrup) instead of juice. Fruit juice has very little or no fiber. · Eat low-fat dairy foods. Drink fat-free (skim) milk or 1% milk. Eat fat-free yogurt and low-fat cottage cheese. Try low-fat cheeses such as mozzarella and other reduced-fat cheeses. · Choose meat and other protein foods that are low in fat. Choose beans or other legumes such as split peas or lentils. Choose fish, skinless poultry (chicken or turkey), or lean cuts of red meat (beef or pork). Before you cook meat or poultry, cut off any visible fat. · Use less fat and oil.   Try baking foods instead of frying them. Add less fat, such as margarine, sour cream, regular salad dressing and mayonnaise to foods. Eat fewer high-fat foods. Some examples of high-fat foods include french fries, doughnuts, ice cream, and cakes. · Eat fewer sweets. Limit foods and drinks that are high in sugar. This includes candy, cookies, regular soda, and sweetened drinks. Exercise:  Exercise at least 30 minutes per day on most days of the week. Some examples of exercise include walking, biking, dancing, and swimming. You can also fit in more physical activity by taking the stairs instead of the elevator or parking farther away from stores. Ask your healthcare provider about the best exercise plan for you. © Copyright [employer] 2018 Information is for End User's use only and may not be sold, redistributed or otherwise used for commercial purposes.  All illustrations and images included in CareNotes® are the copyrighted property of [employer] or [address] [place] [address]

## 2023-08-31 ENCOUNTER — HOSPITAL ENCOUNTER (OUTPATIENT)
Dept: CT IMAGING | Facility: HOSPITAL | Age: 68
End: 2023-08-31
Payer: COMMERCIAL

## 2023-08-31 DIAGNOSIS — Z87.891 FORMER SMOKER: ICD-10-CM

## 2023-08-31 DIAGNOSIS — Z87.891 PERSONAL HISTORY OF NICOTINE DEPENDENCE: ICD-10-CM

## 2023-08-31 PROCEDURE — 71271 CT THORAX LUNG CANCER SCR C-: CPT

## 2023-09-22 ENCOUNTER — OFFICE VISIT (OUTPATIENT)
Age: 68
End: 2023-09-22
Payer: COMMERCIAL

## 2023-09-22 VITALS
SYSTOLIC BLOOD PRESSURE: 142 MMHG | OXYGEN SATURATION: 98 % | HEIGHT: 68 IN | RESPIRATION RATE: 18 BRPM | BODY MASS INDEX: 30.46 KG/M2 | DIASTOLIC BLOOD PRESSURE: 92 MMHG | HEART RATE: 78 BPM | WEIGHT: 201 LBS

## 2023-09-22 DIAGNOSIS — K44.9 HIATAL HERNIA: ICD-10-CM

## 2023-09-22 DIAGNOSIS — Z12.12 ENCOUNTER FOR COLORECTAL CANCER SCREENING: ICD-10-CM

## 2023-09-22 DIAGNOSIS — K21.9 GASTROESOPHAGEAL REFLUX DISEASE, UNSPECIFIED WHETHER ESOPHAGITIS PRESENT: ICD-10-CM

## 2023-09-22 DIAGNOSIS — K21.9 GASTROESOPHAGEAL REFLUX DISEASE WITHOUT ESOPHAGITIS: Primary | ICD-10-CM

## 2023-09-22 DIAGNOSIS — Z12.11 ENCOUNTER FOR COLORECTAL CANCER SCREENING: ICD-10-CM

## 2023-09-22 PROCEDURE — 99214 OFFICE O/P EST MOD 30 MIN: CPT | Performed by: INTERNAL MEDICINE

## 2023-09-22 RX ORDER — PANTOPRAZOLE SODIUM 40 MG/1
40 TABLET, DELAYED RELEASE ORAL DAILY
Qty: 90 TABLET | Refills: 3 | Status: SHIPPED | OUTPATIENT
Start: 2023-09-22 | End: 2023-12-21

## 2023-09-22 NOTE — PROGRESS NOTES
[facility] - Outpatient Consultation  Desire Fernandez 80 y.o. female MRN: [id number]  Encounter: [id number]          ASSESSMENT AND PLAN:      1. Gastroesophageal reflux disease without esophagitis  2. Hiatal hernia  3. Encounter for colorectal cancer screening  4. Gastroesophageal reflux disease, unspecified whether esophagitis present  - pantoprazole (PROTONIX) 40 mg tablet; Take 1 tablet (40 mg total) by mouth daily  Dispense: 90 tablet; Refill: 1    79 y/o female with longstanding GERD with known moderate-sized hiatal hernia, presenting for follow-up regarding these complaints. Overall her symptoms are not well controlled primarily overnight she will wake up occasionally with significant reflux, as well as episodes of morning vomiting. Discussed that the for step would be to consider switching Protonix to the evening, to take 30 to 60 minutes before expected last meal of the day. If no improvement, could consider 1 of 2 alternatives, either changing PPI to stronger options such as Nexium, or increasing to twice daily. She will contact me through [address] [place] [address] depending on her response to switching Protonix to evening. If no improvement despite optimizing medical therapy, may need to consider repeat endoscopy. Discussed that long-term if symptoms cannot be controlled medically, consideration for hernia repair will be discussed. Due for repeat Cologuard in 2024 for up-to-date colorectal cancer screening.  ______________________________________________________________________    HPI:  Narendra Robertson is a pleasant 79 y/o female presenting for follow-up regarding GERD. Since her last office visit in August 2022, she had been overall doing well but notes that she will have intermittent nocturnal complaints of waking up with reflux into her nasal cavity. She does state that she eats frequently at night meals. She definitely notices the symptoms more when she is doing this.   She also has occasional morning vomiting even before eating. This may be more regurgitation. She has no dysphagia. No odynophagia. Her weight has been stable. Summary of HPI:   She had significant symptoms of reflux and regurgitation and was last seen in September 2021. She had an endoscopy in October 2021 which demonstrated a moderate-sized hiatal hernia, no esophagitis. She has been doing very well since starting on Protonix daily. When she has had short periods of running out of the medication her symptoms return fairly quickly. Otherwise she denies any breakthrough symptoms along she takes Protonix daily. She is not currently taking it appropriately, usually taking with meals shortly after.     She is up-to-date on colorectal cancer screening, negative Cologuard in 2021. I have advised her that she would be due for repeat in June 2024. REVIEW OF SYSTEMS:    CONSTITUTIONAL: Denies any fever, chills, rigors, and weight loss. HEENT: Denies odynophagia, tinnitus  CARDIOVASCULAR: No chest pain or palpitations. RESPIRATORY: Denies any cough, hemoptysis, shortness of breath or dyspnea on exertion. GASTROINTESTINAL: As noted in the History of Present Illness. GENITOURINARY: No problems with urination. Denies any hematuria or dysuria. NEUROLOGIC: No dizziness or vertigo, denies headaches. MUSCULOSKELETAL: Denies any muscle or joint pain. SKIN: Denies skin rashes or itching. ENDOCRINE:  Denies intolerance to heat or cold. PSYCHOSOCIAL: Denies depression or anxiety. Denies any recent memory loss.        Historical Information   Past Medical History:   Diagnosis Date   • Acute medial meniscus tear of right knee 7/3/2019   • Depression 1969   • Disease of thyroid gland     Synthroid   • Essential hypertension    • Exertional chest pain    • Fall from slipping on ice 2/6/2022   • Fractures     - rt lower leg break, rt arm, break rt. pinky finger, ribs   • Heart disease    • Hypothyroidism    • Neck pain, acute 2022   • NSTEMI, initial episode of care [facility]) 2020   • SAH (subarachnoid hemorrhage) ([facility]) 2022   • Stomach disorder    • Unstable angina pectoris ([facility]) 2020     Past Surgical History:   Procedure Laterality Date   • ANGIOPLASTY     • CARDIAC CATHETERIZATION  2020    EF 75% There is a small LADD1 branch that has a severe stenosis. Given the small size of the vessel the plan will be to treat medically   • TONSILLECTOMY       Social History   Social History     Substance and Sexual Activity   Alcohol Use Not Currently    Comment: None for 10 yrs, rarely historically     Social History     Substance and Sexual Activity   Drug Use Never     Social History     Tobacco Use   Smoking Status Former   • Packs/day: 1.00   • Years: 30.00   • Total pack years: 30.00   • Types: Cigarettes   • Quit date:    • Years since quittin.7   Smokeless Tobacco Former   • Quit date: 2009     Family History   Problem Relation Age of Onset   • Thyroid disease Mother    • Cancer Mother          80 yrs old lung cancer   • Heart attack Father    • Heart attack Brother    • Thyroid disease Maternal Grandmother    • Cancer Maternal Grandmother    • Diabetes Maternal Grandfather    • Heart attack Maternal Grandfather    • Heart attack Paternal Grandfather    • Heart attack Maternal Uncle        Meds/Allergies       Current Outpatient Medications:   •  amLODIPine (NORVASC) 5 mg tablet  •  aspirin (ECOTRIN LOW STRENGTH) 81 mg EC tablet  •  atorvastatin (LIPITOR) 80 mg tablet  •  ciclopirox (PENLAC) 8 % solution  •  levothyroxine 125 mcg tablet  •  pantoprazole (PROTONIX) 40 mg tablet  •  venlafaxine (EFFEXOR) 75 mg tablet    Allergies   Allergen Reactions   • Other Hives and Rash     Adhesive tape             Objective     Blood pressure 142/92, pulse 78, resp. rate 18, height 5' 8" (1.727 m), weight 91.2 kg (201 lb), SpO2 98 %. Body mass index is 30.56 kg/m².         PHYSICAL EXAM:      General Appearance:   Alert, cooperative, no distress   HEENT:   Normocephalic, atraumatic, anicteric. Neck:  Supple, symmetrical, trachea midline   Lungs:   Clear to auscultation bilaterally; no rales, rhonchi or wheezing; respirations unlabored    Heart[de-identified]   Regular rate and rhythm; no murmur, rub, or gallop. Abdomen:   Soft, non-tender, non-distended; normal bowel sounds; no masses, no organomegaly    Genitalia:   Deferred    Rectal:   Deferred    Extremities:  No cyanosis, clubbing or edema    Pulses:  2+ and symmetric    Skin:  No jaundice, rashes, or lesions          Lab Results:   No visits with results within 1 Day(s) from this visit. Latest known visit with results is:   Appointment on 05/17/2022   Component Date Value   • TSH 3RD GENERATON 05/17/2022 3.610    • Free T4 05/17/2022 0.79    • Cholesterol 05/17/2022 138    • Triglycerides 05/17/2022 71    • HDL, Direct 05/17/2022 48 (L)    • LDL Calculated 05/17/2022 76    • Non-HDL-Chol (CHOL-HDL) 05/17/2022 90          Radiology Results:   CT lung screening program    Result Date: 9/8/2023  Narrative: CT CHEST LUNG CANCER SCREENING WITHOUT IV CONTRAST INDICATION:   Z87.891: Personal history of nicotine dependence. COMPARISON: CT scan 2/6/2022. TECHNIQUE:  Unenhanced CT examination of the chest was performed utilizing a low dose protocol. Multiplanar 2D reformatted images were created from the source data. Radiation dose length product (DLP) for this visit:  283.74 mGy-cm . This examination, like all CT scans performed in the [facility], was performed utilizing techniques to minimize radiation dose exposure, including the use of iterative  reconstruction and automated exposure control. FINDINGS: LUNGS: Stable background emphysema. No nodule or consolidation. Stable minor scarring in the right middle lobe. No endotracheal or endobronchial lesion identified. PLEURA:  Unremarkable. HEART/GREAT VESSELS: Heart is unremarkable for patient's age. No thoracic aortic aneurysm. Stable coronary artery calcifications. MEDIASTINUM AND NAVNEET:  Unremarkable. CHEST WALL AND LOWER NECK:  Unremarkable. VISUALIZED STRUCTURES IN THE UPPER ABDOMEN: Stable hepatic and bilateral renal cysts. Stable small hiatal hernia. Upper abdomen otherwise unremarkable. OSSEOUS STRUCTURES:  No acute fracture or destructive osseous lesion. Impression: Stable background emphysema. No nodules and/or definitely benign nodules. Lung-RADS1, negative. Continued annual screening with LDCT in 12 months.  Workstation performed: [id number]

## 2023-09-25 ENCOUNTER — HOSPITAL ENCOUNTER (OUTPATIENT)
Dept: BONE DENSITY | Facility: HOSPITAL | Age: 68
Discharge: HOME | End: 2023-09-25
Payer: COMMERCIAL

## 2023-09-25 ENCOUNTER — HOSPITAL ENCOUNTER (OUTPATIENT)
Dept: MAMMOGRAPHY | Facility: HOSPITAL | Age: 68
Discharge: HOME | End: 2023-09-25
Payer: COMMERCIAL

## 2023-09-25 VITALS — BODY MASS INDEX: 29.71 KG/M2 | WEIGHT: 200.62 LBS | HEIGHT: 69 IN

## 2023-09-25 VITALS — WEIGHT: 201 LBS | HEIGHT: 68 IN | BODY MASS INDEX: 30.46 KG/M2

## 2023-09-25 DIAGNOSIS — Z12.31 BREAST CANCER SCREENING BY MAMMOGRAM: ICD-10-CM

## 2023-09-25 DIAGNOSIS — Z13.820 OSTEOPOROSIS SCREENING: ICD-10-CM

## 2023-09-25 PROCEDURE — 77067 SCR MAMMO BI INCL CAD: CPT

## 2023-09-25 PROCEDURE — 77080 DXA BONE DENSITY AXIAL: CPT

## 2023-09-25 PROCEDURE — 77063 BREAST TOMOSYNTHESIS BI: CPT

## 2023-10-24 ENCOUNTER — OFFICE VISIT (OUTPATIENT)
Dept: CARDIOLOGY CLINIC | Facility: CLINIC | Age: 68
End: 2023-10-24
Payer: COMMERCIAL

## 2023-10-24 VITALS
HEIGHT: 69 IN | WEIGHT: 204 LBS | BODY MASS INDEX: 30.21 KG/M2 | SYSTOLIC BLOOD PRESSURE: 120 MMHG | HEART RATE: 70 BPM | DIASTOLIC BLOOD PRESSURE: 80 MMHG

## 2023-10-24 DIAGNOSIS — E78.2 MIXED HYPERLIPIDEMIA: ICD-10-CM

## 2023-10-24 DIAGNOSIS — I25.10 CORONARY ARTERY DISEASE INVOLVING NATIVE CORONARY ARTERY OF NATIVE HEART WITHOUT ANGINA PECTORIS: Primary | ICD-10-CM

## 2023-10-24 PROCEDURE — 93000 ELECTROCARDIOGRAM COMPLETE: CPT | Performed by: INTERNAL MEDICINE

## 2023-10-24 PROCEDURE — 99213 OFFICE O/P EST LOW 20 MIN: CPT | Performed by: INTERNAL MEDICINE

## 2023-10-24 NOTE — PROGRESS NOTES
Patient ID: Sophie Mcclure is a 80 y.o. female. Plan:      Coronary artery disease involving native coronary artery of native heart without angina pectoris  Relatively mild by cath. Mixed hyperlipidemia  Tolerating high-intensity statin therapy and will continue current regimen. Follow up Plan/Other summary comments:  1 year ecg and f/u visit unless interval issues. HPI: Patient seen in f/u regarding the above issues. No CP since the last visit. She remains physically active. In January of 2020 she experience indigestion and went to [facility]. Blood pressure was quite high. There was a non ST elevation MI. Coronary angiography revealed severe stenosis of a small 1st diagonal vessel but otherwise mild diffuse disease. Medical therapy was of course recommended. Results for orders placed or performed in visit on 10/24/23   POCT ECG    Impression    NSR. WNL. Most recent or relevant cardiac/vascular testing:    None recently. Past Surgical History:   Procedure Laterality Date    ANGIOPLASTY      CARDIAC CATHETERIZATION  01/22/2020    EF 75% There is a small LADD1 branch that has a severe stenosis. Given the small size of the vessel the plan will be to treat medically    TONSILLECTOMY         Lipid Profile:   Lab Results   Component Value Date    TRIG 71 05/17/2022    HDL 48 (L) 05/17/2022         Review of Systems   10  point ROS  was otherwise non pertinent or negative except as per HPI or as below. Gait: Normal.        Objective:     /80   Pulse 70   Ht 5' 9" (1.753 m)   Wt 92.5 kg (204 lb)   BMI 30.13 kg/m²     PHYSICAL EXAM:    General:  Normal appearance in no distress. Eyes:  Anicteric. Oral mucosa:  Moist.  Neck:  No JVD. Carotid upstrokes are brisk without bruits. No masses. Chest:  Clear to auscultation. Cardiac:  No palpable PMI. Normal S1 and S2. No murmur gallop or rub. Abdomen:  Soft and nontender.  No palpable organomegaly or aortic enlargement. Extremities:  No peripheral edema. Musculoskeletal:  Symmetric. Vascular:  Femoral pulses are brisk without bruits. Popliteal pulses are intact bilaterally. Pedal pulses are intact. Neuro:  Grossly symmetric. Psych:  Alert and oriented x3.         Current Outpatient Medications:     amLODIPine (NORVASC) 5 mg tablet, Take 1 tablet (5 mg total) by mouth daily, Disp: 90 tablet, Rfl: 1    aspirin (ECOTRIN LOW STRENGTH) 81 mg EC tablet, Take 1 tablet (81 mg total) by mouth daily, Disp: 90 tablet, Rfl: 1    atorvastatin (LIPITOR) 80 mg tablet, Take 1 tablet (80 mg total) by mouth daily at bedtime, Disp: 90 tablet, Rfl: 1    ciclopirox (PENLAC) 8 % solution, Apply topically daily at bedtime, Disp: 6.6 mL, Rfl: 3    levothyroxine 125 mcg tablet, Take 1 tablet (125 mcg total) by mouth daily, Disp: 90 tablet, Rfl: 1    pantoprazole (PROTONIX) 40 mg tablet, Take 1 tablet (40 mg total) by mouth daily, Disp: 90 tablet, Rfl: 3    venlafaxine (EFFEXOR) 75 mg tablet, Take 1 tablet (75 mg total) by mouth 2 (two) times a day, Disp: 180 tablet, Rfl: 1  Allergies   Allergen Reactions    Other Hives and Rash     Adhesive tape       Past Medical History:   Diagnosis Date    Acute medial meniscus tear of right knee 7/3/2019    Depression 1969    Disease of thyroid gland     Synthroid    Essential hypertension     Exertional chest pain     Fall from slipping on ice 2022    Fractures     - rt lower leg break, rt arm, break rt. pinky finger, ribs    Heart disease     Hypothyroidism     Neck pain, acute 2022    NSTEMI, initial episode of care ([address] ) 2020    SAH (subarachnoid hemorrhage) ([address] ) 2022    Stomach disorder     Unstable angina pectoris ([facility]) 2020           Social History     Tobacco Use   Smoking Status Former    Packs/day: 1.00    Years: 30.00    Total pack years: 30.00    Types: Cigarettes    Quit date:     Years since quittin.8   Smokeless Tobacco Former    Quit date: 2009

## 2023-10-24 NOTE — ASSESSMENT & PLAN NOTE
Relatively mild by cath.
Tolerating high-intensity statin therapy and will continue current regimen.
Yes

## 2023-11-20 ENCOUNTER — TELEPHONE (OUTPATIENT)
Dept: NEUROSURGERY | Facility: CLINIC | Age: 68
End: 2023-11-20

## 2023-11-20 NOTE — TELEPHONE ENCOUNTER
Patient called because she received a letter to follow up and schedule a CTH. She was last seen in Feb 2022 virtually and was supposed to have a [address] completed after resuming her ASA within two weeks. Patient stated she resumed ASA immediately but never had the scan completed. She reports she is doing well- denies any headaches, dizziness, nausea, vomiting, changes to her vision or mental status. Discussed with MELANI Dubose who doesnt feel that patient needs to have the CT scan done at this point or have a follow up in the absence of symptoms since it's been almost 2 years after resuming the asa. Patient verbalized understanding.

## 2023-11-22 ENCOUNTER — IMMUNIZATIONS (OUTPATIENT)
Dept: FAMILY MEDICINE CLINIC | Facility: CLINIC | Age: 68
End: 2023-11-22
Payer: COMMERCIAL

## 2023-11-22 DIAGNOSIS — Z23 ENCOUNTER FOR IMMUNIZATION: Primary | ICD-10-CM

## 2023-11-22 PROCEDURE — 90662 IIV NO PRSV INCREASED AG IM: CPT

## 2023-11-22 PROCEDURE — G0008 ADMIN INFLUENZA VIRUS VAC: HCPCS

## 2023-12-05 ENCOUNTER — NURSE TRIAGE (OUTPATIENT)
Age: 68
End: 2023-12-05

## 2023-12-05 NOTE — TELEPHONE ENCOUNTER
I agree with documentation per RN. Patient should keep upcoming office visit as scheduled and report to the ER with any new or worsening symptoms.

## 2023-12-05 NOTE — TELEPHONE ENCOUNTER
Regarding: pain in stomach  ----- Message from Sherita Grace sent at 12/5/2023  9:21 AM EST -----  Pt would like a call back regarding her pain in her stomach she says it feels like something ripped?

## 2023-12-05 NOTE — TELEPHONE ENCOUNTER
Last ov 9/22/23 Dr. Heaven Thompson GERD, Follow up 12/19/23, Procedure EGD 10/26/21, Labs 5/17/22    FYI:  Patient called in stating she feels "something ripped" after vomiting this morning. She admits to eating late in evening last night before bed. This morning vomited into sink and felt pain in flank area after retching. Advised ER/urgent care evaluation which she declines. Patient unsure if she could have hernia, no swelling at area. Patient states symptoms have improved with sitting and rest. Patient scheduled for evaluation this afternoon at [facility]. Patient advised not to eat late in evening and if she does she should wait 1/2 to one hour prior to laying down in future. Patient admits she does know better and will try to follow plan in future. Reason for Disposition   Patient wants to be seen    Answer Assessment - Initial Assessment Questions  1. LOCATION: "Where does it hurt?"       Flank area  2. RADIATION: "Does the pain shoot anywhere else?" (e.g., chest, back)      no  3. ONSET: "When did the pain begin?" (e.g., minutes, hours or days ago)       This morning  4. SUDDEN: "Gradual or sudden onset?"      sudden  5. PATTERN "Does the pain come and go, or is it constant?"     - If constant: "Is it getting better, staying the same, or worsening?"       (Note: Constant means the pain never goes away completely; most serious pain is constant and it progresses)      - If intermittent: "How long does it last?" "Do you have pain now?"      (Note: Intermittent means the pain goes away completely between bouts)      Intermittent currently  6. SEVERITY: "How bad is the pain?"  (e.g., Scale 1-10; mild, moderate, or severe)    - MILD (1-3): doesn't interfere with normal activities, abdomen soft and not tender to touch     - MODERATE (4-7): interferes with normal activities or awakens from sleep, tender to touch     - SEVERE (8-10): excruciating pain, doubled over, unable to do any normal activities       moderate  7. RECURRENT SYMPTOM: "Have you ever had this type of stomach pain before?" If Yes, ask: "When was the last time?" and "What happened that time?"       Previous kidney stones "twice in life"  8. CAUSE: "What do you think is causing the stomach pain?"      Eating late last night, vomiting this morning  9. RELIEVING/AGGRAVATING FACTORS: "What makes it better or worse?" (e.g., movement, antacids, bowel movement)      Pain lessened after sitting  10. OTHER SYMPTOMS: "Has there been any vomiting, diarrhea, constipation, or urine problems?"        Vomited this morning  11.  PREGNANCY: "Is there any chance you are pregnant?" "When was your last menstrual period?"        N/A    Protocols used: Abdominal Pain - Female-ADULT-[place]

## 2023-12-12 ENCOUNTER — OFFICE VISIT (OUTPATIENT)
Age: 68
End: 2023-12-12
Payer: COMMERCIAL

## 2023-12-12 VITALS
WEIGHT: 204 LBS | HEART RATE: 85 BPM | DIASTOLIC BLOOD PRESSURE: 74 MMHG | OXYGEN SATURATION: 99 % | BODY MASS INDEX: 30.21 KG/M2 | RESPIRATION RATE: 18 BRPM | SYSTOLIC BLOOD PRESSURE: 102 MMHG | HEIGHT: 69 IN

## 2023-12-12 DIAGNOSIS — K21.9 GASTROESOPHAGEAL REFLUX DISEASE, UNSPECIFIED WHETHER ESOPHAGITIS PRESENT: ICD-10-CM

## 2023-12-12 DIAGNOSIS — R11.2 NAUSEA AND VOMITING, UNSPECIFIED VOMITING TYPE: ICD-10-CM

## 2023-12-12 DIAGNOSIS — Z12.11 SCREENING FOR COLON CANCER: ICD-10-CM

## 2023-12-12 DIAGNOSIS — R10.31 RIGHT LOWER QUADRANT ABDOMINAL PAIN: Primary | ICD-10-CM

## 2023-12-12 DIAGNOSIS — S30.1XXA TRAUMATIC ECCHYMOSIS OF ABDOMINAL WALL, INITIAL ENCOUNTER: ICD-10-CM

## 2023-12-12 PROCEDURE — 99214 OFFICE O/P EST MOD 30 MIN: CPT | Performed by: NURSE PRACTITIONER

## 2023-12-12 NOTE — PROGRESS NOTES
[facility] - Outpatient Follow-up Note  Gary Bocanegra 80 y.o. female MRN: [id number]  Encounter: [id number]          ASSESSMENT AND PLAN:    The patient presents today for follow-up office visit regarding new onset right-sided upper and lower quadrant abdominal pain with ecchymosis with a history of GERD and chronic nausea and vomiting. Exam: Abdomen is soft, mildly tender in the right lower quadrant without any significant guarding or rebound tenderness noted upon exam, with faint bowel sounds x 4. No edema noted of the b/l lower extremities upon exam today. Skin is non-icteric. 1. Right lower quadrant abdominal pain  2. Traumatic ecchymosis of abdominal wall, initial encounter  While the patient was in the office today, I discussed with the patient that although her symptoms are improving and I am highly suspicious she may have torn a muscle or ligament in her abdomen and because she is on blood thinners is why she has the hematoma and ecchymosis, however, I still feel it is in her best interest because it was so painful to proceed with a CT of the abdomen and pelvis with IV and oral contrast.  I advised the patient that as soon as we have the results of the CT scan, our office will call her to review the results and any other treatment plan recommendations. Encouraged the patient to continue to keep her stools loose for now and to monitor for any overt signs of GI bleeding. The patient was agreeable and verbalized an understanding. Encouraged the patient continue to drink at least 64 ounces of water daily. - CT abdomen pelvis w contrast; Future    3. Gastroesophageal reflux disease, unspecified whether esophagitis present  4.  Nausea and vomiting, unspecified vomiting type  Stable    I discussed with the patient that since the Protonix seems to keep her reflux, nausea, vomit symptoms to a minimal and she typically only has the nausea and vomiting in the morning if she eats too close to bed, otherwise she is doing much better, then for now, I would recommend she continue with the Protonix as prescribed and avoid eating within the 2 to 3-hour window prior to going to bed or laying down. The patient was agreeable and verbalized an understanding. 5. Screening for colon cancer  The patient previously had a negative Cologuard screening in June 2021 and at this point since she is not having any significant red flag symptoms we recommend she proceed with a repeat Cologuard screening or colonoscopy as per guideline recommendations. The patient was agreeable and verbalized an understanding. DUE: For repeat Cologuard: 6/7/24    While the patient was in the office today we did review GI red flag symptoms, including, but not limited to: chronic nausea, vomiting, diarrhea, chills, fever, and unintentional weight loss and should call or contact our office with any changes or concerns. I reviewed with the patient that if they notice any blood while vomiting or in their stool they should contact or office or go to the nearest emergency room for immediate evaluation. The patient was agreeable and verbalized an understanding. The patient will schedule a follow up office visit in 3 to 4 months with Dr. Keyshawn Walsh, but understands to call or contact our office if there are any issues or concerns in the mean time. ______________________________________________________________________    SUBJECTIVE: Patient is a 80 y.o. female who was previously seen in our office on 9/22/23 by Dr Keyshawn Walsh and presents today for follow-up office visit regarding new onset right-sided upper and lower quadrant abdominal pain with ecchymosis with a history of GERD and chronic nausea and vomiting. The patient reports that 1 week ago felt nauseated after eating too close to bedtime the night before, and was bent over to vomit in the sink and felt an sharp tearing sensation in her right side. The patient reports that initially the pain was so severe she thought she was going to have to go to the ER, but with time, rest, topical treatment, she was able to nurse things back to health and feels that at this point the pain is almost resolved, but she still has a very large ecchymotic area on the right lower quadrant and flank area. The patient reports that because she was afraid of having to strain she was eating extra prunes to make sure her stools looser. Patient denies any blood in her vomit or stool as well as any melena. She reports that her last bowel movement was today and is soft and loose, but normal.    Meds: Protonix 40 mg QD.   NSAID Use: Denied    Imaging: (None):     Endoscopy History: EGD: (10/26/21): Moderate hiatal hernia with evidence of gastritis. Path: Mild gastritis. COLONOSCOPY: (None): Negative Cologuard: 6/7/21    DUE: For repeat Cologuard: 6/7/24    REVIEW OF SYSTEMS IS OTHERWISE NEGATIVE. Historical Information   Past Medical History:   Diagnosis Date    Acute medial meniscus tear of right knee 7/3/2019    Depression 1969    Disease of thyroid gland     Synthroid    Essential hypertension     Exertional chest pain     Fall from slipping on ice 2/6/2022    Fractures     - rt lower leg break, rt arm, break rt. pinky finger, ribs    Heart disease     Hypothyroidism     Neck pain, acute 2/22/2022    NSTEMI, initial episode of care [facility]) 1/23/2020    SAH (subarachnoid hemorrhage) ([facility]) 2/6/2022    Stomach disorder     Unstable angina pectoris ([address] [facility]) 01/28/2020     Past Surgical History:   Procedure Laterality Date    ANGIOPLASTY      CARDIAC CATHETERIZATION  01/22/2020    EF 75% There is a small LADD1 branch that has a severe stenosis.  Given the small size of the vessel the plan will be to treat medically    TONSILLECTOMY       Social History   Social History     Substance and Sexual Activity   Alcohol Use Not Currently    Comment: None for 10 yrs, rarely historically Social History     Substance and Sexual Activity   Drug Use Never     Social History     Tobacco Use   Smoking Status Former    Packs/day: 1.00    Years: 30.00    Total pack years: 30.00    Types: Cigarettes    Quit date:     Years since quittin.9   Smokeless Tobacco Former    Quit date: 2009     Family History   Problem Relation Age of Onset    Thyroid disease Mother     Cancer Mother          80 yrs old lung cancer    Heart attack Father     No Known Problems Sister     Thyroid disease Maternal Grandmother     Cancer Maternal Grandmother     Diabetes Maternal Grandfather     Heart attack Maternal Grandfather     Heart attack Paternal Grandfather     Heart attack Brother     Heart attack Maternal Uncle        Meds/Allergies       Current Outpatient Medications:     amLODIPine (NORVASC) 5 mg tablet    aspirin (ECOTRIN LOW STRENGTH) 81 mg EC tablet    atorvastatin (LIPITOR) 80 mg tablet    ciclopirox (PENLAC) 8 % solution    levothyroxine 125 mcg tablet    pantoprazole (PROTONIX) 40 mg tablet    venlafaxine (EFFEXOR) 75 mg tablet    Allergies   Allergen Reactions    Other Hives and Rash     Adhesive tape             Objective     There were no vitals taken for this visit. There is no height or weight on file to calculate BMI. PHYSICAL EXAM:      General Appearance:   Alert, cooperative, no distress   HEENT:   Normocephalic, atraumatic, anicteric. Neck:  Supple, symmetrical, trachea midline   Lungs:   Clear to auscultation bilaterally; no rales, rhonchi or wheezing; respirations unlabored    Heart[de-identified]   Regular rate and rhythm; no murmur, rub, or gallop.    Abdomen:   Soft, non-tender, non-distended; normal bowel sounds; no masses, no organomegaly    Genitalia:   Deferred    Rectal:   Deferred    Extremities:  No cyanosis, clubbing or edema    Pulses:  2+ and symmetric    Skin:  No jaundice, rashes, or lesions    Lymph nodes:  No palpable cervical lymphadenopathy        Lab Results:   No visits with results within 1 Day(s) from this visit. Latest known visit with results is:   Appointment on 05/17/2022   Component Date Value    TSH 3RD GENERATON 05/17/2022 3.610     Free T4 05/17/2022 0.79     Cholesterol 05/17/2022 138     Triglycerides 05/17/2022 71     HDL, Direct 05/17/2022 48 (L)     LDL Calculated 05/17/2022 76     Non-HDL-Chol (CHOL-HDL) 05/17/2022 90          Radiology Results:   No results found. Answers submitted by the patient for this visit:  Abdominal Pain Questionnaire (Submitted on 12/5/2023)  Chief Complaint: Abdominal pain  Chronicity: new  Onset: today  Onset quality: sudden  Frequency: rarely  Episode duration: 0 Hours  Progression since onset: unchanged  Pain location: RUQ  Pain - numeric: 7/10  Pain quality: sharp, tearing  Radiates to: does not radiate  anorexia: No  arthralgias: Yes  belching: No  constipation: No  diarrhea: No  dysuria: No  fever: No  flatus: No  frequency: No  headaches: No  hematochezia: No  hematuria: No  melena: No  myalgias: Yes  nausea:  No  weight loss: No  vomiting: No  Aggravated by: coughing, movement  Relieved by: being still  Diagnostic workup: CT scan

## 2023-12-12 NOTE — PATIENT INSTRUCTIONS
Proceed with CT scan with oral and IV contrast.   Continue with your home bowel regiment. Continue to drink at least 64 oz of water daily. Continue Protonix as prescribed. Continue to monitor for red flag symptoms. Schedule a f/u OV in 3-4 months with Dr Aura Porter    While the patient was in the office today we did review GI red flag symptoms, including, but not limited to: chronic nausea, vomiting, diarrhea, chills, fever, and unintentional weight loss and should call or contact our office with any changes or concerns. I reviewed with the patient that if they notice any blood while vomiting or in their stool they should contact or office or go to the nearest emergency room for immediate evaluation. The patient was agreeable and verbalized an understanding.

## 2023-12-18 ENCOUNTER — APPOINTMENT (OUTPATIENT)
Dept: LAB | Facility: CLINIC | Age: 68
End: 2023-12-18
Payer: COMMERCIAL

## 2023-12-18 DIAGNOSIS — E03.9 HYPOTHYROIDISM, UNSPECIFIED TYPE: ICD-10-CM

## 2023-12-18 DIAGNOSIS — E78.2 MIXED HYPERLIPIDEMIA: ICD-10-CM

## 2023-12-18 DIAGNOSIS — R23.2 HOT FLASHES: ICD-10-CM

## 2023-12-18 DIAGNOSIS — I10 ESSENTIAL HYPERTENSION: ICD-10-CM

## 2023-12-18 LAB
ALBUMIN SERPL BCP-MCNC: 4.2 G/DL (ref 3.5–5)
ALP SERPL-CCNC: 71 U/L (ref 34–104)
ALT SERPL W P-5'-P-CCNC: 40 U/L (ref 7–52)
ANION GAP SERPL CALCULATED.3IONS-SCNC: 8 MMOL/L
AST SERPL W P-5'-P-CCNC: 28 U/L (ref 13–39)
BILIRUB SERPL-MCNC: 0.53 MG/DL (ref 0.2–1)
BUN SERPL-MCNC: 15 MG/DL (ref 5–25)
CALCIUM SERPL-MCNC: 8.9 MG/DL (ref 8.4–10.2)
CHLORIDE SERPL-SCNC: 107 MMOL/L (ref 96–108)
CHOLEST SERPL-MCNC: 159 MG/DL
CO2 SERPL-SCNC: 28 MMOL/L (ref 21–32)
CREAT SERPL-MCNC: 0.84 MG/DL (ref 0.6–1.3)
GFR SERPL CREATININE-BSD FRML MDRD: 72 ML/MIN/1.73SQ M
GLUCOSE P FAST SERPL-MCNC: 114 MG/DL (ref 65–99)
HDLC SERPL-MCNC: 41 MG/DL
LDLC SERPL CALC-MCNC: 95 MG/DL (ref 0–100)
NONHDLC SERPL-MCNC: 118 MG/DL
POTASSIUM SERPL-SCNC: 4.3 MMOL/L (ref 3.5–5.3)
PROT SERPL-MCNC: 6.7 G/DL (ref 6.4–8.4)
SODIUM SERPL-SCNC: 143 MMOL/L (ref 135–147)
T4 FREE SERPL-MCNC: 0.81 NG/DL (ref 0.61–1.12)
TRIGL SERPL-MCNC: 114 MG/DL
TSH SERPL DL<=0.05 MIU/L-ACNC: 4.02 UIU/ML (ref 0.45–4.5)

## 2023-12-18 PROCEDURE — 80053 COMPREHEN METABOLIC PANEL: CPT

## 2023-12-18 PROCEDURE — 80061 LIPID PANEL: CPT

## 2023-12-18 PROCEDURE — 84443 ASSAY THYROID STIM HORMONE: CPT

## 2023-12-18 PROCEDURE — 36415 COLL VENOUS BLD VENIPUNCTURE: CPT

## 2023-12-18 PROCEDURE — 84439 ASSAY OF FREE THYROXINE: CPT

## 2024-01-31 ENCOUNTER — OFFICE VISIT (OUTPATIENT)
Dept: FAMILY MEDICINE CLINIC | Facility: CLINIC | Age: 69
End: 2024-01-31
Payer: COMMERCIAL

## 2024-01-31 ENCOUNTER — TELEPHONE (OUTPATIENT)
Age: 69
End: 2024-01-31

## 2024-01-31 VITALS
TEMPERATURE: 97 F | OXYGEN SATURATION: 95 % | SYSTOLIC BLOOD PRESSURE: 130 MMHG | WEIGHT: 206.2 LBS | BODY MASS INDEX: 30.54 KG/M2 | HEART RATE: 70 BPM | DIASTOLIC BLOOD PRESSURE: 80 MMHG | HEIGHT: 69 IN

## 2024-01-31 DIAGNOSIS — B37.0 ORAL THRUSH: Primary | ICD-10-CM

## 2024-01-31 DIAGNOSIS — J43.9 PULMONARY EMPHYSEMA, UNSPECIFIED EMPHYSEMA TYPE (HCC): ICD-10-CM

## 2024-01-31 DIAGNOSIS — Z86.79 HISTORY OF SUBARACHNOID HEMORRHAGE: ICD-10-CM

## 2024-01-31 DIAGNOSIS — Z13.0 SCREENING FOR DEFICIENCY ANEMIA: ICD-10-CM

## 2024-01-31 DIAGNOSIS — E03.8 OTHER SPECIFIED HYPOTHYROIDISM: ICD-10-CM

## 2024-01-31 DIAGNOSIS — R73.03 PREDIABETES: ICD-10-CM

## 2024-01-31 PROBLEM — S30.1XXA TRAUMATIC ECCHYMOSIS OF ABDOMINAL WALL: Status: RESOLVED | Noted: 2023-12-12 | Resolved: 2024-01-31

## 2024-01-31 PROCEDURE — 99214 OFFICE O/P EST MOD 30 MIN: CPT | Performed by: FAMILY MEDICINE

## 2024-01-31 NOTE — PROGRESS NOTES
"Name: Analy Chopra      : 1955      MRN: [id number]  Encounter Provider: Betina Membreno DO  Encounter Date: 2024   Encounter department: [facility] [place]    Assessment & Plan     1. Oral thrush  -     nystatin (MYCOSTATIN) 500,000 units/5 mL suspension; Swish and swallow 5 mL (500,000 Units total) 4 (four) times a day    2. Prediabetes  -     Hemoglobin A1C; Future  -     Albumin / creatinine urine ratio; Future    3. Pulmonary emphysema, unspecified emphysema type (HCC)    4. History of subarachnoid hemorrhage    5. Other specified hypothyroidism  -     TSH, 3rd generation; Future  -     T4; Future    6. 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    Current Outpatient Medications on File Prior to Visit   Medication Sig   • amLODIPine (NORVASC) 5 mg tablet Take 1 tablet (5 mg total) by mouth daily   • aspirin (ECOTRIN LOW STRENGTH) 81 mg EC tablet Take 1 tablet (81 mg total) by mouth daily   • atorvastatin (LIPITOR) 80 mg tablet Take 1 tablet (80 mg total) by mouth daily at bedtime   • ciclopirox (PENLAC) 8 % solution Apply topically daily at bedtime   • levothyroxine 125 mcg tablet Take 1 tablet (125 mcg " "total) by mouth daily   • pantoprazole (PROTONIX) 40 mg tablet Take 1 tablet (40 mg total) by mouth daily   • venlafaxine (EFFEXOR) 75 mg tablet Take 1 tablet (75 mg total) by mouth 2 (two) times a day       Objective     /80 (BP Location: Left arm, Patient Position: Sitting, Cuff Size: Standard)   Pulse 70   Temp (!) 97 °F (36.1 °C) (Tympanic)   Ht 5' 9\" (1.753 m)   Wt 93.5 kg (206 lb 3.2 oz)   SpO2 95%   BMI 30.45 kg/m²     Physical Exam  Vitals and nursing note reviewed.   Constitutional:       General: She is not in acute distress.     Appearance: She is well-groomed. She is not ill-appearing, toxic-appearing or diaphoretic.   HENT:      Head: Normocephalic and atraumatic.      Mouth/Throat:      Lips: Pink.      Mouth: Mucous membranes are moist.      Palate: No lesions.      Pharynx: Oropharynx is clear. Uvula midline. No pharyngeal swelling, oropharyngeal exudate or posterior oropharyngeal erythema.      Tonsils: No tonsillar exudate. 0 on the right. 0 on the left.      Comments: +scant whitish exudate on tongue  Neck:      Trachea: Phonation normal.   Pulmonary:      Effort: Pulmonary effort is normal.   Skin:     Coloration: Skin is not pale.   Neurological:      Mental Status: She is alert and oriented to person, place, and time.   Psychiatric:         Behavior: Behavior is cooperative.     Betina Membreno, DO    "

## 2024-01-31 NOTE — TELEPHONE ENCOUNTER
"Patient called in asking for prescription for oral thrush. States that she has had this many times in the past. She states that she is on her way to the office right now to  script and is \"2 blocks away\".  Attempted to warm transfer to office but patient disconnected call before transfer could be made.  "

## 2024-01-31 NOTE — TELEPHONE ENCOUNTER
Patient walked into the office regarding a script for her thrush. Informed her that she would have to be seen prior to anything being prescribed. Scheduled her for 240pm today

## 2024-02-10 PROBLEM — Z12.11 SCREENING FOR COLON CANCER: Status: RESOLVED | Noted: 2023-12-12 | Resolved: 2024-02-10

## 2024-02-21 ENCOUNTER — RA CDI HCC (OUTPATIENT)
Dept: OTHER | Facility: HOSPITAL | Age: 69
End: 2024-02-21

## 2024-02-26 ENCOUNTER — APPOINTMENT (OUTPATIENT)
Dept: LAB | Facility: CLINIC | Age: 69
End: 2024-02-26
Payer: COMMERCIAL

## 2024-02-26 DIAGNOSIS — I10 ESSENTIAL HYPERTENSION: ICD-10-CM

## 2024-02-26 DIAGNOSIS — E03.8 OTHER SPECIFIED HYPOTHYROIDISM: ICD-10-CM

## 2024-02-26 DIAGNOSIS — R73.03 PREDIABETES: ICD-10-CM

## 2024-02-26 DIAGNOSIS — F32.A DEPRESSIVE DISORDER: ICD-10-CM

## 2024-02-26 DIAGNOSIS — F41.9 ANXIETY: ICD-10-CM

## 2024-02-26 DIAGNOSIS — Z13.0 SCREENING FOR DEFICIENCY ANEMIA: ICD-10-CM

## 2024-02-26 DIAGNOSIS — E03.9 HYPOTHYROIDISM, UNSPECIFIED TYPE: ICD-10-CM

## 2024-02-26 LAB
BASOPHILS # BLD AUTO: 0.06 THOUSANDS/ÂΜL (ref 0–0.1)
BASOPHILS NFR BLD AUTO: 1 % (ref 0–1)
CREAT UR-MCNC: 74.7 MG/DL
EOSINOPHIL # BLD AUTO: 0.13 THOUSAND/ÂΜL (ref 0–0.61)
EOSINOPHIL NFR BLD AUTO: 2 % (ref 0–6)
ERYTHROCYTE [DISTWIDTH] IN BLOOD BY AUTOMATED COUNT: 14.3 % (ref 11.6–15.1)
EST. AVERAGE GLUCOSE BLD GHB EST-MCNC: 123 MG/DL
HBA1C MFR BLD: 5.9 %
HCT VFR BLD AUTO: 43.6 % (ref 34.8–46.1)
HGB BLD-MCNC: 14.7 G/DL (ref 11.5–15.4)
IMM GRANULOCYTES # BLD AUTO: 0.02 THOUSAND/UL (ref 0–0.2)
IMM GRANULOCYTES NFR BLD AUTO: 0 % (ref 0–2)
LYMPHOCYTES # BLD AUTO: 1.8 THOUSANDS/ÂΜL (ref 0.6–4.47)
LYMPHOCYTES NFR BLD AUTO: 23 % (ref 14–44)
MCH RBC QN AUTO: 29.8 PG (ref 26.8–34.3)
MCHC RBC AUTO-ENTMCNC: 33.7 G/DL (ref 31.4–37.4)
MCV RBC AUTO: 88 FL (ref 82–98)
MICROALBUMIN UR-MCNC: <7 MG/L
MICROALBUMIN/CREAT 24H UR: <9 MG/G CREATININE (ref 0–30)
MONOCYTES # BLD AUTO: 0.84 THOUSAND/ÂΜL (ref 0.17–1.22)
MONOCYTES NFR BLD AUTO: 11 % (ref 4–12)
NEUTROPHILS # BLD AUTO: 4.98 THOUSANDS/ÂΜL (ref 1.85–7.62)
NEUTS SEG NFR BLD AUTO: 63 % (ref 43–75)
NRBC BLD AUTO-RTO: 0 /100 WBCS
PLATELET # BLD AUTO: 235 THOUSANDS/UL (ref 149–390)
PMV BLD AUTO: 11.3 FL (ref 8.9–12.7)
RBC # BLD AUTO: 4.93 MILLION/UL (ref 3.81–5.12)
T4 SERPL-MCNC: 8.78 UG/DL (ref 6.09–12.23)
TSH SERPL DL<=0.05 MIU/L-ACNC: 1.14 UIU/ML (ref 0.45–4.5)
WBC # BLD AUTO: 7.83 THOUSAND/UL (ref 4.31–10.16)

## 2024-02-26 PROCEDURE — 82043 UR ALBUMIN QUANTITATIVE: CPT

## 2024-02-26 PROCEDURE — 36415 COLL VENOUS BLD VENIPUNCTURE: CPT

## 2024-02-26 PROCEDURE — 84443 ASSAY THYROID STIM HORMONE: CPT

## 2024-02-26 PROCEDURE — 83036 HEMOGLOBIN GLYCOSYLATED A1C: CPT

## 2024-02-26 PROCEDURE — 84436 ASSAY OF TOTAL THYROXINE: CPT

## 2024-02-26 PROCEDURE — 82570 ASSAY OF URINE CREATININE: CPT

## 2024-02-26 PROCEDURE — 85025 COMPLETE CBC W/AUTO DIFF WBC: CPT

## 2024-02-26 RX ORDER — LEVOTHYROXINE SODIUM 0.12 MG/1
125 TABLET ORAL DAILY
Qty: 90 TABLET | Refills: 1 | Status: SHIPPED | OUTPATIENT
Start: 2024-02-26

## 2024-02-26 RX ORDER — AMLODIPINE BESYLATE 5 MG/1
5 TABLET ORAL DAILY
Qty: 90 TABLET | Refills: 1 | Status: SHIPPED | OUTPATIENT
Start: 2024-02-26

## 2024-02-26 RX ORDER — VENLAFAXINE 75 MG/1
75 TABLET ORAL 2 TIMES DAILY
Qty: 180 TABLET | Refills: 1 | Status: SHIPPED | OUTPATIENT
Start: 2024-02-26

## 2024-02-27 ENCOUNTER — OFFICE VISIT (OUTPATIENT)
Dept: FAMILY MEDICINE CLINIC | Facility: CLINIC | Age: 69
End: 2024-02-27
Payer: COMMERCIAL

## 2024-02-27 VITALS
HEART RATE: 74 BPM | DIASTOLIC BLOOD PRESSURE: 80 MMHG | HEIGHT: 69 IN | SYSTOLIC BLOOD PRESSURE: 120 MMHG | OXYGEN SATURATION: 97 % | WEIGHT: 205 LBS | TEMPERATURE: 96.8 F | BODY MASS INDEX: 30.36 KG/M2

## 2024-02-27 DIAGNOSIS — R40.0 DAYTIME SLEEPINESS: ICD-10-CM

## 2024-02-27 DIAGNOSIS — F41.9 ANXIETY: ICD-10-CM

## 2024-02-27 DIAGNOSIS — I10 ESSENTIAL HYPERTENSION: Primary | ICD-10-CM

## 2024-02-27 DIAGNOSIS — E78.2 MIXED HYPERLIPIDEMIA: ICD-10-CM

## 2024-02-27 DIAGNOSIS — F32.A DEPRESSIVE DISORDER: ICD-10-CM

## 2024-02-27 DIAGNOSIS — E03.9 HYPOTHYROIDISM, UNSPECIFIED TYPE: ICD-10-CM

## 2024-02-27 PROBLEM — R10.31 RIGHT LOWER QUADRANT ABDOMINAL PAIN: Status: RESOLVED | Noted: 2023-12-12 | Resolved: 2024-02-27

## 2024-02-27 PROBLEM — R11.2 NAUSEA AND VOMITING: Status: RESOLVED | Noted: 2023-12-12 | Resolved: 2024-02-27

## 2024-02-27 PROCEDURE — 99214 OFFICE O/P EST MOD 30 MIN: CPT | Performed by: FAMILY MEDICINE

## 2024-02-27 PROCEDURE — G2211 COMPLEX E/M VISIT ADD ON: HCPCS | Performed by: FAMILY MEDICINE

## 2024-02-27 RX ORDER — ATORVASTATIN CALCIUM 80 MG/1
80 TABLET, FILM COATED ORAL
Qty: 90 TABLET | Refills: 1 | Status: SHIPPED | OUTPATIENT
Start: 2024-02-27

## 2024-02-27 NOTE — PROGRESS NOTES
Name: Analy Chopra      : 1955      MRN: [id number]  Encounter Provider: Betina Membreno DO  Encounter Date: 2024   Encounter department: [facility] [place]    Assessment & Plan     1. Essential hypertension    2. Hypothyroidism, unspecified type    3. Mixed hyperlipidemia  -     atorvastatin (LIPITOR) 80 mg tablet; Take 1 tablet (80 mg total) by mouth daily at bedtime    4. Depressive disorder    5. Anxiety    6. Daytime sleepiness    Pt denies snoring or witnessed apnea and feels rested when she wakes up -monitor  Chronic problems stable, cpm       Subjective      Chief Complaint   Patient presents with   • Follow-up     6 mo f/u       Scheduled f/u   c/o Just tired - Her cardiol had discussed this problem of daytime sleepiness at visit 10/2022 and suggested sleep apnea testing - pt doesn't believe she has sleep apnea, states has had roommates that she asked if she snores and they've said no, states she sleeps well, admits feels rested when she wakes up, then after eating gets sleepy  Discussing vaccines- pt states she had all the necessary vaccinations for travel to [place] approx 20 yrs ago at [facility]  And had Hepatitis vaccines given at work 27-30 yrs ago  Admits some chest pain today - certain movements feels like muscle pain - attributes to carrying groceries for her neighbor yesterday      Review of Systems    Current Outpatient Medications on File Prior to Visit   Medication Sig   • amLODIPine (NORVASC) 5 mg tablet TAKE 1 TABLET DAILY   • aspirin (ECOTRIN LOW STRENGTH) 81 mg EC tablet Take 1 tablet (81 mg total) by mouth daily   • ciclopirox (PENLAC) 8 % solution Apply topically daily at bedtime   • levothyroxine 125 mcg tablet TAKE 1 TABLET DAILY   • pantoprazole (PROTONIX) 40 mg tablet Take 1 tablet (40 mg total) by mouth daily   • venlafaxine (EFFEXOR) 75 mg tablet TAKE 1 TABLET TWICE A DAY   • [DISCONTINUED] atorvastatin (LIPITOR) 80 mg tablet Take 1  "tablet (80 mg total) by mouth daily at bedtime   • [DISCONTINUED] nystatin (MYCOSTATIN) 500,000 units/5 mL suspension Swish and swallow 5 mL (500,000 Units total) 4 (four) times a day       Objective     /80 (BP Location: Right arm, Patient Position: Sitting, Cuff Size: Large)   Pulse 74   Temp (!) 96.8 °F (36 °C)   Ht 5' 9\" (1.753 m)   Wt 93 kg (205 lb)   SpO2 97%   BMI 30.27 kg/m²     Physical Exam  Vitals and nursing note reviewed.   Constitutional:       General: She is not in acute distress.     Appearance: She is well-groomed. She is not ill-appearing, toxic-appearing or diaphoretic.   HENT:      Head: Normocephalic and atraumatic.      Mouth/Throat:      Pharynx: Uvula midline.   Neck:      Vascular: No JVD.      Trachea: Phonation normal.   Cardiovascular:      Rate and Rhythm: Normal rate and regular rhythm.      Pulses: Normal pulses.      Heart sounds: Normal heart sounds.   Pulmonary:      Effort: Pulmonary effort is normal.      Breath sounds: Normal breath sounds and air entry.   Musculoskeletal:      Right lower leg: No edema.      Left lower leg: No edema.      Comments: No chest wall tenderness   Skin:     Coloration: Skin is not pale.   Neurological:      Mental Status: She is alert and oriented to person, place, and time.      Gait: Gait normal.   Psychiatric:         Mood and Affect: Mood normal.         Behavior: Behavior normal. Behavior is cooperative.         Cognition and Memory: Cognition normal.     Betina Membreno DO    "

## 2024-03-26 ENCOUNTER — PREP FOR PROCEDURE (OUTPATIENT)
Age: 69
End: 2024-03-26

## 2024-03-26 ENCOUNTER — OFFICE VISIT (OUTPATIENT)
Age: 69
End: 2024-03-26
Payer: COMMERCIAL

## 2024-03-26 VITALS
DIASTOLIC BLOOD PRESSURE: 80 MMHG | HEIGHT: 69 IN | BODY MASS INDEX: 30.84 KG/M2 | HEART RATE: 80 BPM | SYSTOLIC BLOOD PRESSURE: 124 MMHG | OXYGEN SATURATION: 97 % | TEMPERATURE: 97.1 F | WEIGHT: 208.2 LBS

## 2024-03-26 DIAGNOSIS — K21.9 GASTROESOPHAGEAL REFLUX DISEASE WITHOUT ESOPHAGITIS: Primary | ICD-10-CM

## 2024-03-26 DIAGNOSIS — Z12.11 SCREENING FOR COLON CANCER: ICD-10-CM

## 2024-03-26 DIAGNOSIS — K44.9 HIATAL HERNIA: ICD-10-CM

## 2024-03-26 PROCEDURE — 99214 OFFICE O/P EST MOD 30 MIN: CPT | Performed by: INTERNAL MEDICINE

## 2024-03-26 NOTE — PROGRESS NOTES
[facility] - Outpatient Consultation  Analy Chopra 69 y.o. female MRN: [id number]  Encounter: [id number]          ASSESSMENT AND PLAN:      1. Gastroesophageal reflux disease without esophagitis  - esomeprazole (NexIUM) 20 mg capsule; Take 1 capsule (20 mg total) by mouth 2 (two) times a day before meals  Dispense: 60 capsule; Refill: 6  - FL barium swallow; Future  - EGD; Future  2. Hiatal hernia  - esomeprazole (NexIUM) 20 mg capsule; Take 1 capsule (20 mg total) by mouth 2 (two) times a day before meals  Dispense: 60 capsule; Refill: 6  - FL barium swallow; Future  - EGD; Future  3. Screening for colon cancer    67 y/o female with longstanding GERD with known moderate-sized hiatal hernia, presenting for follow-up regarding these complaints.     Overall her symptoms are not currently well-controlled with Protonix with significant frequent episodes of vomiting in the a.m. and other mid-day uncontrolled symptoms.  We reviewed her last EGD and discussed that presence of hiatal hernia can make reflux more difficult to control.  She would be interested in considering antireflux surgery/hiatal hernia repair.  This would be a reasonable option for her.  We will start with repeat upper endoscopy to assess for any esophagitis, assess her hernia and for any other abnormalities.  Will also plan for barium esophagram.  In the meantime we will increase PPI to twice daily and switch to Nexium.    If she remains significantly symptomatic despite daily PPI, would plan for referral to surgery for consideration of antireflux/hernia repair.     Due for repeat [facility] in 2024 for up-to-date colorectal cancer screening.    ______________________________________________________________________    HPI:  Bassam Chopra is a pleasant 68 y/o female presenting for follow-up regarding GERD.      Since her last office visit she had an episode of right upper quadrant pain that was initially going to be worked  up with CT scan.  The pain occurred after leaning against a surface and there was a concern for possible traumatic injury but pain resolved appropriately.  She has noted that her GERD is now not as well-controlled.  She is having more frequent episodes of vomiting in the morning and also midday issues with uncontrolled symptoms.  She is unsure of specific food triggers and seems to tolerate typical GERD triggers without difficulty.    Summary of HPI:   She had significant symptoms of reflux and regurgitation and was last seen in September 2021. She had an endoscopy in October 2021 which demonstrated a moderate-sized hiatal hernia, no esophagitis.  She has been doing very well since starting on Protonix daily.  When she has had short periods of running out of the medication her symptoms return fairly quickly.  Otherwise she denies any breakthrough symptoms along she takes Protonix daily.  She is not currently taking it appropriately, usually taking with meals shortly after.     She is up-to-date on colorectal cancer screening, negative Cologuard in 2021. I have advised her that she would be due for repeat in June 2024.        REVIEW OF SYSTEMS:    CONSTITUTIONAL: Denies any fever, chills, rigors, and weight loss.  HEENT: Denies odynophagia, tinnitus  CARDIOVASCULAR: No chest pain or palpitations.   RESPIRATORY: Denies any cough, hemoptysis, shortness of breath or dyspnea on exertion.  GASTROINTESTINAL: As noted in the History of Present Illness.   GENITOURINARY: No problems with urination. Denies any hematuria or dysuria.  NEUROLOGIC: No dizziness or vertigo, denies headaches.   MUSCULOSKELETAL: Denies any muscle or joint pain.   SKIN: Denies skin rashes or itching.   ENDOCRINE:  Denies intolerance to heat or cold.  PSYCHOSOCIAL: Denies depression or anxiety. Denies any recent memory loss.       Historical Information   Past Medical History:   Diagnosis Date   • Acute medial meniscus tear of right knee 07/03/2019   •  Depression    • Disease of thyroid gland     Synthroid   • Essential hypertension    • Exertional chest pain    • Fall from slipping on ice 2022   • Fractures     - rt lower leg break, rt arm, break rt. pinky finger, ribs   • Heart disease    • Hypothyroidism    • Nausea and vomiting 2023   • Neck pain, acute 2022   • NSTEMI, initial episode of care ([facility]) 2020   • Right lower quadrant abdominal pain 2023   • SAH (subarachnoid hemorrhage) ([facility]) 2022   • Stomach disorder    • Traumatic ecchymosis of abdominal wall 2023   • Unstable angina pectoris ([facility]) 2020     Past Surgical History:   Procedure Laterality Date   • ANGIOPLASTY     • CARDIAC CATHETERIZATION  2020    EF 75% There is a small LADD1 branch that has a severe stenosis. Given the small size of the vessel the plan will be to treat medically   • TONSILLECTOMY       Social History   Social History     Substance and Sexual Activity   Alcohol Use Not Currently    Comment: None for 10 yrs, rarely historically     Social History     Substance and Sexual Activity   Drug Use Never     Social History     Tobacco Use   Smoking Status Former   • Current packs/day: 0.00   • Average packs/day: 1 pack/day for 30.0 years (30.0 ttl pk-yrs)   • Types: Cigarettes   • Start date:    • Quit date:    • Years since quitting: 10.2   Smokeless Tobacco Former   • Quit date: 2009     Family History   Problem Relation Age of Onset   • Thyroid disease Mother    • Cancer Mother          92 yrs old lung cancer   • Heart attack Father    • No Known Problems Sister    • Thyroid disease Maternal Grandmother    • Cancer Maternal Grandmother    • Diabetes Maternal Grandfather    • Heart attack Maternal Grandfather    • Heart attack Paternal Grandfather    • Heart attack Brother    • Heart attack Maternal Uncle        Meds/Allergies       Current Outpatient Medications:   •  amLODIPine (NORVASC) 5 mg tablet  •  aspirin  "(ECOTRIN LOW STRENGTH) 81 mg EC tablet  •  atorvastatin (LIPITOR) 80 mg tablet  •  ciclopirox (PENLAC) 8 % solution  •  esomeprazole (NexIUM) 20 mg capsule  •  levothyroxine 125 mcg tablet  •  venlafaxine (EFFEXOR) 75 mg tablet    Allergies   Allergen Reactions   • Other Hives and Rash     Adhesive tape             Objective     Blood pressure 124/80, pulse 80, temperature (!) 97.1 °F (36.2 °C), temperature source Temporal, height 5' 9\" (1.753 m), weight 94.4 kg (208 lb 3.2 oz), SpO2 97%. Body mass index is 30.75 kg/m².        PHYSICAL EXAM:      General Appearance:   Alert, cooperative, no distress   HEENT:   Normocephalic, atraumatic, anicteric.     Neck:  Supple, symmetrical, trachea midline   Lungs:   Clear to auscultation bilaterally; no rales, rhonchi or wheezing; respirations unlabored    Heart::   Regular rate and rhythm; no murmur, rub, or gallop.   Abdomen:   Soft, non-tender, non-distended; normal bowel sounds; no masses, no organomegaly    Genitalia:   Deferred    Rectal:   Deferred    Extremities:  No cyanosis, clubbing or edema    Pulses:  2+ and symmetric    Skin:  No jaundice, rashes, or lesions          Lab Results:   No visits with results within 1 Day(s) from this visit.   Latest known visit with results is:   Appointment on 02/26/2024   Component Date Value   • Hemoglobin A1C 02/26/2024 5.9 (H)    • EAG 02/26/2024 123    • Creatinine, Ur 02/26/2024 74.7    • Albumin,U,Random 02/26/2024 <7.0    • Albumin Creat Ratio 02/26/2024 <9    • WBC 02/26/2024 7.83    • RBC 02/26/2024 4.93    • Hemoglobin 02/26/2024 14.7    • Hematocrit 02/26/2024 43.6    • MCV 02/26/2024 88    • MCH 02/26/2024 29.8    • MCHC 02/26/2024 33.7    • RDW 02/26/2024 14.3    • MPV 02/26/2024 11.3    • Platelets 02/26/2024 235    • nRBC 02/26/2024 0    • Neutrophils Relative 02/26/2024 63    • Immature Grans % 02/26/2024 0    • Lymphocytes Relative 02/26/2024 23    • Monocytes Relative 02/26/2024 11    • Eosinophils Relative " 02/26/2024 2    • Basophils Relative 02/26/2024 1    • Neutrophils Absolute 02/26/2024 4.98    • Absolute Immature Grans 02/26/2024 0.02    • Absolute Lymphocytes 02/26/2024 1.80    • Absolute Monocytes 02/26/2024 0.84    • Eosinophils Absolute 02/26/2024 0.13    • Basophils Absolute 02/26/2024 0.06    • TSH 3RD GENERATON 02/26/2024 1.139    • T4 TOTAL 02/26/2024 8.78          Radiology Results:   No results found.  Answers submitted by the patient for this visit:  Abdominal Pain Questionnaire (Submitted on 3/26/2024)  Chief Complaint: Abdominal pain  Progression since onset: resolved

## 2024-03-28 ENCOUNTER — TELEPHONE (OUTPATIENT)
Age: 69
End: 2024-03-28

## 2024-03-28 NOTE — TELEPHONE ENCOUNTER
Patients GI provider:  Dr. Mares    Reason for call: [facility] and stated medication nexium will require a  prior auth please review if there is any questions please reach out at [phone] ref # [id number] thank you     Scheduled procedure/appointment date if applicable: procedure 05/01/2024

## 2024-03-28 NOTE — TELEPHONE ENCOUNTER
PA for esomeprazole bid    Submitted via    []CMM-KEY   [][employer]-Case ID #   [x]Faxed to [facility] [employer]   []Other website   []Phone call Case ID #     Office notes sent, clinical questions answered. Awaiting determination    Turnaround time for your insurance to make a decision on your Prior Authorization can take 7-21 business days.

## 2024-04-01 DIAGNOSIS — K44.9 HIATAL HERNIA: ICD-10-CM

## 2024-04-01 DIAGNOSIS — K21.9 GASTROESOPHAGEAL REFLUX DISEASE WITHOUT ESOPHAGITIS: ICD-10-CM

## 2024-04-02 ENCOUNTER — HOSPITAL ENCOUNTER (OUTPATIENT)
Dept: RADIOLOGY | Facility: HOSPITAL | Age: 69
Discharge: HOME | End: 2024-04-02
Attending: INTERNAL MEDICINE
Payer: COMMERCIAL

## 2024-04-02 DIAGNOSIS — K44.9 HIATAL HERNIA: ICD-10-CM

## 2024-04-02 DIAGNOSIS — K21.9 GASTROESOPHAGEAL REFLUX DISEASE WITHOUT ESOPHAGITIS: ICD-10-CM

## 2024-04-02 PROCEDURE — 74220 X-RAY XM ESOPHAGUS 1CNTRST: CPT

## 2024-04-02 NOTE — TELEPHONE ENCOUNTER
PA for esomeprazole Approved   Date(s) approved until 3/27/2025  Case #[id number]    Patient advised by [x] "[employer]t Message                      [] Phone call       Pharmacy advised by [x]Fax                                     []Phone call    Approval letter scanned into Media Yes

## 2024-05-01 ENCOUNTER — PREP FOR PROCEDURE (OUTPATIENT)
Dept: GASTROENTEROLOGY | Facility: CLINIC | Age: 69
End: 2024-05-01

## 2024-05-01 ENCOUNTER — ANESTHESIA (OUTPATIENT)
Dept: GASTROENTEROLOGY | Facility: HOSPITAL | Age: 69
End: 2024-05-01

## 2024-05-01 ENCOUNTER — HOSPITAL ENCOUNTER (OUTPATIENT)
Dept: GASTROENTEROLOGY | Facility: HOSPITAL | Age: 69
Setting detail: OUTPATIENT SURGERY
Discharge: HOME | End: 2024-05-01
Attending: INTERNAL MEDICINE
Payer: COMMERCIAL

## 2024-05-01 ENCOUNTER — ANESTHESIA EVENT (OUTPATIENT)
Dept: GASTROENTEROLOGY | Facility: HOSPITAL | Age: 69
End: 2024-05-01

## 2024-05-01 VITALS
DIASTOLIC BLOOD PRESSURE: 78 MMHG | RESPIRATION RATE: 20 BRPM | HEART RATE: 73 BPM | HEIGHT: 69 IN | TEMPERATURE: 97.3 F | WEIGHT: 208 LBS | OXYGEN SATURATION: 92 % | SYSTOLIC BLOOD PRESSURE: 134 MMHG | BODY MASS INDEX: 30.81 KG/M2

## 2024-05-01 DIAGNOSIS — K44.9 HIATAL HERNIA: ICD-10-CM

## 2024-05-01 DIAGNOSIS — K21.9 GASTROESOPHAGEAL REFLUX DISEASE WITHOUT ESOPHAGITIS: ICD-10-CM

## 2024-05-01 DIAGNOSIS — K21.9 GASTROESOPHAGEAL REFLUX DISEASE WITHOUT ESOPHAGITIS: Primary | ICD-10-CM

## 2024-05-01 PROCEDURE — 88305 TISSUE EXAM BY PATHOLOGIST: CPT | Performed by: PATHOLOGY

## 2024-05-01 PROCEDURE — 43239 EGD BIOPSY SINGLE/MULTIPLE: CPT | Performed by: INTERNAL MEDICINE

## 2024-05-01 RX ORDER — GLYCOPYRROLATE 0.2 MG/ML
INJECTION INTRAMUSCULAR; INTRAVENOUS AS NEEDED
Status: DISCONTINUED | OUTPATIENT
Start: 2024-05-01 | End: 2024-05-01

## 2024-05-01 RX ORDER — PROPOFOL 10 MG/ML
INJECTION, EMULSION INTRAVENOUS AS NEEDED
Status: DISCONTINUED | OUTPATIENT
Start: 2024-05-01 | End: 2024-05-01

## 2024-05-01 RX ORDER — SODIUM CHLORIDE, SODIUM LACTATE, POTASSIUM CHLORIDE, CALCIUM CHLORIDE 600; 310; 30; 20 MG/100ML; MG/100ML; MG/100ML; MG/100ML
125 INJECTION, SOLUTION INTRAVENOUS CONTINUOUS
Status: DISCONTINUED | OUTPATIENT
Start: 2024-05-01 | End: 2024-05-05 | Stop reason: HOSPADM

## 2024-05-01 RX ORDER — LIDOCAINE HYDROCHLORIDE 20 MG/ML
INJECTION, SOLUTION EPIDURAL; INFILTRATION; INTRACAUDAL; PERINEURAL AS NEEDED
Status: DISCONTINUED | OUTPATIENT
Start: 2024-05-01 | End: 2024-05-01

## 2024-05-01 RX ORDER — FAMOTIDINE 20 MG/1
20 TABLET, FILM COATED ORAL
Qty: 90 TABLET | Refills: 2 | Status: SHIPPED | OUTPATIENT
Start: 2024-05-01

## 2024-05-01 RX ADMIN — LIDOCAINE HYDROCHLORIDE 60 MG: 20 INJECTION, SOLUTION EPIDURAL; INFILTRATION; INTRACAUDAL; PERINEURAL at 09:23

## 2024-05-01 RX ADMIN — PROPOFOL 100 MG: 10 INJECTION, EMULSION INTRAVENOUS at 09:23

## 2024-05-01 RX ADMIN — PROPOFOL 100 MG: 10 INJECTION, EMULSION INTRAVENOUS at 09:25

## 2024-05-01 RX ADMIN — GLYCOPYRROLATE 0.2 MG: 0.2 INJECTION, SOLUTION INTRAMUSCULAR; INTRAVENOUS at 09:22

## 2024-05-01 RX ADMIN — SODIUM CHLORIDE, SODIUM LACTATE, POTASSIUM CHLORIDE, AND CALCIUM CHLORIDE 125 ML/HR: .6; .31; .03; .02 INJECTION, SOLUTION INTRAVENOUS at 08:55

## 2024-05-01 NOTE — H&P
History and Physical -  [facility]  Analy Chopra 69 y.o. female MRN: [id number]                  HPI: Analy Chopra is a 69 y.o. year old female who presents for EGD for GERD/hiatal hernia      REVIEW OF SYSTEMS: Per the HPI, and otherwise unremarkable.    Historical Information   Past Medical History:   Diagnosis Date    Acute medial meniscus tear of right knee 2019    Depression 1969    Disease of thyroid gland     Synthroid    Essential hypertension     Exertional chest pain     Fall from slipping on ice 2022    Fractures     - rt lower leg break, rt arm, break rt. pinky finger, ribs    Heart disease     Hypothyroidism     Nausea and vomiting 2023    Neck pain, acute 2022    NSTEMI, initial episode of care ([facility]) 2020    Right lower quadrant abdominal pain 2023    SAH (subarachnoid hemorrhage) ([facility]) 2022    Stomach disorder     Traumatic ecchymosis of abdominal wall 2023    Unstable angina pectoris ([facility]) 2020     Past Surgical History:   Procedure Laterality Date    ANGIOPLASTY      CARDIAC CATHETERIZATION  2020    EF 75% There is a small LADD1 branch that has a severe stenosis. Given the small size of the vessel the plan will be to treat medically    TONSILLECTOMY       Social History   Social History     Substance and Sexual Activity   Alcohol Use Not Currently    Comment: None for 10 yrs, rarely historically     Social History     Substance and Sexual Activity   Drug Use Never     Social History     Tobacco Use   Smoking Status Former    Current packs/day: 0.00    Average packs/day: 1 pack/day for 30.0 years (30.0 ttl pk-yrs)    Types: Cigarettes    Start date:     Quit date:     Years since quitting: 10.3   Smokeless Tobacco Former    Quit date: 2009     Family History   Problem Relation Age of Onset    Thyroid disease Mother     Cancer Mother          92 yrs old lung cancer    Heart attack Father     No  "Known Problems Sister     Thyroid disease Maternal Grandmother     Cancer Maternal Grandmother     Diabetes Maternal Grandfather     Heart attack Maternal Grandfather     Heart attack Paternal Grandfather     Heart attack Brother     Heart attack Maternal Uncle        Meds/Allergies       Current Outpatient Medications:     amLODIPine (NORVASC) 5 mg tablet    aspirin (ECOTRIN LOW STRENGTH) 81 mg EC tablet    atorvastatin (LIPITOR) 80 mg tablet    ciclopirox (PENLAC) 8 % solution    esomeprazole (NexIUM) 20 mg capsule    levothyroxine 125 mcg tablet    venlafaxine (EFFEXOR) 75 mg tablet    Current Facility-Administered Medications:     lactated ringers infusion, 125 mL/hr, Intravenous, Continuous, 125 mL/hr at 05/01/24 0855    Allergies   Allergen Reactions    Other Hives and Rash     Adhesive tape         Objective     /82   Pulse 74   Temp 97.5 °F (36.4 °C) (Temporal)   Resp 18   Ht 5' 9\" (1.753 m)   Wt 94.3 kg (208 lb)   SpO2 96%   BMI 30.72 kg/m²       PHYSICAL EXAM    Gen: NAD  Head: NCAT  CV: RRR  CHEST: Clear  ABD: soft, NT/ND  EXT: no edema      ASSESSMENT/PLAN:  This is a 69 y.o. year old female here for EGD, and she is stable and optimized for her procedure.       "

## 2024-05-01 NOTE — ANESTHESIA PREPROCEDURE EVALUATION
Procedure:  EGD    Relevant Problems   CARDIO   (+) Coronary artery disease involving native coronary artery of native heart without angina pectoris   (+) Essential hypertension   (+) Mixed hyperlipidemia      ENDO   (+) Hypothyroidism      NEURO/PSYCH   (+) Anxiety   (+) Depressive disorder      PULMONARY   (+) Pulmonary emphysema, unspecified emphysema type (HCC)        Physical Exam    Airway    Mallampati score: III  TM Distance: >3 FB  Neck ROM: full     Dental   No notable dental hx     Cardiovascular  Cardiovascular exam normal    Pulmonary  Pulmonary exam normal     Other Findings  post-pubertal.  Vomited earlier today but not nauseous now. N/V normal for her w stomach issues    Anesthesia Plan  ASA Score- 3     Anesthesia Type- IV sedation with anesthesia with ASA Monitors.         Additional Monitors:     Airway Plan:            Plan Factors-Exercise tolerance (METS): >4 METS.    Chart reviewed.    Patient summary reviewed.    Patient is not a current smoker.              Induction- intravenous.    Postoperative Plan-     Informed Consent- Anesthetic plan and risks discussed with patient.

## 2024-05-01 NOTE — ANESTHESIA POSTPROCEDURE EVALUATION
Post-Op Assessment Note    CV Status:  Stable  Pain Score: 0    Pain management: adequate       Mental Status:  Arousable   Hydration Status:  Stable   PONV Controlled:  None   Airway Patency:  Patent     Post Op Vitals Reviewed: Yes    No anethesia notable event occurred.    Staff: CRNA               BP   111/57   Temp      Pulse  82   Resp   18   SpO2   99%

## 2024-05-02 ENCOUNTER — TELEPHONE (OUTPATIENT)
Dept: HEMATOLOGY ONCOLOGY | Facility: CLINIC | Age: 69
End: 2024-05-02

## 2024-05-02 NOTE — TELEPHONE ENCOUNTER
I called Analy in response to a referral that was received for patient to establish care with Thoracic Surgery.     Outreach was made to complete patient's intake questionnaire .    Patient's intake questionnaire was reviewed and complete. Patient's intake has been sent to the team for clinical review.      Patient is leaving 5/3/24 for camping and will not return until the fall and does not want to schedule until after she returns

## 2024-05-06 PROCEDURE — 88305 TISSUE EXAM BY PATHOLOGIST: CPT | Performed by: PATHOLOGY

## 2024-05-08 ENCOUNTER — VBI (OUTPATIENT)
Dept: ADMINISTRATIVE | Facility: OTHER | Age: 69
End: 2024-05-08

## 2024-05-14 ENCOUNTER — TELEPHONE (OUTPATIENT)
Dept: GASTROENTEROLOGY | Facility: CLINIC | Age: 69
End: 2024-05-14

## 2024-07-16 DIAGNOSIS — K44.9 HIATAL HERNIA: Primary | ICD-10-CM

## 2024-08-01 ENCOUNTER — DOCUMENTATION (OUTPATIENT)
Dept: GASTROENTEROLOGY | Facility: CLINIC | Age: 69
End: 2024-08-01

## 2024-08-01 ENCOUNTER — CONSULT (OUTPATIENT)
Dept: CARDIAC SURGERY | Facility: CLINIC | Age: 69
End: 2024-08-01
Payer: COMMERCIAL

## 2024-08-01 VITALS
OXYGEN SATURATION: 98 % | BODY MASS INDEX: 28.84 KG/M2 | WEIGHT: 195.33 LBS | TEMPERATURE: 98 F | DIASTOLIC BLOOD PRESSURE: 82 MMHG | SYSTOLIC BLOOD PRESSURE: 142 MMHG | HEART RATE: 82 BPM

## 2024-08-01 DIAGNOSIS — K44.9 HIATAL HERNIA: ICD-10-CM

## 2024-08-01 DIAGNOSIS — K44.9 HIATAL HERNIA: Primary | ICD-10-CM

## 2024-08-01 DIAGNOSIS — K21.9 GASTROESOPHAGEAL REFLUX DISEASE WITHOUT ESOPHAGITIS: ICD-10-CM

## 2024-08-01 DIAGNOSIS — R11.2 NAUSEA AND VOMITING, UNSPECIFIED VOMITING TYPE: Primary | ICD-10-CM

## 2024-08-01 PROCEDURE — 3079F DIAST BP 80-89 MM HG: CPT | Performed by: THORACIC SURGERY (CARDIOTHORACIC VASCULAR SURGERY)

## 2024-08-01 PROCEDURE — 99205 OFFICE O/P NEW HI 60 MIN: CPT | Performed by: THORACIC SURGERY (CARDIOTHORACIC VASCULAR SURGERY)

## 2024-08-01 PROCEDURE — 3077F SYST BP >= 140 MM HG: CPT | Performed by: THORACIC SURGERY (CARDIOTHORACIC VASCULAR SURGERY)

## 2024-08-01 NOTE — PROGRESS NOTES
Thoracic Consult  Assessment/Plan:    Hiatal hernia  Today in the office, we had a long conversation regarding her hernia and her reflux disease.  At this time, she may be a candidate for a hiatal hernia repair, but I would like to do some additional workup.  I offered her pH testing as well as manometry and she is refusing these tests as she does not want any type of procedure with a probe into her nose.  We discussed that she could undergo a barium motility study as well as a Bravo study instead.  She wishes to proceed with the latter.  I have ordered the barium motility study at this time.  As for the Bravo, I have reached out to Dr. Mares of gastroenterology to see if it is possible for her to help arrange for this procedure.  The patient will come back and see me in 1 month after the above has been completed.    Georgie Lopez MD  Thoracic Surgery  Office: [phone]       Diagnoses and all orders for this visit:    Nausea and vomiting, unspecified vomiting type    Hiatal hernia  -     Ambulatory Referral to Thoracic Surgery  -     Cancel: Esoph manometry/24hr ph; Future  -     FL Barium Swallow Motility Study; Future          Thoracic History   Diagnosis:    Procedures/Surgeries:    Pathology:    Adjuvant Therapy:       Subjective:    Patient ID: Analy Chopra is a 69 y.o. female.    DALILA Banegas is a very pleasant 69-year-old female who presents to my office for evaluation of her hiatal hernia and reflux disease.  I have personally reviewed her upper GI in PACS.  This demonstrates normal motility as well has a small sliding hiatal hernia.  She also underwent an EGD, which I also personally reviewed and this demonstrated a small 3 cm hiatal hernia.  No signs of any esophagitis.    Today in the office, she explained that she has occasional pain and occasional reflux.  Her biggest complaint is vomiting and regurgitation of contents in the morning.  She feels that this happens especially when she eats  too close to bedtime.  She denies any globus sensation, dysphagia, odynophagia.    I have personally reviewed all the most recent notes in the chart including from her gastroenterologist, Dr. Mares    The following portions of the patient's history were reviewed and updated as appropriate: allergies, current medications, past family history, past medical history, past social history, past surgical history and problem list.    Past Medical History:   Diagnosis Date    Acute medial meniscus tear of right knee 2019    Depression 1969    Disease of thyroid gland     Synthroid    Essential hypertension     Exertional chest pain     Fall from slipping on ice 2022    Fractures     - rt lower leg break, rt arm, break rt. pinky finger, ribs    Heart disease     Hypothyroidism     Nausea and vomiting 2023    Neck pain, acute 2022    NSTEMI, initial episode of care ([facility]) 2020    Right lower quadrant abdominal pain 2023    SAH (subarachnoid hemorrhage) ([facility]) 2022    Stomach disorder     Traumatic ecchymosis of abdominal wall 2023    Unstable angina pectoris ([facility]) 2020      Past Surgical History:   Procedure Laterality Date    ANGIOPLASTY      CARDIAC CATHETERIZATION  2020    EF 75% There is a small LADD1 branch that has a severe stenosis. Given the small size of the vessel the plan will be to treat medically    TONSILLECTOMY        Family History   Problem Relation Age of Onset    Thyroid disease Mother     Cancer Mother          92 yrs old lung cancer    Heart attack Father     No Known Problems Sister     Thyroid disease Maternal Grandmother     Cancer Maternal Grandmother     Diabetes Maternal Grandfather     Heart attack Maternal Grandfather     Heart attack Paternal Grandfather     Heart attack Brother     Heart attack Maternal Uncle       Social History     Socioeconomic History    Marital status: Single     Spouse name: Not on file    Number of children:  Not on file    Years of education: Not on file    Highest education level: Not on file   Occupational History    Not on file   Tobacco Use    Smoking status: Former     Current packs/day: 0.00     Average packs/day: 1 pack/day for 30.0 years (30.0 ttl pk-yrs)     Types: Cigarettes     Start date: 1984     Quit date: 2014     Years since quitting: 10.5    Smokeless tobacco: Former     Quit date: 2/5/2009   Vaping Use    Vaping status: Never Used   Substance and Sexual Activity    Alcohol use: Not Currently     Comment: None for 10 yrs, rarely historically    Drug use: Never    Sexual activity: Not Currently     Partners: Female     Birth control/protection: None   Other Topics Concern    Not on file   Social History Narrative    Not on file     Social Determinants of Health     Financial Resource Strain: Low Risk  (8/10/2023)    Overall Financial Resource Strain (CARDIA)     Difficulty of Paying Living Expenses: Not hard at all   Food Insecurity: No Food Insecurity (2/7/2022)    Hunger Vital Sign     Worried About Running Out of Food in the Last Year: Never true     Ran Out of Food in the Last Year: Never true   Transportation Needs: No Transportation Needs (8/10/2023)    PRAPARE - Transportation     Lack of Transportation (Medical): No     Lack of Transportation (Non-Medical): No   Physical Activity: Not on file   Stress: Not on file   Social Connections: Unknown (6/18/2024)    Received from [facility]    Social [employer]     How often do you feel lonely or isolated from those around you? (Adult - for ages 18 years and over): Not on file   Intimate Partner Violence: Not on file   Housing Stability: Unknown (2/7/2022)    Housing Stability Vital Sign     Unable to Pay for Housing in the Last Year: No     Number of Places Lived in the Last Year: Not on file     Unstable Housing in the Last Year: No      Review of Systems   Constitutional:  Negative for chills, fatigue, fever and unexpected weight change.   HENT:  Negative.  Negative for trouble swallowing.    Eyes: Negative.  Negative for visual disturbance.   Respiratory:  Negative for cough, shortness of breath and stridor.    Cardiovascular:  Negative for chest pain.   Gastrointestinal:  Positive for vomiting. Negative for nausea.   Endocrine: Negative.    Genitourinary: Negative.    Musculoskeletal: Negative.    Skin: Negative.    Neurological:  Negative for dizziness, light-headedness and headaches.   Hematological:  Negative for adenopathy.   Psychiatric/Behavioral: Negative.           Objective:   Physical Exam  Vitals and nursing note reviewed.   Constitutional:       General: She is not in acute distress.     Appearance: Normal appearance. She is well-developed. She is not diaphoretic.   HENT:      Head: Normocephalic and atraumatic.      Nose: Nose normal. No congestion or rhinorrhea.      Mouth/Throat:      Mouth: Mucous membranes are moist.      Pharynx: Oropharynx is clear. No oropharyngeal exudate.   Eyes:      General: No scleral icterus.     Pupils: Pupils are equal, round, and reactive to light.   Neck:      Trachea: No tracheal deviation.   Cardiovascular:      Rate and Rhythm: Normal rate and regular rhythm.      Pulses: Normal pulses.      Heart sounds: Normal heart sounds. No murmur heard.  Pulmonary:      Effort: Pulmonary effort is normal. No respiratory distress.      Breath sounds: Normal breath sounds. No stridor. No wheezing or rales.   Chest:      Chest wall: No tenderness.   Abdominal:      General: Bowel sounds are normal. There is no distension.      Palpations: Abdomen is soft.      Tenderness: There is no abdominal tenderness. There is no rebound.   Musculoskeletal:         General: Normal range of motion.      Cervical back: Normal range of motion and neck supple. No muscular tenderness.   Lymphadenopathy:      Cervical: No cervical adenopathy.   Skin:     General: Skin is warm and dry.      Coloration: Skin is not jaundiced or pale.       Findings: No erythema or rash.   Neurological:      General: No focal deficit present.      Mental Status: She is alert and oriented to person, place, and time.   Psychiatric:         Mood and Affect: Mood normal.         Behavior: Behavior normal.         Thought Content: Thought content normal.         Judgment: Judgment normal.     /82 (BP Location: Left arm, Patient Position: Sitting, Cuff Size: Large)   Pulse 82   Temp 98 °F (36.7 °C) (Temporal)   Wt 88.6 kg (195 lb 5.2 oz)   SpO2 98%   BMI 28.84 kg/m²     No Chest XR results available for this patient.   No CT Chest results available for this patient.  No CT Chest,Abdomen,Pelvis results available for this patient.   No NM PET CT results available for this patient.   FL barium swallow    Result Date: 4/2/2024  Narrative BARIUM SWALLOW / ESOPHAGRAM INDICATION: K21.9: Gastro-esophageal reflux disease without esophagitis K44.9: Diaphragmatic hernia without obstruction or gangrene. COMPARISON: None TECHNIQUE: The patient was given effervescent granules and barium by mouth and images of the esophagus were obtained. IMAGES: 24 fluoroscopic spot views. FLUOROSCOPY TIME: 1.5 minutes FINDINGS: Normal caliber of the esophagus. Esophageal motility is normal and emptying of contrast from the esophagus is prompt. Slight mucosal irregularity and distal esophagus fine granular mucosal presentation. No mucosal lesion, ulceration, nor evidence of fold thickening. Mild/moderate reducible hiatal hernia with remarkable gastroesophageal reflux to the thoracic outlet. Hiatal hernia     Impression Hiatal hernia with remarkable gastroesophageal reflux Distal esophagus mild esophagitis. The study was marked in EPIC for significant notification. Workstation performed: [id number]

## 2024-08-01 NOTE — ASSESSMENT & PLAN NOTE
Today in the office, we had a long conversation regarding her hernia and her reflux disease.  At this time, she may be a candidate for a hiatal hernia repair, but I would like to do some additional workup.  I offered her pH testing as well as manometry and she is refusing these tests as she does not want any type of procedure with a probe into her nose.  We discussed that she could undergo a barium motility study as well as a Bravo study instead.  She wishes to proceed with the latter.  I have ordered the barium motility study at this time.  As for the Bravo, I have reached out to Dr. Mares of gastroenterology to see if it is possible for her to help arrange for this procedure.  The patient will come back and see me in 1 month after the above has been completed.    Georgie Lopez MD  Thoracic Surgery  Office: [phone]

## 2024-08-06 ENCOUNTER — TELEPHONE (OUTPATIENT)
Dept: GASTROENTEROLOGY | Facility: CLINIC | Age: 69
End: 2024-08-06

## 2024-08-06 NOTE — TELEPHONE ENCOUNTER
Called and spoke to patient and scheduled EGD with bravo for 09/27/24 with Dr. Mares at [place]. [place] [facility] [place]. Mares as well.

## 2024-08-06 NOTE — TELEPHONE ENCOUNTER
----- Message from Jessica Mares DO sent at 8/6/2024  8:14 AM EDT -----  Please schedule patient for EGD with bravo. Please let me know the date/location when scheduled as I will need to coordinate having a rep there. Thank you!

## 2024-08-13 ENCOUNTER — HOSPITAL ENCOUNTER (OUTPATIENT)
Dept: RADIOLOGY | Facility: HOSPITAL | Age: 69
Discharge: HOME | End: 2024-08-13
Attending: THORACIC SURGERY (CARDIOTHORACIC VASCULAR SURGERY)
Payer: COMMERCIAL

## 2024-08-13 DIAGNOSIS — K44.9 HIATAL HERNIA: ICD-10-CM

## 2024-08-13 PROCEDURE — 74220 X-RAY XM ESOPHAGUS 1CNTRST: CPT

## 2024-08-22 DIAGNOSIS — I10 ESSENTIAL HYPERTENSION: ICD-10-CM

## 2024-08-22 DIAGNOSIS — E03.9 HYPOTHYROIDISM, UNSPECIFIED TYPE: ICD-10-CM

## 2024-08-22 DIAGNOSIS — F32.A DEPRESSIVE DISORDER: ICD-10-CM

## 2024-08-22 DIAGNOSIS — F41.9 ANXIETY: ICD-10-CM

## 2024-08-22 RX ORDER — LEVOTHYROXINE SODIUM 125 UG/1
125 TABLET ORAL DAILY
Qty: 90 TABLET | Refills: 1 | Status: SHIPPED | OUTPATIENT
Start: 2024-08-22

## 2024-08-22 RX ORDER — AMLODIPINE BESYLATE 5 MG/1
5 TABLET ORAL DAILY
Qty: 90 TABLET | Refills: 1 | Status: SHIPPED | OUTPATIENT
Start: 2024-08-22

## 2024-08-22 RX ORDER — VENLAFAXINE 75 MG/1
75 TABLET ORAL 2 TIMES DAILY
Qty: 180 TABLET | Refills: 1 | Status: SHIPPED | OUTPATIENT
Start: 2024-08-22

## 2024-08-23 ENCOUNTER — RA CDI HCC (OUTPATIENT)
Dept: OTHER | Facility: HOSPITAL | Age: 69
End: 2024-08-23

## 2024-08-29 ENCOUNTER — OFFICE VISIT (OUTPATIENT)
Dept: FAMILY MEDICINE CLINIC | Facility: CLINIC | Age: 69
End: 2024-08-29
Payer: COMMERCIAL

## 2024-08-29 VITALS
HEIGHT: 69 IN | OXYGEN SATURATION: 97 % | HEART RATE: 79 BPM | DIASTOLIC BLOOD PRESSURE: 70 MMHG | BODY MASS INDEX: 28.91 KG/M2 | TEMPERATURE: 96.2 F | SYSTOLIC BLOOD PRESSURE: 140 MMHG | WEIGHT: 195.2 LBS

## 2024-08-29 DIAGNOSIS — Z00.00 MEDICARE ANNUAL WELLNESS VISIT, SUBSEQUENT: Primary | ICD-10-CM

## 2024-08-29 DIAGNOSIS — F32.A DEPRESSIVE DISORDER: ICD-10-CM

## 2024-08-29 DIAGNOSIS — Z87.891 FORMER SMOKER: ICD-10-CM

## 2024-08-29 DIAGNOSIS — S80.212A ABRASION, KNEE, LEFT, INITIAL ENCOUNTER: ICD-10-CM

## 2024-08-29 DIAGNOSIS — Z12.31 BREAST CANCER SCREENING BY MAMMOGRAM: ICD-10-CM

## 2024-08-29 DIAGNOSIS — Z23 ENCOUNTER FOR IMMUNIZATION: ICD-10-CM

## 2024-08-29 DIAGNOSIS — Z87.891 PERSONAL HISTORY OF NICOTINE DEPENDENCE: ICD-10-CM

## 2024-08-29 DIAGNOSIS — I10 ESSENTIAL HYPERTENSION: ICD-10-CM

## 2024-08-29 DIAGNOSIS — F41.9 ANXIETY: ICD-10-CM

## 2024-08-29 PROCEDURE — 99214 OFFICE O/P EST MOD 30 MIN: CPT | Performed by: FAMILY MEDICINE

## 2024-08-29 PROCEDURE — 90471 IMMUNIZATION ADMIN: CPT | Performed by: FAMILY MEDICINE

## 2024-08-29 PROCEDURE — G0439 PPPS, SUBSEQ VISIT: HCPCS | Performed by: FAMILY MEDICINE

## 2024-08-29 PROCEDURE — 90714 TD VACC NO PRESV 7 YRS+ IM: CPT | Performed by: FAMILY MEDICINE

## 2024-08-29 NOTE — PROGRESS NOTES
Ambulatory Visit  Name: Analy Chopra      : 1955      MRN: [id number]  Encounter Provider: Betina Membreno DO  Encounter Date: 2024   Encounter department: [facility]    Assessment & Plan   1. Medicare annual wellness visit, subsequent  2. Anxiety  3. Depressive disorder  4. Encounter for immunization  -     TD VACCINE GREATER THAN OR EQUAL TO 8YO PRESERVATIVE FREE IM  5. Abrasion, knee, left, initial encounter  -     TD VACCINE GREATER THAN OR EQUAL TO 8YO PRESERVATIVE FREE IM  6. Personal history of nicotine dependence  -     CT lung screening program; Future; Expected date: 2024  7. Former smoker  -     CT lung screening program; Future; Expected date: 2024  8. Breast cancer screening by mammogram  -     Mammo screening bilateral w 3d & cad; Future; Expected date: 2024  Chronic conditions controlled, cpm     Preventive health issues were discussed with patient, and age appropriate screening tests were ordered as noted in patient's After Visit Summary. Personalized health advice and appropriate referrals for health education or preventive services given if needed, as noted in patient's After Visit Summary.    History of Present Illness     Medicare AWV + f/u  States she did a different type of colon cancer screening last year - possibly sent by insurance company - she will look for name to get result- nothing in EHR  In discussing falls/fall screening- pt states she wonders if some of the imbalance is from her right hip - - states twice had head injuries with falls in the past 10 years- one resulted in intracranial hemorrhage and was admitted for few days  Has seen ortho for the hip - last 2 yrs ago- pt states she needs to get her hiatal hernia repaired first before hip replacement surg  Able to get around ok- States she had gone cross-country camping (pulling a trailer) for two months this spring, just her and her dog        Patient Care Team:  Betina  DO Temi as PCP - General (Family Medicine)  Jessica Mares DO (Gastroenterology)  COMFORT Borrego as Nurse Practitioner (Gastroenterology)    Review of Systems   HENT:          Tinnitus both ears intermittently and sometimes ear canal itches     Medical History Reviewed by provider this encounter:       Annual Wellness Visit Questionnaire   Analy is here for her Subsequent Wellness visit. Last Medicare Wellness visit information reviewed, patient interviewed and updates made to the record today.      Health Risk Assessment:   Patient rates overall health as fair. Patient feels that their physical health rating is same. Patient is satisfied with their life. Eyesight was rated as slightly worse. Hearing was rated as slightly worse. Patient feels that their emotional and mental health rating is same. Patients states they are never, rarely angry. Patient states they are sometimes unusually tired/fatigued. Pain experienced in the last 7 days has been a lot. Patient's pain rating has been 8/10. Patient states that she has experienced no weight loss or gain in last 6 months. Joints, hips, hands, shoulders hurt    Depression Screening:   PHQ-9 Score: 2      Fall Risk Screening:   In the past year, patient has experienced: history of falling in past year    Number of falls: 2 or more  Injured during fall?: No    Feels unsteady when standing or walking?: Yes    Worried about falling?: Yes      Urinary Incontinence Screening:   Patient has not leaked urine accidently in the last six months.     Home Safety:  Patient has trouble with stairs inside or outside of their home. Patient has working smoke alarms and has working carbon monoxide detector. Home safety hazards include: none.     Nutrition:   Current diet is Unhealthy and Limited junk food. No meat no soda pop ever    Medications:   Patient is not currently taking any over-the-counter supplements. Patient is able to manage medications.     Activities of  Daily Living (ADLs)/Instrumental Activities of Daily Living (IADLs):   Walk and transfer into and out of bed and chair?: Yes  Dress and groom yourself?: Yes    Bathe or shower yourself?: Yes    Feed yourself? Yes  Do your laundry/housekeeping?: Yes  Manage your money, pay your bills and track your expenses?: Yes  Make your own meals?: Yes    Do your own shopping?: Yes    Previous Hospitalizations:   Any hospitalizations or ED visits within the last 12 months?: No      Advance Care Planning:   Living will: No    Durable POA for healthcare: No    Advanced directive: No    ACP document given: Yes      Cognitive Screening:   Provider or family/friend/caregiver concerned regarding cognition?: No    PREVENTIVE SCREENINGS      Cardiovascular Screening:    General: Screening Not Indicated and History Lipid Disorder      Diabetes Screening:     General: Screening Current      Breast Cancer Screening:     General: Screening Current      Cervical Cancer Screening:    General: Screening Not Indicated      Osteoporosis Screening:    General: Screening Current      Abdominal Aortic Aneurysm (AAA) Screening:        General: Screening Not Indicated      Lung Cancer Screening:     General: Screening Current      Hepatitis C Screening:    General: Screening Current    Screening, Brief Intervention, and Referral to Treatment (SBIRT)    Screening  Typical number of drinks in a day: 0  Typical number of drinks in a week: 0  Interpretation: Low risk drinking behavior.    AUDIT-C Screenin) How often did you have a drink containing alcohol in the past year? never  2) How many drinks did you have on a typical day when you were drinking in the past year? 0  3) How often did you have 6 or more drinks on one occasion in the past year? never    AUDIT-C Score: 0  Interpretation: Score 0-2 (female): Negative screen for alcohol misuse    Single Item Drug Screening:  How often have you used an illegal drug (including marijuana) or a  "prescription medication for non-medical reasons in the past year? never    Single Item Drug Screen Score: 0  Interpretation: Negative screen for possible drug use disorder    Social Determinants of Health     Financial Resource Strain: Low Risk  (8/10/2023)    Overall Financial Resource Strain (CARDIA)     Difficulty of Paying Living Expenses: Not hard at all   Food Insecurity: No Food Insecurity (8/27/2024)    Hunger Vital Sign     Worried About Running Out of Food in the Last Year: Never true     Ran Out of Food in the Last Year: Never true   Transportation Needs: No Transportation Needs (8/27/2024)    PRAPARE - Transportation     Lack of Transportation (Medical): No     Lack of Transportation (Non-Medical): No   Housing Stability: Low Risk  (8/27/2024)    Housing Stability Vital Sign     Unable to Pay for Housing in the Last Year: No     Number of Times Moved in the Last Year: 0     Homeless in the Last Year: No   Utilities: Not At Risk (8/27/2024)    [facility] Utilities     Threatened with loss of utilities: No     No results found.    Objective     /70 (BP Location: Left arm, Patient Position: Sitting, Cuff Size: Standard)   Pulse 79   Temp (!) 96.2 °F (35.7 °C) (Tympanic)   Ht 5' 9\" (1.753 m)   Wt 88.5 kg (195 lb 3.2 oz)   SpO2 97%   BMI 28.83 kg/m²     Physical Exam  Vitals and nursing note reviewed.   Constitutional:       General: She is not in acute distress.     Appearance: She is well-groomed. She is not ill-appearing, toxic-appearing or diaphoretic.   HENT:      Head: Normocephalic and atraumatic.      Right Ear: Tympanic membrane, ear canal and external ear normal.      Left Ear: Tympanic membrane, ear canal and external ear normal.      Nose: Nose normal.      Mouth/Throat:      Lips: Pink.      Mouth: Mucous membranes are moist.      Pharynx: Oropharynx is clear. Uvula midline.   Eyes:      General: Lids are normal.      Conjunctiva/sclera: Conjunctivae normal.   Cardiovascular:      Rate and " Rhythm: Normal rate and regular rhythm.      Pulses: Normal pulses.      Heart sounds: Normal heart sounds.   Pulmonary:      Effort: Pulmonary effort is normal.      Breath sounds: Normal breath sounds and air entry.   Abdominal:      General: Bowel sounds are normal.      Palpations: Abdomen is soft. There is no hepatomegaly, splenomegaly or mass.      Tenderness: There is no abdominal tenderness.   Musculoskeletal:      Right lower leg: No edema.      Left lower leg: No edema.   Skin:     General: Skin is warm and dry.      Coloration: Skin is not pale.          Neurological:      General: No focal deficit present.      Mental Status: She is alert and oriented to person, place, and time.      Gait: Gait normal.   Psychiatric:         Mood and Affect: Mood normal.         Behavior: Behavior normal. Behavior is cooperative.         Cognition and Memory: Cognition and memory normal.

## 2024-08-29 NOTE — PATIENT INSTRUCTIONS
Medicare Preventive Visit Patient Instructions  Thank you for completing your Welcome to Medicare Visit or Medicare Annual Wellness Visit today. Your next wellness visit will be due in one year (8/30/2025).  The screening/preventive services that you may require over the next 5-10 years are detailed below. Some tests may not apply to you based off risk factors and/or age. Screening tests ordered at today's visit but not completed yet may show as past due. Also, please note that scanned in results may not display below.  Preventive Screenings:  Service Recommendations Previous Testing/Comments   Colorectal Cancer Screening  * Colonoscopy    * Fecal Occult Blood Test (FOBT)/Fecal Immunochemical Test (FIT)  * Fecal DNA/Cologuard Test  * Flexible Sigmoidoscopy Age: 45-75 years old   Colonoscopy: every 10 years (may be performed more frequently if at higher risk)  OR  FOBT/FIT: every 1 year  OR  Cologuard: every 3 years  OR  Sigmoidoscopy: every 5 years  Screening may be recommended earlier than age 45 if at higher risk for colorectal cancer. Also, an individualized decision between you and your healthcare provider will decide whether screening between the ages of 76-85 would be appropriate. Colonoscopy: Not on file  FOBT/FIT: Not on file  Cologuard: Not on file  Sigmoidoscopy: Not on file          Breast Cancer Screening Age: 40+ years old  Frequency: every 1-2 years  Not required if history of left and right mastectomy Mammogram: 09/25/2023    Screening Current   Cervical Cancer Screening Between the ages of 21-29, pap smear recommended once every 3 years.   Between the ages of 30-65, can perform pap smear with HPV co-testing every 5 years.   Recommendations may differ for women with a history of total hysterectomy, cervical cancer, or abnormal pap smears in past. Pap Smear: Not on file    Screening Not Indicated   Hepatitis C Screening Once for adults born between 1945 and 1965  More frequently in patients at high  risk for Hepatitis C Hep C Antibody: 02/17/2020    Screening Current   Diabetes Screening 1-2 times per year if you're at risk for diabetes or have pre-diabetes Fasting glucose: 114 mg/dL (12/18/2023)  A1C: 5.9 % (2/26/2024)  Screening Current   Cholesterol Screening Once every 5 years if you don't have a lipid disorder. May order more often based on risk factors. Lipid panel: 12/18/2023    Screening Not Indicated  History Lipid Disorder     Other Preventive Screenings Covered by Medicare:  Abdominal Aortic Aneurysm (AAA) Screening: covered once if your at risk. You're considered to be at risk if you have a family history of AAA.  Lung Cancer Screening: covers low dose CT scan once per year if you meet all of the following conditions: (1) Age 55-77; (2) No signs or symptoms of lung cancer; (3) Current smoker or have quit smoking within the last 15 years; (4) You have a tobacco smoking history of at least 20 pack years (packs per day multiplied by number of years you smoked); (5) You get a written order from a healthcare provider.  Glaucoma Screening: covered annually if you're considered high risk: (1) You have diabetes OR (2) Family history of glaucoma OR (3)  aged 50 and older OR (4)  American aged 65 and older  Osteoporosis Screening: covered every 2 years if you meet one of the following conditions: (1) You're estrogen deficient and at risk for osteoporosis based off medical history and other findings; (2) Have a vertebral abnormality; (3) On glucocorticoid therapy for more than 3 months; (4) Have primary hyperparathyroidism; (5) On osteoporosis medications and need to assess response to drug therapy.   Last bone density test (DXA Scan): 09/25/2023.  HIV Screening: covered annually if you're between the age of 15-65. Also covered annually if you are younger than 15 and older than 65 with risk factors for HIV infection. For pregnant patients, it is covered up to 3 times per  pregnancy.    Immunizations:  Immunization Recommendations   Influenza Vaccine Annual influenza vaccination during flu season is recommended for all persons aged >= 6 months who do not have contraindications   Pneumococcal Vaccine   * Pneumococcal conjugate vaccine = PCV13 (Prevnar 13), PCV15 (Vaxneuvance), PCV20 (Prevnar 20)  * Pneumococcal polysaccharide vaccine = PPSV23 (Pneumovax) Adults 19-65 yo with certain risk factors or if 65+ yo  If never received any pneumonia vaccine: recommend Prevnar 20 (PCV20)  Give PCV20 if previously received 1 dose of PCV13 or PPSV23   Hepatitis B Vaccine 3 dose series if at intermediate or high risk (ex: diabetes, end stage renal disease, liver disease)   Respiratory syncytial virus (RSV) Vaccine - COVERED BY MEDICARE PART D  * RSVPreF3 (Arexvy) CDC recommends that adults 60 years of age and older may receive a single dose of RSV vaccine using shared clinical decision-making (SCDM)   Tetanus (Td) Vaccine - COST NOT COVERED BY MEDICARE PART B Following completion of primary series, a booster dose should be given every 10 years to maintain immunity against tetanus. Td may also be given as tetanus wound prophylaxis.   Tdap Vaccine - COST NOT COVERED BY MEDICARE PART B Recommended at least once for all adults. For pregnant patients, recommended with each pregnancy.   Shingles Vaccine (Shingrix) - COST NOT COVERED BY MEDICARE PART B  2 shot series recommended in those 19 years and older who have or will have weakened immune systems or those 50 years and older     Health Maintenance Due:      Topic Date Due   • Colorectal Cancer Screening  06/07/2024   • Lung Cancer Screening  08/31/2024   • Breast Cancer Screening: Mammogram  09/25/2024   • Hepatitis C Screening  Completed     Immunizations Due:      Topic Date Due   • COVID-19 Vaccine (4 - 2023-24 season) 09/01/2023   • Influenza Vaccine (1) 09/01/2024     Advance Directives   What are advance directives?  Advance directives are legal  documents that state your wishes and plans for medical care. These plans are made ahead of time in case you lose your ability to make decisions for yourself. Advance directives can apply to any medical decision, such as the treatments you want, and if you want to donate organs.   What are the types of advance directives?  There are many types of advance directives, and each state has rules about how to use them. You may choose a combination of any of the following:  Living will:  This is a written record of the treatment you want. You can also choose which treatments you do not want, which to limit, and which to stop at a certain time. This includes surgery, medicine, IV fluid, and tube feedings.   Durable power of  for healthcare (DPAHC):  This is a written record that states who you want to make healthcare choices for you when you are unable to make them for yourself. This person, called a proxy, is usually a family member or a friend. You may choose more than 1 proxy.  Do not resuscitate (DNR) order:  A DNR order is used in case your heart stops beating or you stop breathing. It is a request not to have certain forms of treatment, such as CPR. A DNR order may be included in other types of advance directives.  Medical directive:  This covers the care that you want if you are in a coma, near death, or unable to make decisions for yourself. You can list the treatments you want for each condition. Treatment may include pain medicine, surgery, blood transfusions, dialysis, IV or tube feedings, and a ventilator (breathing machine).  Values history:  This document has questions about your views, beliefs, and how you feel and think about life. This information can help others choose the care that you would choose.  Why are advance directives important?  An advance directive helps you control your care. Although spoken wishes may be used, it is better to have your wishes written down. Spoken wishes can be  misunderstood, or not followed. Treatments may be given even if you do not want them. An advance directive may make it easier for your family to make difficult choices about your care.   Fall Prevention    Fall prevention  includes ways to make your home and other areas safer. It also includes ways you can move more carefully to prevent a fall. Health conditions that cause changes in your blood pressure, vision, or muscle strength and coordination may increase your risk for falls. Medicines may also increase your risk for falls if they make you dizzy, weak, or sleepy.   Fall prevention tips:   Stand or sit up slowly.    Use assistive devices as directed.    Wear shoes that fit well and have soles that .    Wear a personal alarm.    Stay active.    Manage your medical conditions.    Home Safety Tips:  Add items to prevent falls in the bathroom.    Keep paths clear.    Install bright lights in your home.    Keep items you use often on shelves within reach.    Paint or place reflective tape on the edges of your stairs.    Weight Management   Why it is important to manage your weight:  Being overweight increases your risk of health conditions such as heart disease, high blood pressure, type 2 diabetes, and certain types of cancer. It can also increase your risk for osteoarthritis, sleep apnea, and other respiratory problems. Aim for a slow, steady weight loss. Even a small amount of weight loss can lower your risk of health problems.  How to lose weight safely:  A safe and healthy way to lose weight is to eat fewer calories and get regular exercise. You can lose up about 1 pound a week by decreasing the number of calories you eat by 500 calories each day.   Healthy meal plan for weight management:  A healthy meal plan includes a variety of foods, contains fewer calories, and helps you stay healthy. A healthy meal plan includes the following:  Eat whole-grain foods more often.  A healthy meal plan should contain  fiber. Fiber is the part of grains, fruits, and vegetables that is not broken down by your body. Whole-grain foods are healthy and provide extra fiber in your diet. Some examples of whole-grain foods are whole-wheat breads and pastas, oatmeal, brown rice, and bulgur.  Eat a variety of vegetables every day.  Include dark, leafy greens such as spinach, kale, trevor greens, and mustard greens. Eat yellow and orange vegetables such as carrots, sweet potatoes, and winter squash.   Eat a variety of fruits every day.  Choose fresh or canned fruit (canned in its own juice or light syrup) instead of juice. Fruit juice has very little or no fiber.  Eat low-fat dairy foods.  Drink fat-free (skim) milk or 1% milk. Eat fat-free yogurt and low-fat cottage cheese. Try low-fat cheeses such as mozzarella and other reduced-fat cheeses.  Choose meat and other protein foods that are low in fat.  Choose beans or other legumes such as split peas or lentils. Choose fish, skinless poultry (chicken or turkey), or lean cuts of red meat (beef or pork). Before you cook meat or poultry, cut off any visible fat.   Use less fat and oil.  Try baking foods instead of frying them. Add less fat, such as margarine, sour cream, regular salad dressing and mayonnaise to foods. Eat fewer high-fat foods. Some examples of high-fat foods include french fries, doughnuts, ice cream, and cakes.  Eat fewer sweets.  Limit foods and drinks that are high in sugar. This includes candy, cookies, regular soda, and sweetened drinks.  Exercise:  Exercise at least 30 minutes per day on most days of the week. Some examples of exercise include walking, biking, dancing, and swimming. You can also fit in more physical activity by taking the stairs instead of the elevator or parking farther away from stores. Ask your healthcare provider about the best exercise plan for you.      © Copyright [employer] 2018 Information is for End User's use only and may not be sold,  redistributed or otherwise used for commercial purposes. All illustrations and images included in CareNotes® are the copyrighted property of [employer] or [employer]      Medicare Preventive Visit Patient Instructions  Thank you for completing your Welcome to Medicare Visit or Medicare Annual Wellness Visit today. Your next wellness visit will be due in one year (8/30/2025).  The screening/preventive services that you may require over the next 5-10 years are detailed below. Some tests may not apply to you based off risk factors and/or age. Screening tests ordered at today's visit but not completed yet may show as past due. Also, please note that scanned in results may not display below.  Preventive Screenings:  Service Recommendations Previous Testing/Comments   Colorectal Cancer Screening  * Colonoscopy    * Fecal Occult Blood Test (FOBT)/Fecal Immunochemical Test (FIT)  * Fecal DNA/Cologuard Test  * Flexible Sigmoidoscopy Age: 45-75 years old   Colonoscopy: every 10 years (may be performed more frequently if at higher risk)  OR  FOBT/FIT: every 1 year  OR  Cologuard: every 3 years  OR  Sigmoidoscopy: every 5 years  Screening may be recommended earlier than age 45 if at higher risk for colorectal cancer. Also, an individualized decision between you and your healthcare provider will decide whether screening between the ages of 76-85 would be appropriate. Colonoscopy: Not on file  FOBT/FIT: Not on file  Cologuard: Not on file  Sigmoidoscopy: Not on file          Breast Cancer Screening Age: 40+ years old  Frequency: every 1-2 years  Not required if history of left and right mastectomy Mammogram: 09/25/2023    Screening Current   Cervical Cancer Screening Between the ages of 21-29, pap smear recommended once every 3 years.   Between the ages of 30-65, can perform pap smear with HPV co-testing every 5 years.   Recommendations may differ for women with a history of total hysterectomy, cervical cancer, or  abnormal pap smears in past. Pap Smear: Not on file    Screening Not Indicated   Hepatitis C Screening Once for adults born between 1945 and 1965  More frequently in patients at high risk for Hepatitis C Hep C Antibody: 02/17/2020    Screening Current   Diabetes Screening 1-2 times per year if you're at risk for diabetes or have pre-diabetes Fasting glucose: 114 mg/dL (12/18/2023)  A1C: 5.9 % (2/26/2024)  Screening Current   Cholesterol Screening Once every 5 years if you don't have a lipid disorder. May order more often based on risk factors. Lipid panel: 12/18/2023    Screening Not Indicated  History Lipid Disorder     Other Preventive Screenings Covered by Medicare:  Abdominal Aortic Aneurysm (AAA) Screening: covered once if your at risk. You're considered to be at risk if you have a family history of AAA.  Lung Cancer Screening: covers low dose CT scan once per year if you meet all of the following conditions: (1) Age 55-77; (2) No signs or symptoms of lung cancer; (3) Current smoker or have quit smoking within the last 15 years; (4) You have a tobacco smoking history of at least 20 pack years (packs per day multiplied by number of years you smoked); (5) You get a written order from a healthcare provider.  Glaucoma Screening: covered annually if you're considered high risk: (1) You have diabetes OR (2) Family history of glaucoma OR (3)  aged 50 and older OR (4)  American aged 65 and older  Osteoporosis Screening: covered every 2 years if you meet one of the following conditions: (1) You're estrogen deficient and at risk for osteoporosis based off medical history and other findings; (2) Have a vertebral abnormality; (3) On glucocorticoid therapy for more than 3 months; (4) Have primary hyperparathyroidism; (5) On osteoporosis medications and need to assess response to drug therapy.   Last bone density test (DXA Scan): 09/25/2023.  HIV Screening: covered annually if you're between the age  of 15-65. Also covered annually if you are younger than 15 and older than 65 with risk factors for HIV infection. For pregnant patients, it is covered up to 3 times per pregnancy.    Immunizations:  Immunization Recommendations   Influenza Vaccine Annual influenza vaccination during flu season is recommended for all persons aged >= 6 months who do not have contraindications   Pneumococcal Vaccine   * Pneumococcal conjugate vaccine = PCV13 (Prevnar 13), PCV15 (Vaxneuvance), PCV20 (Prevnar 20)  * Pneumococcal polysaccharide vaccine = PPSV23 (Pneumovax) Adults 19-63 yo with certain risk factors or if 65+ yo  If never received any pneumonia vaccine: recommend Prevnar 20 (PCV20)  Give PCV20 if previously received 1 dose of PCV13 or PPSV23   Hepatitis B Vaccine 3 dose series if at intermediate or high risk (ex: diabetes, end stage renal disease, liver disease)   Respiratory syncytial virus (RSV) Vaccine - COVERED BY MEDICARE PART D  * RSVPreF3 (Arexvy) CDC recommends that adults 60 years of age and older may receive a single dose of RSV vaccine using shared clinical decision-making (SCDM)   Tetanus (Td) Vaccine - COST NOT COVERED BY MEDICARE PART B Following completion of primary series, a booster dose should be given every 10 years to maintain immunity against tetanus. Td may also be given as tetanus wound prophylaxis.   Tdap Vaccine - COST NOT COVERED BY MEDICARE PART B Recommended at least once for all adults. For pregnant patients, recommended with each pregnancy.   Shingles Vaccine (Shingrix) - COST NOT COVERED BY MEDICARE PART B  2 shot series recommended in those 19 years and older who have or will have weakened immune systems or those 50 years and older     Health Maintenance Due:      Topic Date Due   • Colorectal Cancer Screening  06/07/2024   • Lung Cancer Screening  08/31/2024   • Breast Cancer Screening: Mammogram  09/25/2024   • Hepatitis C Screening  Completed     Immunizations Due:      Topic Date Due   •  COVID-19 Vaccine (4 - 2023-24 season) 09/01/2023   • Influenza Vaccine (1) 09/01/2024     Advance Directives   What are advance directives?  Advance directives are legal documents that state your wishes and plans for medical care. These plans are made ahead of time in case you lose your ability to make decisions for yourself. Advance directives can apply to any medical decision, such as the treatments you want, and if you want to donate organs.   What are the types of advance directives?  There are many types of advance directives, and each state has rules about how to use them. You may choose a combination of any of the following:  Living will:  This is a written record of the treatment you want. You can also choose which treatments you do not want, which to limit, and which to stop at a certain time. This includes surgery, medicine, IV fluid, and tube feedings.   Durable power of  for healthcare (DPAHC):  This is a written record that states who you want to make healthcare choices for you when you are unable to make them for yourself. This person, called a proxy, is usually a family member or a friend. You may choose more than 1 proxy.  Do not resuscitate (DNR) order:  A DNR order is used in case your heart stops beating or you stop breathing. It is a request not to have certain forms of treatment, such as CPR. A DNR order may be included in other types of advance directives.  Medical directive:  This covers the care that you want if you are in a coma, near death, or unable to make decisions for yourself. You can list the treatments you want for each condition. Treatment may include pain medicine, surgery, blood transfusions, dialysis, IV or tube feedings, and a ventilator (breathing machine).  Values history:  This document has questions about your views, beliefs, and how you feel and think about life. This information can help others choose the care that you would choose.  Why are advance directives  important?  An advance directive helps you control your care. Although spoken wishes may be used, it is better to have your wishes written down. Spoken wishes can be misunderstood, or not followed. Treatments may be given even if you do not want them. An advance directive may make it easier for your family to make difficult choices about your care.   Fall Prevention    Fall prevention  includes ways to make your home and other areas safer. It also includes ways you can move more carefully to prevent a fall. Health conditions that cause changes in your blood pressure, vision, or muscle strength and coordination may increase your risk for falls. Medicines may also increase your risk for falls if they make you dizzy, weak, or sleepy.   Fall prevention tips:   Stand or sit up slowly.    Use assistive devices as directed.    Wear shoes that fit well and have soles that .    Wear a personal alarm.    Stay active.    Manage your medical conditions.    Home Safety Tips:  Add items to prevent falls in the bathroom.    Keep paths clear.    Install bright lights in your home.    Keep items you use often on shelves within reach.    Paint or place reflective tape on the edges of your stairs.    Weight Management   Why it is important to manage your weight:  Being overweight increases your risk of health conditions such as heart disease, high blood pressure, type 2 diabetes, and certain types of cancer. It can also increase your risk for osteoarthritis, sleep apnea, and other respiratory problems. Aim for a slow, steady weight loss. Even a small amount of weight loss can lower your risk of health problems.  How to lose weight safely:  A safe and healthy way to lose weight is to eat fewer calories and get regular exercise. You can lose up about 1 pound a week by decreasing the number of calories you eat by 500 calories each day.   Healthy meal plan for weight management:  A healthy meal plan includes a variety of foods,  contains fewer calories, and helps you stay healthy. A healthy meal plan includes the following:  Eat whole-grain foods more often.  A healthy meal plan should contain fiber. Fiber is the part of grains, fruits, and vegetables that is not broken down by your body. Whole-grain foods are healthy and provide extra fiber in your diet. Some examples of whole-grain foods are whole-wheat breads and pastas, oatmeal, brown rice, and bulgur.  Eat a variety of vegetables every day.  Include dark, leafy greens such as spinach, kale, trevor greens, and mustard greens. Eat yellow and orange vegetables such as carrots, sweet potatoes, and winter squash.   Eat a variety of fruits every day.  Choose fresh or canned fruit (canned in its own juice or light syrup) instead of juice. Fruit juice has very little or no fiber.  Eat low-fat dairy foods.  Drink fat-free (skim) milk or 1% milk. Eat fat-free yogurt and low-fat cottage cheese. Try low-fat cheeses such as mozzarella and other reduced-fat cheeses.  Choose meat and other protein foods that are low in fat.  Choose beans or other legumes such as split peas or lentils. Choose fish, skinless poultry (chicken or turkey), or lean cuts of red meat (beef or pork). Before you cook meat or poultry, cut off any visible fat.   Use less fat and oil.  Try baking foods instead of frying them. Add less fat, such as margarine, sour cream, regular salad dressing and mayonnaise to foods. Eat fewer high-fat foods. Some examples of high-fat foods include french fries, doughnuts, ice cream, and cakes.  Eat fewer sweets.  Limit foods and drinks that are high in sugar. This includes candy, cookies, regular soda, and sweetened drinks.  Exercise:  Exercise at least 30 minutes per day on most days of the week. Some examples of exercise include walking, biking, dancing, and swimming. You can also fit in more physical activity by taking the stairs instead of the elevator or parking farther away from stores.  Ask your healthcare provider about the best exercise plan for you.      © Copyright [employer] 2018 Information is for End User's use only and may not be sold, redistributed or otherwise used for commercial purposes. All illustrations and images included in CareNotes® are the copyrighted property of [employer] or [employer]

## 2024-09-03 ENCOUNTER — TELEPHONE (OUTPATIENT)
Dept: CARDIAC SURGERY | Facility: CLINIC | Age: 69
End: 2024-09-03

## 2024-09-03 NOTE — TELEPHONE ENCOUNTER
1st attempt- LVM for pt to please call the office back to reschedule follow-up appointment with Dr. Lopez.

## 2024-09-03 NOTE — TELEPHONE ENCOUNTER
Patient was scheduled to see  on 9/12 but canceled due to needing BRAVO testing done. Patient called to reschedule appointment with  after testing. Patient is currently scheduled for 10/31. Patient is upset cause she has a wait a month to talk to provider and she doesn't want to wait that long. Patient will like to be rescheduled for the first or 2 week of October.     Please call patient to reschedule.     Thanks!

## 2024-09-04 ENCOUNTER — TELEPHONE (OUTPATIENT)
Dept: CARDIAC SURGERY | Facility: CLINIC | Age: 69
End: 2024-09-04

## 2024-09-04 NOTE — TELEPHONE ENCOUNTER
Left message for patient to move up her follow up appt from 10/31/24 to 10/3/24 at 3:20pm. Awaiting on call back for appointment confirmation.

## 2024-09-09 DIAGNOSIS — E78.2 MIXED HYPERLIPIDEMIA: ICD-10-CM

## 2024-09-09 RX ORDER — ATORVASTATIN CALCIUM 80 MG/1
80 TABLET, FILM COATED ORAL
Qty: 90 TABLET | Refills: 1 | Status: SHIPPED | OUTPATIENT
Start: 2024-09-09

## 2024-09-27 ENCOUNTER — ANESTHESIA EVENT (OUTPATIENT)
Dept: GASTROENTEROLOGY | Facility: HOSPITAL | Age: 69
End: 2024-09-27
Payer: COMMERCIAL

## 2024-09-27 ENCOUNTER — ANESTHESIA (OUTPATIENT)
Dept: GASTROENTEROLOGY | Facility: HOSPITAL | Age: 69
End: 2024-09-27
Payer: COMMERCIAL

## 2024-09-27 ENCOUNTER — HOSPITAL ENCOUNTER (OUTPATIENT)
Dept: GASTROENTEROLOGY | Facility: HOSPITAL | Age: 69
Setting detail: OUTPATIENT SURGERY
End: 2024-09-27
Attending: INTERNAL MEDICINE
Payer: COMMERCIAL

## 2024-09-27 VITALS
OXYGEN SATURATION: 94 % | SYSTOLIC BLOOD PRESSURE: 143 MMHG | DIASTOLIC BLOOD PRESSURE: 80 MMHG | RESPIRATION RATE: 15 BRPM | HEART RATE: 58 BPM | TEMPERATURE: 97.1 F

## 2024-09-27 DIAGNOSIS — K21.9 GASTROESOPHAGEAL REFLUX DISEASE, UNSPECIFIED WHETHER ESOPHAGITIS PRESENT: ICD-10-CM

## 2024-09-27 DIAGNOSIS — K44.9 HIATAL HERNIA: ICD-10-CM

## 2024-09-27 PROCEDURE — 43235 EGD DIAGNOSTIC BRUSH WASH: CPT | Performed by: INTERNAL MEDICINE

## 2024-09-27 RX ORDER — SODIUM CHLORIDE, SODIUM LACTATE, POTASSIUM CHLORIDE, CALCIUM CHLORIDE 600; 310; 30; 20 MG/100ML; MG/100ML; MG/100ML; MG/100ML
INJECTION, SOLUTION INTRAVENOUS CONTINUOUS PRN
Status: DISCONTINUED | OUTPATIENT
Start: 2024-09-27 | End: 2024-09-27

## 2024-09-27 RX ORDER — PROPOFOL 10 MG/ML
INJECTION, EMULSION INTRAVENOUS AS NEEDED
Status: DISCONTINUED | OUTPATIENT
Start: 2024-09-27 | End: 2024-09-27

## 2024-09-27 RX ADMIN — PROPOFOL 50 MG: 10 INJECTION, EMULSION INTRAVENOUS at 09:00

## 2024-09-27 RX ADMIN — PROPOFOL 50 MG: 10 INJECTION, EMULSION INTRAVENOUS at 08:59

## 2024-09-27 RX ADMIN — PROPOFOL 50 MG: 10 INJECTION, EMULSION INTRAVENOUS at 09:03

## 2024-09-27 RX ADMIN — PROPOFOL 50 MG: 10 INJECTION, EMULSION INTRAVENOUS at 08:57

## 2024-09-27 RX ADMIN — PROPOFOL 50 MG: 10 INJECTION, EMULSION INTRAVENOUS at 09:01

## 2024-09-27 RX ADMIN — PROPOFOL 100 MG: 10 INJECTION, EMULSION INTRAVENOUS at 08:54

## 2024-09-27 RX ADMIN — SODIUM CHLORIDE, SODIUM LACTATE, POTASSIUM CHLORIDE, AND CALCIUM CHLORIDE: .6; .31; .03; .02 INJECTION, SOLUTION INTRAVENOUS at 08:50

## 2024-09-27 RX ADMIN — PROPOFOL 50 MG: 10 INJECTION, EMULSION INTRAVENOUS at 08:55

## 2024-09-27 NOTE — ANESTHESIA POSTPROCEDURE EVALUATION
Post-Op Assessment Note    CV Status:  Stable  Pain Score: 0    Pain management: adequate       Mental Status:  Alert and awake   Hydration Status:  Euvolemic   PONV Controlled:  Controlled   Airway Patency:  Patent     Post Op Vitals Reviewed: Yes    No anethesia notable event occurred.    Staff: Anesthesiologist, CRNA               /60 (09/27/24 0914)    Temp (!) 97.1 °F (36.2 °C) (09/27/24 0914)    Pulse 61 (09/27/24 0914)   Resp 16 (09/27/24 0914)    SpO2 93 % (09/27/24 0914)

## 2024-09-27 NOTE — H&P
History and Physical -  [facility]  Analy Chopra 69 y.o. adult MRN: [id number]                  HPI: Analy Chopra is a 69 y.o. year old adult who presents for EGD + Bravo      REVIEW OF SYSTEMS: Per the HPI, and otherwise unremarkable.    Historical Information   Past Medical History:   Diagnosis Date    Acute medial meniscus tear of right knee 2019    Depression 1969    Disease of thyroid gland     Synthroid    Essential hypertension     Exertional chest pain     Fall from slipping on ice 2022    Fractures     - rt lower leg break, rt arm, break rt. pinky finger, ribs    Heart disease     Hypothyroidism     Nausea and vomiting 2023    Neck pain, acute 2022    NSTEMI, initial episode of care ([facility]) 2020    Right lower quadrant abdominal pain 2023    SAH (subarachnoid hemorrhage) ([facility]) 2022    Stomach disorder     Traumatic ecchymosis of abdominal wall 2023    Unstable angina pectoris ([facility]) 2020     Past Surgical History:   Procedure Laterality Date    ANGIOPLASTY      CARDIAC CATHETERIZATION  2020    EF 75% There is a small LADD1 branch that has a severe stenosis. Given the small size of the vessel the plan will be to treat medically    TONSILLECTOMY       Social History   Social History     Substance and Sexual Activity   Alcohol Use Not Currently    Comment: None for 10 yrs, rarely historically     Social History     Substance and Sexual Activity   Drug Use Never     Social History     Tobacco Use   Smoking Status Former    Current packs/day: 0.00    Average packs/day: 1 pack/day for 30.0 years (30.0 ttl pk-yrs)    Types: Cigarettes    Start date:     Quit date: 2014    Years since quitting: 10.7   Smokeless Tobacco Former    Quit date: 2009     Family History   Problem Relation Age of Onset    Thyroid disease Mother     Cancer Mother          92 yrs old lung cancer    Heart attack Father     No Known Problems  Sister     Thyroid disease Maternal Grandmother     Cancer Maternal Grandmother     Diabetes Maternal Grandfather     Heart attack Maternal Grandfather     Heart attack Paternal Grandfather     Heart attack Brother     Heart attack Maternal Uncle        Meds/Allergies       Current Outpatient Medications:     amLODIPine (NORVASC) 5 mg tablet    aspirin (ECOTRIN LOW STRENGTH) 81 mg EC tablet    atorvastatin (LIPITOR) 80 mg tablet    famotidine (PEPCID) 20 mg tablet    levothyroxine 125 mcg tablet    venlafaxine (EFFEXOR) 75 mg tablet    esomeprazole (NexIUM) 20 mg capsule    Allergies   Allergen Reactions    Other Hives and Rash     Adhesive tape         Objective     /84   Pulse 75   Resp 20   SpO2 96%       PHYSICAL EXAM    Gen: NAD  Head: NCAT  CV: RRR  CHEST: Clear  ABD: soft, NT/ND  EXT: no edema      ASSESSMENT/PLAN:  This is a 69 y.o. year old adult here for EGD + Bravo, and she is stable and optimized for her procedure.

## 2024-09-27 NOTE — ANESTHESIA PREPROCEDURE EVALUATION
Procedure:  EGD  BRAVO PH MONITORING    Relevant Problems   CARDIO   (+) Coronary artery disease involving native coronary artery of native heart without angina pectoris   (+) Essential hypertension   (+) Mixed hyperlipidemia      ENDO   (+) Hypothyroidism      GI/HEPATIC   (+) Hiatal hernia      MUSCULOSKELETAL   (+) Hiatal hernia      NEURO/PSYCH   (+) Anxiety   (+) Depressive disorder      PULMONARY   (+) Pulmonary emphysema, unspecified emphysema type (HCC)      Neurology/Sleep   (+) Daytime sleepiness      FEN/Gastrointestinal   (+) Nausea and vomiting      Other   (+) BMI 29.0-29.9,adult   (+) History of non-ST elevation myocardial infarction (NSTEMI)   (+) History of seizure      Cath 1/22/20:    Narrative    This result has an attachment that is not available.    · Systemic arterial pressure is hypertensive. There is severe systemic  arterial hypertension. Left ventricular end diastolic pressure is mildly  elevated.  · There is hyperdynamic left ventricular systolic function.  · There is mild diffuse coronary artery disease.  · There is a small LADD1 branch that has a severe stenosis. Given the  small size of the vessel the plan will be to treat medically..    Recommend:  Admit to Tele  Aggressive medical therapy with risk reduction.  Diet and exercise.  Statin therapy and blood pressure control.    Terry Bishop MD, Ph.D.,[facility]      Coronary Findings Diagnostic Dominance: Right  Left Main: The vessel is large and is angiographically normal.  Left Anterior Descending: The vessel is moderate in size. The vessel exhibits minimal luminal irregularities. Mid LAD lesion is 40% stenosed. Not the culprit lesion. Lesion length: discrete (<10mm). KATTY flow is 3. The lesion is discrete. First Diagonal Branch: The vessel is small. 1st Diag lesion is 99% stenosed. Culprit lesion. Lesion length: tubular (10-20mm). KATTY flow is 3. The lesion is tubular. Second Diagonal Branch: The vessel is large. The vessel exhibits  minimal luminal irregularities.  First Obtuse Marginal Branch: The vessel is large and is angiographically normal. The vessel is tortuous.  Right Coronary Artery: The vessel is large. The vessel was dominant. There is mild diffuse disease throughout the vessel. Prox RCA lesion is 30% stenosed. Not the culprit lesion. Lesion length: tubular (10-20mm). KATTY flow is 3. The lesion is tubular.    Intervention  No interventions have been documented.    Left Ventricle  There is hyperdynamic left ventricular systolic function. The ejection fraction is calculated to be 75%.    Mitral Valve  There is no mitral valve regurgitation.    Aortic Valve  There is no aortic valve stenosis.    Access  Right radial artery access. The puncture site was infiltrated with a local anesthetic. The vessel was accessed using the modified Seldinger technique and a wire was threaded into the vessel.  A 6 Fr Introducer was advanced over the wire into the vessel.    Hemostasis  The sheath was removed. Hemostasis was achieved using a radial compression system.    Baseline Hemodynamics  Systemic arterial pressure is hypertensive. There is severe systemic arterial hypertension. Left ventricular end diastolic pressure is mildly elevated.    CathPCI Indications  Physical Exam    Airway    Mallampati score: II  TM Distance: >3 FB  Neck ROM: full     Dental   No notable dental hx     Cardiovascular      Pulmonary      Other Findings  post-pubertal.      Anesthesia Plan  ASA Score- 2     Anesthesia Type- IV sedation with anesthesia with ASA Monitors.         Additional Monitors:     Airway Plan:            Plan Factors-Exercise tolerance (METS): >4 METS.    Chart reviewed.    Patient summary reviewed.    Patient is not a current smoker.  Patient did not smoke on day of surgery.            Induction- intravenous.    Postoperative Plan- Plan for postoperative opioid use.     Perioperative Resuscitation Plan - Level 1 - Full Code.       Informed Consent-  Anesthetic plan and risks discussed with patient.  I personally reviewed this patient with the CRNA. Discussed and agreed on the Anesthesia Plan with the CRNA..

## 2024-10-03 ENCOUNTER — OFFICE VISIT (OUTPATIENT)
Dept: CARDIAC SURGERY | Facility: CLINIC | Age: 69
End: 2024-10-03
Payer: COMMERCIAL

## 2024-10-03 VITALS
SYSTOLIC BLOOD PRESSURE: 126 MMHG | OXYGEN SATURATION: 95 % | HEIGHT: 69 IN | BODY MASS INDEX: 28.9 KG/M2 | WEIGHT: 195.11 LBS | RESPIRATION RATE: 14 BRPM | HEART RATE: 86 BPM | TEMPERATURE: 98.3 F | DIASTOLIC BLOOD PRESSURE: 80 MMHG

## 2024-10-03 DIAGNOSIS — K44.9 HIATAL HERNIA: Primary | ICD-10-CM

## 2024-10-03 PROCEDURE — 99214 OFFICE O/P EST MOD 30 MIN: CPT | Performed by: THORACIC SURGERY (CARDIOTHORACIC VASCULAR SURGERY)

## 2024-10-04 NOTE — PROGRESS NOTES
Assessment/Plan:    Hiatal hernia  Today in the office, we had a conversation regarding her testing.  I explained to the patient, that with a DeMeester score of 7.9 and experiencing, at a max, 11 episodes of reflux in a 24-hour period, I did not know for certain whether an antireflux operation would benefit her.  I explained that it is possible that her symptoms are related to something else.  I explained that without knowing for certain whether an operation would absolutely benefit her, I would take great pause prior to taking her to the operating room.  I explained that proceeding with an antireflux operation should only be performed if we are absolutely sure that I will be of benefit and with the information that I have, it is not sure it.  At this point, the patient became visibly angry and began to argue with me.  She got up and left mid appointment prior to me being able to create a plan for her next steps moving forward.  Of note, I did offer to provide her with the names of different surgeons who she can get a second opinion for but she left prior to this.  I did reach out to Dr. Mares of gastroenterology to make her aware.    Georgie Lopez MD  Thoracic Surgery  Office: [phone]       Diagnoses and all orders for this visit:    Hiatal hernia            Subjective:    Patient ID: Analy Chopra is a 69 y.o. adult.    HPI  Ms. Chopra presents back to my office today to discuss both her motility study as well as her Bravo study.  I have personally reviewed her barium swallow motility study in PACS.  This demonstrates normal peristalsis through the esophagus, normal gastric motility.  She has a very small sliding hiatal hernia which is approximately 1.75 cm.  There was mild reflux observed into the lower esophagus.  I also personally reviewed her Bravo study.  Her DeMeester score was 7.9.  On day 1, she had 11 episodes of reflux and on day 2, she had 7 episodes of reflux.    The patient has  "had no changes in her symptoms since her last visit.    The following portions of the patient's history were reviewed and updated as appropriate: allergies, current medications, past family history, past medical history, past social history, past surgical history and problem list.    Review of Systems   Constitutional:  Negative for chills, fatigue, fever and unexpected weight change.   HENT: Negative.     Eyes: Negative.  Negative for visual disturbance.   Respiratory:  Negative for cough, shortness of breath and stridor.    Cardiovascular:  Negative for chest pain.   Gastrointestinal: Negative.    Endocrine: Negative.    Genitourinary: Negative.    Musculoskeletal: Negative.    Skin: Negative.    Neurological:  Negative for dizziness, light-headedness and headaches.   Psychiatric/Behavioral: Negative.           Objective:   Physical Exam  Constitutional:       General: She is not in acute distress.     Appearance: Normal appearance. She is well-developed and normal weight. She is not diaphoretic.   HENT:      Head: Normocephalic and atraumatic.      Nose: Nose normal.   Eyes:      General: No scleral icterus.     Conjunctiva/sclera: Conjunctivae normal.   Pulmonary:      Effort: Pulmonary effort is normal. No respiratory distress.      Breath sounds: No stridor.   Musculoskeletal:      Cervical back: Normal range of motion.   Skin:     Coloration: Skin is not pale.      Findings: No erythema or rash.   Neurological:      Mental Status: She is alert and oriented to person, place, and time. Mental status is at baseline.   Psychiatric:         Mood and Affect: Mood normal.         Behavior: Behavior normal.         Thought Content: Thought content normal.         Judgment: Judgment normal.     /80 (BP Location: Left arm, Patient Position: Sitting, Cuff Size: Standard)   Pulse 86   Temp 98.3 °F (36.8 °C) (Temporal)   Resp 14   Ht 5' 9\" (1.753 m)   Wt 88.5 kg (195 lb 1.7 oz)   SpO2 95%   BMI 28.81 kg/m²   "   No Chest XR results available for this patient.   No CT Chest results available for this patient.  No CT Chest,Abdomen,Pelvis results available for this patient.   No NM PET CT results available for this patient.   FL barium swallow    Result Date: 4/2/2024  Narrative BARIUM SWALLOW / ESOPHAGRAM INDICATION: K21.9: Gastro-esophageal reflux disease without esophagitis K44.9: Diaphragmatic hernia without obstruction or gangrene. COMPARISON: None TECHNIQUE: The patient was given effervescent granules and barium by mouth and images of the esophagus were obtained. IMAGES: 24 fluoroscopic spot views. FLUOROSCOPY TIME: 1.5 minutes FINDINGS: Normal caliber of the esophagus. Esophageal motility is normal and emptying of contrast from the esophagus is prompt. Slight mucosal irregularity and distal esophagus fine granular mucosal presentation. No mucosal lesion, ulceration, nor evidence of fold thickening. Mild/moderate reducible hiatal hernia with remarkable gastroesophageal reflux to the thoracic outlet. Hiatal hernia     Impression Hiatal hernia with remarkable gastroesophageal reflux Distal esophagus mild esophagitis. The study was marked in EPIC for significant notification. Workstation performed: [id number]

## 2024-10-04 NOTE — ASSESSMENT & PLAN NOTE
Today in the office, we had a conversation regarding her testing.  I explained to the patient, that with a DeMeester score of 7.9 and experiencing, at a max, 11 episodes of reflux in a 24-hour period, I did not know for certain whether an antireflux operation would benefit her.  I explained that it is possible that her symptoms are related to something else.  I explained that without knowing for certain whether an operation would absolutely benefit her, I would take great pause prior to taking her to the operating room.  I explained that proceeding with an antireflux operation should only be performed if we are absolutely sure that I will be of benefit and with the information that I have, it is not sure it.  At this point, the patient became visibly angry and began to argue with me.  She got up and left mid appointment prior to me being able to create a plan for her next steps moving forward.  Of note, I did offer to provide her with the names of different surgeons who she can get a second opinion for but she left prior to this.  I did reach out to Dr. Mares of gastroenterology to make her aware.    Georgie Lopez MD  Thoracic Surgery  Office: [phone]

## 2025-01-03 DIAGNOSIS — K21.9 GASTROESOPHAGEAL REFLUX DISEASE WITHOUT ESOPHAGITIS: ICD-10-CM

## 2025-01-03 DIAGNOSIS — K44.9 HIATAL HERNIA: ICD-10-CM

## 2025-01-03 RX ORDER — FAMOTIDINE 20 MG/1
20 TABLET, FILM COATED ORAL
Qty: 90 TABLET | Refills: 1 | Status: SHIPPED | OUTPATIENT
Start: 2025-01-03

## 2025-02-05 ENCOUNTER — OFFICE VISIT (OUTPATIENT)
Dept: CARDIOLOGY CLINIC | Facility: CLINIC | Age: 70
End: 2025-02-05
Payer: COMMERCIAL

## 2025-02-05 VITALS
SYSTOLIC BLOOD PRESSURE: 114 MMHG | HEIGHT: 69 IN | BODY MASS INDEX: 29.33 KG/M2 | DIASTOLIC BLOOD PRESSURE: 76 MMHG | HEART RATE: 80 BPM | WEIGHT: 198 LBS

## 2025-02-05 DIAGNOSIS — E78.2 MIXED HYPERLIPIDEMIA: Primary | ICD-10-CM

## 2025-02-05 DIAGNOSIS — I25.10 CORONARY ARTERY DISEASE INVOLVING NATIVE CORONARY ARTERY OF NATIVE HEART WITHOUT ANGINA PECTORIS: ICD-10-CM

## 2025-02-05 DIAGNOSIS — I10 ESSENTIAL HYPERTENSION: ICD-10-CM

## 2025-02-05 PROCEDURE — 99214 OFFICE O/P EST MOD 30 MIN: CPT | Performed by: PHYSICIAN ASSISTANT

## 2025-02-05 PROCEDURE — 93000 ELECTROCARDIOGRAM COMPLETE: CPT | Performed by: PHYSICIAN ASSISTANT

## 2025-02-05 NOTE — PROGRESS NOTES
[facility]   Outpatient Note  Analy Chopra  1955  [id number]  [facility] [address] [address] [phone]    Analy Chopra is a 69 y.o. female    Assessment and Plan:   Mixed hyperlipidemia  T tolerating high intensity statin therapy  Continue atorvastatin 80 mg daily    Coronary artery disease involving native coronary artery of native heart without angina pectoris  S/p cardiac cath 2020: Severe stenosis of a small D1 otherwise mild diffuse disease  Medical management to continue  On statin aspirin and amlodipine    Essential hypertension  Controlled on current medical regimen  Continue amlodipine 5 mg daily      Additional Plan:   No Medication changes made or testing ordered today.     Available lab and test results are reviewed with the patient as noted.    Return visit will be in 1 year or earlier if there are problems.     The patient is encouraged to call in the meantime if there are questions or concerns.      Subjective:   The patient is seen in follow-up today.  She has a PMH of CAD with prior cath that showed severe stenosis of a small D1 otherwise minimal CAD, HTN and HLD.  She is doing well and tolerating her medications.  She is on Norvasc statin and aspirin.    From a cardiac perspective she has no complaints of exertional chest pain shortness of breath palpitations dizziness lightheadedness or syncope.  Denies any TIA or CVA symptoms.  She has no edema orthopnea or PND.  She does have heartburn.            Social History  Social History     Tobacco Use   Smoking Status Former    Current packs/day: 0.00    Average packs/day: 1 pack/day for 30.0 years (30.0 ttl pk-yrs)    Types: Cigarettes    Start date:     Quit date:     Years since quittin.1   Smokeless Tobacco Former    Quit date: 2009   ,   Social History     Substance and Sexual Activity   Alcohol Use  Not Currently    Comment: None for 10 yrs, rarely historically   ,   Social History     Substance and Sexual Activity   Drug Use Never     Family History   Problem Relation Age of Onset    Thyroid disease Mother     Cancer Mother          92 yrs old lung cancer    Heart attack Father     No Known Problems Sister     Thyroid disease Maternal Grandmother     Cancer Maternal Grandmother     Diabetes Maternal Grandfather     Heart attack Maternal Grandfather     Heart attack Paternal Grandfather     Heart attack Brother     Heart attack Maternal Uncle        Medical and Surgical History  Past Medical History:   Diagnosis Date    Acute medial meniscus tear of right knee 2019    Depression 1969    Disease of thyroid gland     Synthroid    Essential hypertension     Exertional chest pain     Fall from slipping on ice 2022    Fractures     - rt lower leg break, rt arm, break rt. pinky finger, ribs    Heart disease     Hypothyroidism     Nausea and vomiting 2023    Neck pain, acute 2022    NSTEMI, initial episode of care ([facility]) 2020    Right lower quadrant abdominal pain 2023    SAH (subarachnoid hemorrhage) ([facility]) 2022    Stomach disorder     Traumatic ecchymosis of abdominal wall 2023    Unstable angina pectoris ([facility]) 2020     Past Surgical History:   Procedure Laterality Date    ANGIOPLASTY      CARDIAC CATHETERIZATION  2020    EF 75% There is a small LADD1 branch that has a severe stenosis. Given the small size of the vessel the plan will be to treat medically    TONSILLECTOMY           Current Outpatient Medications:     amLODIPine (NORVASC) 5 mg tablet, TAKE 1 TABLET DAILY, Disp: 90 tablet, Rfl: 1    aspirin (ECOTRIN LOW STRENGTH) 81 mg EC tablet, Take 1 tablet (81 mg total) by mouth daily, Disp: 90 tablet, Rfl: 1    atorvastatin (LIPITOR) 80 mg tablet, TAKE 1 TABLET DAILY AT BEDTIME, Disp: 90 tablet, Rfl: 1    esomeprazole (NexIUM) 20 mg capsule, Take 1  capsule (20 mg total) by mouth 2 (two) times a day before meals, Disp: 180 capsule, Rfl: 1    famotidine (PEPCID) 20 mg tablet, TAKE 1 TABLET DAILY AT BEDTIME, Disp: 90 tablet, Rfl: 1    levothyroxine 125 mcg tablet, TAKE 1 TABLET DAILY, Disp: 90 tablet, Rfl: 1    venlafaxine (EFFEXOR) 75 mg tablet, TAKE 1 TABLET TWICE A DAY, Disp: 180 tablet, Rfl: 1  Allergies   Allergen Reactions    Other Hives and Rash     Adhesive tape         Review of Systems   Constitutional: Negative.   HENT: Negative.     Eyes: Negative.    Cardiovascular: Negative.  Negative for chest pain, claudication, cyanosis, dyspnea on exertion, irregular heartbeat, leg swelling, near-syncope, orthopnea, palpitations, paroxysmal nocturnal dyspnea and syncope.   Respiratory: Negative.  Negative for cough, hemoptysis, shortness of breath, sleep disturbances due to breathing, snoring, sputum production and wheezing.    Endocrine: Negative.    Hematologic/Lymphatic: Negative.    Skin: Negative.    Musculoskeletal: Negative.    Gastrointestinal:  Positive for heartburn.   Genitourinary: Negative.    Neurological: Negative.    Psychiatric/Behavioral: Negative.     Allergic/Immunologic: Negative.        Objective:   There were no vitals taken for this visit.  Physical Exam  Vitals and nursing note reviewed.   Constitutional:       Appearance: She is well-developed.   HENT:      Head: Normocephalic and atraumatic.      Mouth/Throat:      Mouth: Mucous membranes are moist.   Eyes:      General: No scleral icterus.     Conjunctiva/sclera: Conjunctivae normal.   Neck:      Vascular: No JVD.      Trachea: No tracheal deviation.   Cardiovascular:      Rate and Rhythm: Normal rate and regular rhythm.      Pulses: Intact distal pulses.      Heart sounds: Normal heart sounds, S1 normal and S2 normal. No murmur heard.     No friction rub. No gallop.   Pulmonary:      Effort: Pulmonary effort is normal. No respiratory distress.      Breath sounds: Normal breath  "sounds. No wheezing or rales.   Chest:      Chest wall: No tenderness.   Abdominal:      General: Bowel sounds are normal. There is no distension.      Palpations: Abdomen is soft.      Tenderness: There is no abdominal tenderness.      Comments: Aorta not palpable    Musculoskeletal:         General: No tenderness. Normal range of motion.      Cervical back: Normal range of motion and neck supple.      Right lower leg: No edema.      Left lower leg: No edema.   Skin:     General: Skin is warm and dry.      Coloration: Skin is not pale.      Findings: No erythema or rash.   Neurological:      General: No focal deficit present.      Mental Status: She is alert and oriented to person, place, and time.   Psychiatric:         Mood and Affect: Mood normal.         Behavior: Behavior normal.         Judgment: Judgment normal.         Lab Review:   No results found for: \"CHOL\"  Lab Results   Component Value Date    HDL 41 (L) 12/18/2023     Lab Results   Component Value Date    LDLCALC 95 12/18/2023     Lab Results   Component Value Date    TRIG 114 12/18/2023     Results Reviewed       None          Results Reviewed       None          Results Reviewed       None            Recent Cardiovascular Testing:   No recent study    ECG Review:   2/5/2025: Normal sinus rhythm nonspecific T wave abnormality    10/24/2023 normal sinus rhythm          "

## 2025-02-05 NOTE — PATIENT INSTRUCTIONS
No changes in medication     Return in one year     Call in the meantime if you have any concerns

## 2025-02-05 NOTE — ASSESSMENT & PLAN NOTE
S/p cardiac cath 1/2020: Severe stenosis of a small D1 otherwise mild diffuse disease  Medical management to continue  On statin aspirin and amlodipine

## 2025-02-11 ENCOUNTER — APPOINTMENT (OUTPATIENT)
Dept: LAB | Facility: CLINIC | Age: 70
End: 2025-02-11
Payer: COMMERCIAL

## 2025-02-11 DIAGNOSIS — N62 HYPERTROPHY OF BREAST: Primary | ICD-10-CM

## 2025-02-11 DIAGNOSIS — Z01.812 PRE-OPERATIVE LABORATORY EXAMINATION: ICD-10-CM

## 2025-02-11 DIAGNOSIS — E78.2 MIXED HYPERLIPIDEMIA: ICD-10-CM

## 2025-02-11 DIAGNOSIS — Z01.818 OTHER SPECIFIED PRE-OPERATIVE EXAMINATION: ICD-10-CM

## 2025-02-11 LAB
ANION GAP SERPL CALCULATED.3IONS-SCNC: 6 MMOL/L (ref 4–13)
BUN SERPL-MCNC: 18 MG/DL (ref 5–25)
CALCIUM SERPL-MCNC: 9 MG/DL (ref 8.4–10.2)
CHLORIDE SERPL-SCNC: 105 MMOL/L (ref 96–108)
CHOLEST SERPL-MCNC: 145 MG/DL (ref ?–200)
CO2 SERPL-SCNC: 30 MMOL/L (ref 21–32)
CREAT SERPL-MCNC: 0.73 MG/DL (ref 0.6–1.3)
ERYTHROCYTE [DISTWIDTH] IN BLOOD BY AUTOMATED COUNT: 13.7 % (ref 11.6–15.1)
GFR SERPL CREATININE-BSD FRML MDRD: 84 ML/MIN/1.73SQ M
GLUCOSE P FAST SERPL-MCNC: 97 MG/DL (ref 65–99)
HCT VFR BLD AUTO: 43.7 % (ref 34.8–46.1)
HDLC SERPL-MCNC: 42 MG/DL
HGB BLD-MCNC: 14.4 G/DL (ref 11.5–15.4)
LDLC SERPL CALC-MCNC: 84 MG/DL (ref 0–100)
MCH RBC QN AUTO: 29.1 PG (ref 26.8–34.3)
MCHC RBC AUTO-ENTMCNC: 33 G/DL (ref 31.4–37.4)
MCV RBC AUTO: 89 FL (ref 82–98)
PLATELET # BLD AUTO: 228 THOUSANDS/UL (ref 149–390)
PMV BLD AUTO: 11.1 FL (ref 8.9–12.7)
POTASSIUM SERPL-SCNC: 3.9 MMOL/L (ref 3.5–5.3)
RBC # BLD AUTO: 4.94 MILLION/UL (ref 3.81–5.12)
SODIUM SERPL-SCNC: 141 MMOL/L (ref 135–147)
TRIGL SERPL-MCNC: 93 MG/DL (ref ?–150)
WBC # BLD AUTO: 7.47 THOUSAND/UL (ref 4.31–10.16)

## 2025-02-11 PROCEDURE — 80048 BASIC METABOLIC PNL TOTAL CA: CPT

## 2025-02-11 PROCEDURE — 80061 LIPID PANEL: CPT

## 2025-02-11 PROCEDURE — 85027 COMPLETE CBC AUTOMATED: CPT

## 2025-02-11 PROCEDURE — 36415 COLL VENOUS BLD VENIPUNCTURE: CPT

## 2025-02-12 ENCOUNTER — RESULTS FOLLOW-UP (OUTPATIENT)
Dept: CARDIOLOGY CLINIC | Facility: CLINIC | Age: 70
End: 2025-02-12

## 2025-02-14 NOTE — PRE-PROCEDURE INSTRUCTIONS
Pre-Surgery Instructions:   Medication Instructions    amLODIPine (NORVASC) 5 mg tablet Take day of surgery.    aspirin (ECOTRIN LOW STRENGTH) 81 mg EC tablet Instructions provided by MD    atorvastatin (LIPITOR) 80 mg tablet Take night before surgery    famotidine (PEPCID) 20 mg tablet Take day of surgery.    levothyroxine 125 mcg tablet Take day of surgery.    venlafaxine (EFFEXOR) 75 mg tablet Take day of surgery.    Medication instructions for day surgery reviewed. Please use only a sip of water to take your instructed medications. Avoid all over the counter vitamins, supplements and NSAIDS for one week prior to surgery per anesthesia guidelines. Tylenol is ok to take as needed.     You will receive a call one business day prior to surgery with an arrival time and hospital directions. If your surgery is scheduled on a Monday, the hospital will be calling you on the Friday prior to your surgery. If you have not heard from anyone by 8pm, please call the hospital supervisor through the hospital  at [phone]. ([place] [phone] or [place] [phone]).    Do not eat or drink anything after midnight the night before your surgery, including candy, mints, lifesavers, or chewing gum. Do not drink alcohol 24hrs before your surgery. Try not to smoke at least 24hrs before your surgery.       Follow the pre surgery showering instructions as listed in the “My Surgical Experience Booklet” or otherwise provided by your surgeon's office. Do not use a blade to shave the surgical area 1 week before surgery. It is okay to use a clean electric clippers up to 24 hours before surgery. Do not apply any lotions, creams, including makeup, cologne, deodorant, or perfumes after showering on the day of your surgery. Do not use dry shampoo, hair spray, hair gel, or any type of hair products.     No contact lenses, eye make-up, or artificial eyelashes. Remove nail polish, including gel polish, and any artificial, gel, or  acrylic nails if possible. Remove all jewelry including rings and body piercing jewelry.     Wear causal clothing that is easy to take on and off. Consider your type of surgery.    Keep any valuables, jewelry, piercings at home. Please bring any specially ordered equipment (sling, braces) if indicated.    Arrange for a responsible person to drive you to and from the hospital on the day of your surgery. Please confirm the visitor policy for the day of your procedure when you receive your phone call with an arrival time.     Call the surgeon's office with any new illnesses, exposures, or additional questions prior to surgery.    Please reference your “My Surgical Experience Booklet” for additional information to prepare for your upcoming surgery.

## 2025-02-18 DIAGNOSIS — E03.9 HYPOTHYROIDISM, UNSPECIFIED TYPE: ICD-10-CM

## 2025-02-18 DIAGNOSIS — F32.A DEPRESSIVE DISORDER: ICD-10-CM

## 2025-02-18 DIAGNOSIS — I10 ESSENTIAL HYPERTENSION: ICD-10-CM

## 2025-02-18 DIAGNOSIS — F41.9 ANXIETY: ICD-10-CM

## 2025-02-18 RX ORDER — AMLODIPINE BESYLATE 5 MG/1
5 TABLET ORAL DAILY
Qty: 90 TABLET | Refills: 1 | Status: SHIPPED | OUTPATIENT
Start: 2025-02-18

## 2025-02-18 RX ORDER — LEVOTHYROXINE SODIUM 125 UG/1
125 TABLET ORAL DAILY
Qty: 90 TABLET | Refills: 1 | Status: SHIPPED | OUTPATIENT
Start: 2025-02-18

## 2025-02-18 RX ORDER — VENLAFAXINE 75 MG/1
75 TABLET ORAL 2 TIMES DAILY
Qty: 180 TABLET | Refills: 1 | Status: SHIPPED | OUTPATIENT
Start: 2025-02-18

## 2025-02-23 ENCOUNTER — ANESTHESIA EVENT (OUTPATIENT)
Dept: PERIOP | Facility: HOSPITAL | Age: 70
End: 2025-02-23
Payer: COMMERCIAL

## 2025-02-25 ENCOUNTER — ANESTHESIA (OUTPATIENT)
Dept: PERIOP | Facility: HOSPITAL | Age: 70
End: 2025-02-25
Payer: COMMERCIAL

## 2025-02-25 ENCOUNTER — HOSPITAL ENCOUNTER (OUTPATIENT)
Facility: HOSPITAL | Age: 70
Setting detail: OUTPATIENT SURGERY
Discharge: HOME | End: 2025-02-25
Attending: SURGERY | Admitting: SURGERY
Payer: COMMERCIAL

## 2025-02-25 VITALS
DIASTOLIC BLOOD PRESSURE: 96 MMHG | BODY MASS INDEX: 29.33 KG/M2 | HEIGHT: 69 IN | SYSTOLIC BLOOD PRESSURE: 167 MMHG | OXYGEN SATURATION: 92 % | RESPIRATION RATE: 14 BRPM | TEMPERATURE: 97.2 F | HEART RATE: 89 BPM | WEIGHT: 198 LBS

## 2025-02-25 DIAGNOSIS — N62 HYPERTROPHY OF BREAST: ICD-10-CM

## 2025-02-25 DIAGNOSIS — M54.89 OTHER BACK PAIN, UNSPECIFIED CHRONICITY: ICD-10-CM

## 2025-02-25 PROBLEM — M25.551 HIP PAIN, BILATERAL: Status: ACTIVE | Noted: 2025-02-25

## 2025-02-25 PROBLEM — M25.552 HIP PAIN, BILATERAL: Status: ACTIVE | Noted: 2025-02-25

## 2025-02-25 PROCEDURE — 88342 IMHCHEM/IMCYTCHM 1ST ANTB: CPT | Performed by: PATHOLOGY

## 2025-02-25 PROCEDURE — 88305 TISSUE EXAM BY PATHOLOGIST: CPT | Performed by: PATHOLOGY

## 2025-02-25 RX ORDER — MAGNESIUM HYDROXIDE 1200 MG/15ML
LIQUID ORAL AS NEEDED
Status: DISCONTINUED | OUTPATIENT
Start: 2025-02-25 | End: 2025-02-25 | Stop reason: HOSPADM

## 2025-02-25 RX ORDER — ONDANSETRON 2 MG/ML
INJECTION INTRAMUSCULAR; INTRAVENOUS AS NEEDED
Status: DISCONTINUED | OUTPATIENT
Start: 2025-02-25 | End: 2025-02-25

## 2025-02-25 RX ORDER — DEXAMETHASONE SODIUM PHOSPHATE 10 MG/ML
INJECTION, SOLUTION INTRAMUSCULAR; INTRAVENOUS AS NEEDED
Status: DISCONTINUED | OUTPATIENT
Start: 2025-02-25 | End: 2025-02-25

## 2025-02-25 RX ORDER — PROPOFOL 10 MG/ML
INJECTION, EMULSION INTRAVENOUS AS NEEDED
Status: DISCONTINUED | OUTPATIENT
Start: 2025-02-25 | End: 2025-02-25

## 2025-02-25 RX ORDER — FAMOTIDINE 10 MG/ML
20 INJECTION, SOLUTION INTRAVENOUS ONCE AS NEEDED
Status: COMPLETED | OUTPATIENT
Start: 2025-02-25 | End: 2025-02-25

## 2025-02-25 RX ORDER — SODIUM CHLORIDE, SODIUM LACTATE, POTASSIUM CHLORIDE, CALCIUM CHLORIDE 600; 310; 30; 20 MG/100ML; MG/100ML; MG/100ML; MG/100ML
125 INJECTION, SOLUTION INTRAVENOUS CONTINUOUS
Status: DISCONTINUED | OUTPATIENT
Start: 2025-02-25 | End: 2025-02-25 | Stop reason: HOSPADM

## 2025-02-25 RX ORDER — CEFAZOLIN SODIUM 2 G/50ML
2000 SOLUTION INTRAVENOUS ONCE
Status: DISCONTINUED | OUTPATIENT
Start: 2025-02-25 | End: 2025-02-25 | Stop reason: HOSPADM

## 2025-02-25 RX ORDER — OXYCODONE AND ACETAMINOPHEN 5; 325 MG/1; MG/1
1 TABLET ORAL EVERY 4 HOURS PRN
Refills: 0 | Status: DISCONTINUED | OUTPATIENT
Start: 2025-02-25 | End: 2025-02-25 | Stop reason: HOSPADM

## 2025-02-25 RX ORDER — ONDANSETRON 2 MG/ML
4 INJECTION INTRAMUSCULAR; INTRAVENOUS ONCE AS NEEDED
Status: DISCONTINUED | OUTPATIENT
Start: 2025-02-25 | End: 2025-02-25 | Stop reason: HOSPADM

## 2025-02-25 RX ORDER — LIDOCAINE HYDROCHLORIDE 10 MG/ML
INJECTION, SOLUTION EPIDURAL; INFILTRATION; INTRACAUDAL; PERINEURAL AS NEEDED
Status: DISCONTINUED | OUTPATIENT
Start: 2025-02-25 | End: 2025-02-25

## 2025-02-25 RX ORDER — HYDROMORPHONE HYDROCHLORIDE 1 MG/ML
INJECTION, SOLUTION INTRAMUSCULAR; INTRAVENOUS; SUBCUTANEOUS AS NEEDED
Status: DISCONTINUED | OUTPATIENT
Start: 2025-02-25 | End: 2025-02-25

## 2025-02-25 RX ORDER — FENTANYL CITRATE 50 UG/ML
INJECTION, SOLUTION INTRAMUSCULAR; INTRAVENOUS AS NEEDED
Status: DISCONTINUED | OUTPATIENT
Start: 2025-02-25 | End: 2025-02-25

## 2025-02-25 RX ORDER — CEFAZOLIN SODIUM 2 G/50ML
SOLUTION INTRAVENOUS AS NEEDED
Status: DISCONTINUED | OUTPATIENT
Start: 2025-02-25 | End: 2025-02-25

## 2025-02-25 RX ORDER — FENTANYL CITRATE 50 UG/ML
50 INJECTION, SOLUTION INTRAMUSCULAR; INTRAVENOUS
Status: DISCONTINUED | OUTPATIENT
Start: 2025-02-25 | End: 2025-02-25 | Stop reason: HOSPADM

## 2025-02-25 RX ORDER — ACETAMINOPHEN 325 MG/1
650 TABLET ORAL EVERY 6 HOURS PRN
Status: DISCONTINUED | OUTPATIENT
Start: 2025-02-25 | End: 2025-02-25 | Stop reason: HOSPADM

## 2025-02-25 RX ORDER — SODIUM CHLORIDE, SODIUM LACTATE, POTASSIUM CHLORIDE, CALCIUM CHLORIDE 600; 310; 30; 20 MG/100ML; MG/100ML; MG/100ML; MG/100ML
INJECTION, SOLUTION INTRAVENOUS CONTINUOUS PRN
Status: DISCONTINUED | OUTPATIENT
Start: 2025-02-25 | End: 2025-02-25

## 2025-02-25 RX ORDER — MIDAZOLAM HYDROCHLORIDE 2 MG/2ML
INJECTION, SOLUTION INTRAMUSCULAR; INTRAVENOUS AS NEEDED
Status: DISCONTINUED | OUTPATIENT
Start: 2025-02-25 | End: 2025-02-25

## 2025-02-25 RX ORDER — BUPIVACAINE HYDROCHLORIDE AND EPINEPHRINE 2.5; 5 MG/ML; UG/ML
INJECTION, SOLUTION EPIDURAL; INFILTRATION; INTRACAUDAL; PERINEURAL AS NEEDED
Status: DISCONTINUED | OUTPATIENT
Start: 2025-02-25 | End: 2025-02-25 | Stop reason: HOSPADM

## 2025-02-25 RX ORDER — PROPOFOL 10 MG/ML
INJECTION, EMULSION INTRAVENOUS CONTINUOUS PRN
Status: DISCONTINUED | OUTPATIENT
Start: 2025-02-25 | End: 2025-02-25

## 2025-02-25 RX ORDER — ROCURONIUM BROMIDE 10 MG/ML
INJECTION, SOLUTION INTRAVENOUS AS NEEDED
Status: DISCONTINUED | OUTPATIENT
Start: 2025-02-25 | End: 2025-02-25

## 2025-02-25 RX ORDER — ONDANSETRON 4 MG/1
4 TABLET, ORALLY DISINTEGRATING ORAL ONCE AS NEEDED
Status: DISCONTINUED | OUTPATIENT
Start: 2025-02-25 | End: 2025-02-25 | Stop reason: HOSPADM

## 2025-02-25 RX ORDER — TRANEXAMIC ACID 100 MG/ML
INJECTION, SOLUTION INTRAVENOUS AS NEEDED
Status: DISCONTINUED | OUTPATIENT
Start: 2025-02-25 | End: 2025-02-25 | Stop reason: HOSPADM

## 2025-02-25 RX ADMIN — FAMOTIDINE 20 MG: 10 INJECTION, SOLUTION INTRAVENOUS at 18:01

## 2025-02-25 RX ADMIN — SODIUM CHLORIDE, SODIUM LACTATE, POTASSIUM CHLORIDE, AND CALCIUM CHLORIDE: .6; .31; .03; .02 INJECTION, SOLUTION INTRAVENOUS at 10:57

## 2025-02-25 RX ADMIN — PROPOFOL 100 MCG/KG/MIN: 10 INJECTION, EMULSION INTRAVENOUS at 11:20

## 2025-02-25 RX ADMIN — FENTANYL CITRATE 50 MCG: 50 INJECTION, SOLUTION INTRAMUSCULAR; INTRAVENOUS at 11:14

## 2025-02-25 RX ADMIN — FENTANYL CITRATE 50 MCG: 50 INJECTION, SOLUTION INTRAMUSCULAR; INTRAVENOUS at 11:53

## 2025-02-25 RX ADMIN — LIDOCAINE HYDROCHLORIDE 50 MG: 10 SOLUTION INTRAVENOUS at 11:14

## 2025-02-25 RX ADMIN — ROCURONIUM BROMIDE 20 MG: 10 INJECTION INTRAVENOUS at 12:10

## 2025-02-25 RX ADMIN — MIDAZOLAM 2 MG: 1 INJECTION INTRAMUSCULAR; INTRAVENOUS at 11:05

## 2025-02-25 RX ADMIN — ONDANSETRON 4 MG: 2 INJECTION INTRAMUSCULAR; INTRAVENOUS at 11:05

## 2025-02-25 RX ADMIN — PROPOFOL 200 MG: 10 INJECTION, EMULSION INTRAVENOUS at 11:14

## 2025-02-25 RX ADMIN — SUGAMMADEX 300 MG: 100 INJECTION, SOLUTION INTRAVENOUS at 14:34

## 2025-02-25 RX ADMIN — SODIUM CHLORIDE, SODIUM LACTATE, POTASSIUM CHLORIDE, AND CALCIUM CHLORIDE: .6; .31; .03; .02 INJECTION, SOLUTION INTRAVENOUS at 12:24

## 2025-02-25 RX ADMIN — DEXAMETHASONE SODIUM PHOSPHATE 10 MG: 10 INJECTION, SOLUTION INTRAMUSCULAR; INTRAVENOUS at 11:17

## 2025-02-25 RX ADMIN — HYDROMORPHONE HYDROCHLORIDE 0.5 MG: 1 INJECTION, SOLUTION INTRAMUSCULAR; INTRAVENOUS; SUBCUTANEOUS at 12:33

## 2025-02-25 RX ADMIN — HYDROMORPHONE HYDROCHLORIDE 0.5 MG: 1 INJECTION, SOLUTION INTRAMUSCULAR; INTRAVENOUS; SUBCUTANEOUS at 11:41

## 2025-02-25 RX ADMIN — CEFAZOLIN SODIUM 2000 MG: 2 SOLUTION INTRAVENOUS at 11:19

## 2025-02-25 RX ADMIN — ROCURONIUM BROMIDE 50 MG: 10 INJECTION INTRAVENOUS at 11:14

## 2025-02-25 RX ADMIN — ROCURONIUM BROMIDE 30 MG: 10 INJECTION INTRAVENOUS at 13:01

## 2025-02-25 NOTE — INTERVAL H&P NOTE
H&P reviewed. After examining the patient I find no changes in the patients condition since the H&P had been written.    Vitals:    02/25/25 0755   BP: 141/99   Pulse: 80   Resp: 18   Temp: (!) 97 °F (36.1 °C)   SpO2: 96%     To OR for bilateral breast reduction

## 2025-02-25 NOTE — NURSING NOTE
Pt is awake,alert,feeling better. Assisted OOB to BR, voided QS. Dr Valle is aware of pt's BP, no further orders, pt to take her own BP medication at home. Pt tolerated diet, written and verbal instructions given to pt including drain care and documenting on drain flow sheet. Pt verbalizes an understanding of all instructions.

## 2025-02-25 NOTE — ANESTHESIA PREPROCEDURE EVALUATION
Procedure:  BREAST REDUCTION (Bilateral: Breast)    Relevant Problems   CARDIO   (+) Coronary artery disease involving native coronary artery of native heart without angina pectoris   (+) Essential hypertension   (+) Hot flashes   (+) Mixed hyperlipidemia      ENDO   (+) Hypothyroidism      GI/HEPATIC   (+) GERD (gastroesophageal reflux disease)   (+) Hiatal hernia      MUSCULOSKELETAL   (+) Hiatal hernia      NEURO/PSYCH   (+) Anxiety   (+) Depressive disorder      PULMONARY   (+) Pulmonary emphysema, unspecified emphysema type (HCC)      Orthopedic/Musculoskeletal   (+) Hip pain, bilateral      FEN/Gastrointestinal   (+) Nausea and vomiting      Other   (+) History of non-ST elevation myocardial infarction (NSTEMI)   (+) History of seizure        Physical Exam    Airway    Mallampati score: II  TM Distance: >3 FB  Neck ROM: full     Dental   No notable dental hx     Cardiovascular  Cardiovascular exam normal    Pulmonary  Pulmonary exam normal     Other Findings  post-pubertal.      Anesthesia Plan  ASA Score- 3     Anesthesia Type- general with ASA Monitors.         Additional Monitors:     Airway Plan: ETT.           Plan Factors-Exercise tolerance (METS): >4 METS.    Chart reviewed. EKG reviewed.  Existing labs reviewed.     Patient is a current smoker.  Patient instructed to abstain from smoking on day of procedure. Patient did not smoke on day of surgery.            Induction- intravenous and rapid sequence induction.    Postoperative Plan-         Informed Consent- Anesthetic plan and risks discussed with patient.  I personally reviewed this patient with the CRNA. Discussed and agreed on the Anesthesia Plan with the CRNA..      NPO Status:  No vitals data found for the desired time range.

## 2025-02-25 NOTE — DISCHARGE INSTR - AVS FIRST PAGE
Keep incisions clean. OK to shower starting tomorrow, do not let the water hit the area directly. Wear the bra at all times after surgery until your follow up appointment, except to take a shower. No heavy lifting, limit weight to 10 lbs. Do not raise your arms more higher than your shoulder height. Measure and record drain output twice daily, and bring this log to your follow up appointment.    Take Tylenol and ibuprofen as needed for pain. If needed, you may take Percocet, just ensure that total Tylenol dose does not exceeed 4,000mg in a day.    Follow up in the office on 2/28/25.    If you have any questions or concerns, please call the office.

## 2025-02-25 NOTE — ANESTHESIA POSTPROCEDURE EVALUATION
Post-Op Assessment Note    CV Status:  Stable  Pain Score: 0    Pain management: adequate       Mental Status:  Sleepy   Hydration Status:  Stable   PONV Controlled:  None   Airway Patency:  Patent  Airway: intubated  There is a medical reason for not screening for obstructive sleep apnea and/or for not using two or more mitigation strategies   Post Op Vitals Reviewed: Yes    No anethesia notable event occurred.    Staff: CRNA           Last Filed PACU Vitals:  Vitals Value Taken Time   Temp     Pulse 93 02/25/25 1450   /102    Resp 13 02/25/25 1450   SpO2 95 % 02/25/25 1450   Vitals shown include unfiled device data.

## 2025-02-25 NOTE — ANESTHESIA POSTPROCEDURE EVALUATION
Post-Op Assessment Note    CV Status:  Stable    Pain management: adequate       Mental Status:  Alert and awake   Hydration Status:  Euvolemic   PONV Controlled:  Controlled   Airway Patency:  Patent     Post Op Vitals Reviewed: Yes    No anethesia notable event occurred.    Staff: Anesthesiologist           Last Filed PACU Vitals:  Vitals Value Taken Time   Temp 98.6 °F (37 °C) 02/25/25 1519   Pulse 87 02/25/25 1524   /90 02/25/25 1519   Resp 15 02/25/25 1524   SpO2 92 % 02/25/25 1524   Vitals shown include unfiled device data.    Modified Renata:     Vitals Value Taken Time   Activity 2 02/25/25 1519   Respiration 2 02/25/25 1519   Circulation 2 02/25/25 1519   Consciousness 1 02/25/25 1519   Oxygen Saturation 2 02/25/25 1519     Modified Renata Score: 9

## 2025-02-25 NOTE — OP NOTE
OPERATIVE REPORT  PATIENT NAME: Analy Chopra    :  1955  MRN: [id number]  Pt Location:  OR ROOM 08    SURGERY DATE: 2025    Surgeons and Role:     * Lucien Hubbard MD - Primary     * Aden Savage CST - Assisting    Preop Diagnosis:  Hypertrophy of breast [N62]  Other back pain, unspecified chronicity [M54.89]    Post-Op Diagnosis Codes:     * Hypertrophy of breast [N62]     * Other back pain, unspecified chronicity [M54.89]    Procedure(s):  Bilateral - BREAST REDUCTION    Specimen(s):  ID Type Source Tests Collected by Time Destination   1 : breast reduction tissue Tissue Breast, Left TISSUE EXAM Lucien Hubbard MD 2025 1146    2 : breast reduction tissue Tissue Breast, Right TISSUE EXAM Lucien Hubbard MD 2025 1146        Estimated Blood Loss:   Minimal    Drains:  Closed/Suction Drain Right;Lateral Breast Bulb 15 Fr. (Active)   Number of days: 0       Closed/Suction Drain Lateral;Left Breast Bulb 15 Fr. (Active)   Number of days: 0       Anesthesia Type:   General    Operative Indications:  Hypertrophy of breast [N62]  Other back pain, unspecified chronicity [M54.89]  This is a 70 year old female who presents with symptomatic macromastia. She now presents for bilateral breast reduction. Risks, benefits and alternatives were discussed with the patient.    Operative Findings:  Right 605g, Left 631g      Complications:   None    Procedure and Technique:  The patient was met in the preoperative holding area and marked according to hospital policy. A Hernandez pattern breast reduction was planned and the patient was marked accordingly. The patient was brought to the operating room and positioned carefully on the table, all pressure points were adequately padded. Sequential compression devices (SCD) were placed on the legs for mechanical thromboembolic prophylaxis against DVT. IV antibiotics were administered. Anesthesia was induced and the surgical site was  prepped and draped in the usual sterile fashion. An OR pause was performed to confirm the correct patient, procedure, and fire safety risk. Local anesthesia consisting of 1% lidocaine with epinephrine (1:100,000) with 0.25% marcaine was injected into the planned incisions for post-op pain relief.     The breast reduction was planned with an inferior pedicle Wise pattern technique. The incisions were made with a scalpel. A tourniquet was applied to the base of the breast.  A 38mm cookie cutter was also used to score the nipple areolar complex.  The intervening skin was de-epithelialized sharply with a 10 blade to create a inferiorly-based pedicle of perfusion to the NAC. The tourniquet was released. The pedicle was carefully dissected with bovie to preserve blood supply to tissues including NAC.  The redundant breast skin, fat, and glandular tissue was resected and sent to pathology. The open wound of breast was copiously irrigated and inspected meticulously for hemostasis, which was achieved with Bovie electrocautery. A drain was placed. The Hernandez pattern inverted-T incision was then tailor tacked closed over the pedicle. The nipple areolar complex was then delivered and inset into the proper position along the breast meridian.  The vertical and horizontal incisions were then closed with 3-0 PDS sutures in a deep dermal fashion. The vertical incision was closed with 4-0 monocryl suture, and the horizontal incision was closed with 3-0 Stratifix suture.  The nipple areolar complex was also closed carefully and circumferentially utilizing 3-0 monocryl and 4-0 monocryl. The breast skin and the nipple-areolar complex appeared pink and viable. An identical procedure was performed on the other side. There was excellent symmetry noted between the two breasts at the end of the operation. The final specimen weights were: right side 605g and left side 631g.   Skin glue and steri strips were applied to the incisions.    All  sponge, needle and instrument counts were correct at the end of the case. The patient tolerated the procedure without complications and was transferred to the recovery room in stable condition. I was the attending plastic surgeon for the entire procedure.      I was present for the entire procedure.    Patient Disposition:  PACU              SIGNATURE: Lucien Hubbard MD  DATE: February 25, 2025  TIME: 2:52 PM

## 2025-02-25 NOTE — NURSING NOTE
Pt returned to [facility] drowsy, but awakens easily, 3/10 operative discomfort, surgi bra in place. HOB elevated, taking water.

## 2025-02-28 PROCEDURE — 88342 IMHCHEM/IMCYTCHM 1ST ANTB: CPT | Performed by: PATHOLOGY

## 2025-02-28 PROCEDURE — 88305 TISSUE EXAM BY PATHOLOGIST: CPT | Performed by: PATHOLOGY

## 2025-03-10 DIAGNOSIS — E78.2 MIXED HYPERLIPIDEMIA: ICD-10-CM

## 2025-03-11 RX ORDER — ATORVASTATIN CALCIUM 80 MG/1
80 TABLET, FILM COATED ORAL
Qty: 90 TABLET | Refills: 1 | Status: SHIPPED | OUTPATIENT
Start: 2025-03-11

## 2025-03-26 ENCOUNTER — RA CDI HCC (OUTPATIENT)
Dept: OTHER | Facility: HOSPITAL | Age: 70
End: 2025-03-26

## 2025-04-01 ENCOUNTER — OFFICE VISIT (OUTPATIENT)
Dept: FAMILY MEDICINE CLINIC | Facility: CLINIC | Age: 70
End: 2025-04-01
Payer: COMMERCIAL

## 2025-04-01 VITALS
BODY MASS INDEX: 28.88 KG/M2 | TEMPERATURE: 96.9 F | HEIGHT: 69 IN | SYSTOLIC BLOOD PRESSURE: 128 MMHG | OXYGEN SATURATION: 98 % | RESPIRATION RATE: 18 BRPM | HEART RATE: 73 BPM | DIASTOLIC BLOOD PRESSURE: 78 MMHG | WEIGHT: 195 LBS

## 2025-04-01 DIAGNOSIS — R21 SCALY PATCH RASH: ICD-10-CM

## 2025-04-01 DIAGNOSIS — H01.132: ICD-10-CM

## 2025-04-01 DIAGNOSIS — F32.A DEPRESSIVE DISORDER: ICD-10-CM

## 2025-04-01 DIAGNOSIS — Z12.12 SCREENING FOR COLORECTAL CANCER: ICD-10-CM

## 2025-04-01 DIAGNOSIS — E78.2 MIXED HYPERLIPIDEMIA: ICD-10-CM

## 2025-04-01 DIAGNOSIS — H01.135: ICD-10-CM

## 2025-04-01 DIAGNOSIS — R73.03 PREDIABETES: ICD-10-CM

## 2025-04-01 DIAGNOSIS — F41.9 ANXIETY: Primary | ICD-10-CM

## 2025-04-01 DIAGNOSIS — J43.9 PULMONARY EMPHYSEMA, UNSPECIFIED EMPHYSEMA TYPE (HCC): ICD-10-CM

## 2025-04-01 DIAGNOSIS — E03.9 HYPOTHYROIDISM, UNSPECIFIED TYPE: ICD-10-CM

## 2025-04-01 DIAGNOSIS — I10 ESSENTIAL HYPERTENSION: ICD-10-CM

## 2025-04-01 DIAGNOSIS — Z12.11 SCREENING FOR COLORECTAL CANCER: ICD-10-CM

## 2025-04-01 PROBLEM — R11.2 NAUSEA AND VOMITING: Status: RESOLVED | Noted: 2023-12-12 | Resolved: 2025-04-01

## 2025-04-01 PROCEDURE — G2211 COMPLEX E/M VISIT ADD ON: HCPCS | Performed by: FAMILY MEDICINE

## 2025-04-01 PROCEDURE — 99214 OFFICE O/P EST MOD 30 MIN: CPT | Performed by: FAMILY MEDICINE

## 2025-04-01 RX ORDER — TRIAMCINOLONE ACETONIDE 1 MG/G
CREAM TOPICAL 2 TIMES DAILY PRN
Qty: 15 G | Refills: 1 | Status: SHIPPED | OUTPATIENT
Start: 2025-04-01

## 2025-04-01 RX ORDER — OXYCODONE AND ACETAMINOPHEN 5; 325 MG/1; MG/1
1 TABLET ORAL
COMMUNITY
Start: 2025-02-18 | End: 2025-04-01 | Stop reason: ALTCHOICE

## 2025-04-01 NOTE — PROGRESS NOTES
"Name: Analy Chopra      : 1955      MRN: [id number]  Encounter Provider: Betina Membreno DO  Encounter Date: 2025   Encounter department: [facility] [place]  :  Assessment & Plan  Anxiety  Stable, controlled, cpm       Depressive disorder  Stable, controlled, cpm         Essential hypertension  Ideally controlled, cpm  Orders:    Albumin / creatinine urine ratio; Future    Hypothyroidism, unspecified type  Stable, cpm  Orders:    TSH, 3rd generation; Future    T4, free; Future    Mixed hyperlipidemia  cpm       Pulmonary emphysema, unspecified emphysema type (HCC)  Stable, cpm       Screening for colorectal cancer    Orders:    Cologuard    Prediabetes  Stable, cpm  Orders:    Hemoglobin A1C; Future    Scaly patch rash  Upper abd - Possibly eczema  Orders:    triamcinolone (KENALOG) 0.1 % cream; Apply topically 2 (two) times a day as needed for rash    Eczematous dermatitis, lower eyelids, bilateral  Advised cool  50-50 vinegar/water soaks prn         Chief Complaint   Patient presents with    Follow-up     6 mo f/u           Falls Plan of Care: balance, strength, and gait training instructions were provided.     History of Present Illness   Scheduled f/u  Had b/l breast reduction surgery 25 - has f/u w/ plastic surgeon in 3 days- will ask surgeon when it will be ok to do mammo  States had COVID recently- recovered  C/o Get jolts of strange pains since the surgery  - lasting just a second   States otherwise doing well, but noticed a small spot of a skin rash upper abd recently, would like it looked at to find out what it is, not pruritic      Review of Systems   Constitutional: Negative.    Respiratory: Negative.     Cardiovascular: Negative.        Objective   /78 (BP Location: Left arm, Patient Position: Sitting, Cuff Size: Standard)   Pulse 73   Temp (!) 96.9 °F (36.1 °C) (Tympanic Core)   Resp 18   Ht 5' 9\" (1.753 m)   Wt 88.5 kg (195 lb)   SpO2 98%   BMI 28.80 " kg/m²      Physical Exam  Vitals and nursing note reviewed.   Constitutional:       General: She is not in acute distress.     Appearance: She is well-groomed. She is not ill-appearing, toxic-appearing or diaphoretic.   HENT:      Head: Normocephalic and atraumatic.      Nose: Nose normal.      Mouth/Throat:      Mouth: Mucous membranes are moist.   Eyes:      Conjunctiva/sclera: Conjunctivae normal.   Cardiovascular:      Rate and Rhythm: Normal rate and regular rhythm.      Heart sounds: Normal heart sounds.   Pulmonary:      Effort: Pulmonary effort is normal.      Breath sounds: Normal breath sounds and air entry.   Musculoskeletal:      Right lower leg: No edema.      Left lower leg: No edema.   Skin:     General: Skin is warm and dry.      Coloration: Skin is not pale.      Comments: Pt shows upper abd small approx 1cm oval faintly erythematous patch rash with fine scale, and shows her surgical scars b/l breasts s/p reduction mammoplasty b/l 2/25/25 - well-healed   Neurological:      Mental Status: She is alert and oriented to person, place, and time.   Psychiatric:         Mood and Affect: Mood normal.         Behavior: Behavior normal. Behavior is cooperative.         Cognition and Memory: Cognition normal.         Depression Screening Follow-up Plan: Patient's depression screening was positive with a PHQ-9 score of 5. Patient with underlying depression and was advised to continue current medications as prescribed.

## 2025-04-16 LAB — COLOGUARD RESULT REPORTABLE: NEGATIVE

## 2025-05-22 ENCOUNTER — APPOINTMENT (OUTPATIENT)
Dept: LAB | Facility: CLINIC | Age: 70
End: 2025-05-22
Payer: COMMERCIAL

## 2025-05-22 DIAGNOSIS — I10 ESSENTIAL HYPERTENSION: ICD-10-CM

## 2025-05-22 DIAGNOSIS — R73.03 PREDIABETES: ICD-10-CM

## 2025-05-22 DIAGNOSIS — E03.9 HYPOTHYROIDISM, UNSPECIFIED TYPE: ICD-10-CM

## 2025-05-22 LAB
CREAT UR-MCNC: 74.8 MG/DL
EST. AVERAGE GLUCOSE BLD GHB EST-MCNC: 123 MG/DL
HBA1C MFR BLD: 5.9 %
MICROALBUMIN UR-MCNC: 93.6 MG/L
MICROALBUMIN/CREAT 24H UR: 125 MG/G CREATININE (ref 0–30)
T4 FREE SERPL-MCNC: 0.72 NG/DL (ref 0.61–1.12)
TSH SERPL DL<=0.05 MIU/L-ACNC: 5.27 UIU/ML (ref 0.45–4.5)

## 2025-05-22 PROCEDURE — 36415 COLL VENOUS BLD VENIPUNCTURE: CPT

## 2025-05-22 PROCEDURE — 84439 ASSAY OF FREE THYROXINE: CPT

## 2025-05-22 PROCEDURE — 82570 ASSAY OF URINE CREATININE: CPT

## 2025-05-22 PROCEDURE — 84443 ASSAY THYROID STIM HORMONE: CPT

## 2025-05-22 PROCEDURE — 82043 UR ALBUMIN QUANTITATIVE: CPT

## 2025-05-22 PROCEDURE — 83036 HEMOGLOBIN GLYCOSYLATED A1C: CPT

## 2025-07-02 DIAGNOSIS — K21.9 GASTROESOPHAGEAL REFLUX DISEASE WITHOUT ESOPHAGITIS: ICD-10-CM

## 2025-07-02 DIAGNOSIS — K44.9 HIATAL HERNIA: ICD-10-CM

## 2025-07-02 RX ORDER — FAMOTIDINE 20 MG/1
20 TABLET, FILM COATED ORAL
Qty: 90 TABLET | Refills: 1 | Status: SHIPPED | OUTPATIENT
Start: 2025-07-02

## 2025-07-31 ENCOUNTER — HOSPITAL ENCOUNTER (EMERGENCY)
Facility: HOSPITAL | Age: 70
Discharge: HOME | End: 2025-08-01
Attending: EMERGENCY MEDICINE | Admitting: EMERGENCY MEDICINE
Payer: COMMERCIAL

## 2025-07-31 ENCOUNTER — APPOINTMENT (EMERGENCY)
Dept: CT IMAGING | Facility: HOSPITAL | Age: 70
End: 2025-07-31
Payer: COMMERCIAL

## 2025-07-31 DIAGNOSIS — R11.2 NAUSEA AND VOMITING: ICD-10-CM

## 2025-07-31 DIAGNOSIS — R42 VERTIGO: Primary | ICD-10-CM

## 2025-07-31 PROBLEM — R23.2 HOT FLASHES: Status: RESOLVED | Noted: 2023-01-10 | Resolved: 2025-07-31

## 2025-07-31 PROBLEM — R40.0 DAYTIME SLEEPINESS: Status: RESOLVED | Noted: 2022-10-28 | Resolved: 2025-07-31

## 2025-07-31 LAB
ALBUMIN SERPL BCG-MCNC: 4.4 G/DL (ref 3.5–5)
ALP SERPL-CCNC: 94 U/L (ref 34–104)
ALT SERPL W P-5'-P-CCNC: 37 U/L (ref 7–52)
ANION GAP SERPL CALCULATED.3IONS-SCNC: 9 MMOL/L (ref 4–13)
AST SERPL W P-5'-P-CCNC: 25 U/L (ref 13–39)
BASOPHILS # BLD AUTO: 0.03 THOUSANDS/ÂΜL (ref 0–0.1)
BASOPHILS NFR BLD AUTO: 0 % (ref 0–1)
BILIRUB SERPL-MCNC: 0.72 MG/DL (ref 0.2–1)
BUN SERPL-MCNC: 16 MG/DL (ref 5–25)
CALCIUM SERPL-MCNC: 9.7 MG/DL (ref 8.4–10.2)
CHLORIDE SERPL-SCNC: 107 MMOL/L (ref 96–108)
CO2 SERPL-SCNC: 25 MMOL/L (ref 21–32)
CREAT SERPL-MCNC: 0.74 MG/DL (ref 0.6–1.3)
EOSINOPHIL # BLD AUTO: 0.05 THOUSAND/ÂΜL (ref 0–0.61)
EOSINOPHIL NFR BLD AUTO: 1 % (ref 0–6)
ERYTHROCYTE [DISTWIDTH] IN BLOOD BY AUTOMATED COUNT: 14 % (ref 11.6–15.1)
GAS + CO PNL BLDA: 2.1 % (ref 0–1.5)
GFR SERPL CREATININE-BSD FRML MDRD: 82 ML/MIN/1.73SQ M
GLUCOSE SERPL-MCNC: 116 MG/DL (ref 65–140)
HCT VFR BLD AUTO: 48 % (ref 34.8–46.1)
HGB BLD-MCNC: 16.2 G/DL (ref 11.5–15.4)
IMM GRANULOCYTES # BLD AUTO: 0.06 THOUSAND/UL (ref 0–0.2)
IMM GRANULOCYTES NFR BLD AUTO: 1 % (ref 0–2)
LIPASE SERPL-CCNC: 12 U/L (ref 11–82)
LYMPHOCYTES # BLD AUTO: 1.38 THOUSANDS/ÂΜL (ref 0.6–4.47)
LYMPHOCYTES NFR BLD AUTO: 14 % (ref 14–44)
MCH RBC QN AUTO: 29.6 PG (ref 26.8–34.3)
MCHC RBC AUTO-ENTMCNC: 33.8 G/DL (ref 31.4–37.4)
MCV RBC AUTO: 88 FL (ref 82–98)
MONOCYTES # BLD AUTO: 0.76 THOUSAND/ÂΜL (ref 0.17–1.22)
MONOCYTES NFR BLD AUTO: 8 % (ref 4–12)
NEUTROPHILS # BLD AUTO: 7.89 THOUSANDS/ÂΜL (ref 1.85–7.62)
NEUTS SEG NFR BLD AUTO: 76 % (ref 43–75)
NRBC BLD AUTO-RTO: 0 /100 WBCS
PLATELET # BLD AUTO: 253 THOUSANDS/UL (ref 149–390)
PMV BLD AUTO: 10.5 FL (ref 8.9–12.7)
POTASSIUM SERPL-SCNC: 3.7 MMOL/L (ref 3.5–5.3)
PROT SERPL-MCNC: 7.3 G/DL (ref 6.4–8.4)
RBC # BLD AUTO: 5.47 MILLION/UL (ref 3.81–5.12)
SODIUM SERPL-SCNC: 141 MMOL/L (ref 135–147)
WBC # BLD AUTO: 10.17 THOUSAND/UL (ref 4.31–10.16)

## 2025-07-31 PROCEDURE — 36415 COLL VENOUS BLD VENIPUNCTURE: CPT | Performed by: EMERGENCY MEDICINE

## 2025-07-31 PROCEDURE — 96361 HYDRATE IV INFUSION ADD-ON: CPT

## 2025-07-31 PROCEDURE — 99285 EMERGENCY DEPT VISIT HI MDM: CPT | Performed by: EMERGENCY MEDICINE

## 2025-07-31 PROCEDURE — 85025 COMPLETE CBC W/AUTO DIFF WBC: CPT | Performed by: EMERGENCY MEDICINE

## 2025-07-31 PROCEDURE — 82375 ASSAY CARBOXYHB QUANT: CPT | Performed by: EMERGENCY MEDICINE

## 2025-07-31 PROCEDURE — 70496 CT ANGIOGRAPHY HEAD: CPT

## 2025-07-31 PROCEDURE — 80053 COMPREHEN METABOLIC PANEL: CPT | Performed by: EMERGENCY MEDICINE

## 2025-07-31 PROCEDURE — 96365 THER/PROPH/DIAG IV INF INIT: CPT

## 2025-07-31 PROCEDURE — 96366 THER/PROPH/DIAG IV INF ADDON: CPT

## 2025-07-31 PROCEDURE — 70498 CT ANGIOGRAPHY NECK: CPT

## 2025-07-31 PROCEDURE — 93005 ELECTROCARDIOGRAM TRACING: CPT

## 2025-07-31 PROCEDURE — 96375 TX/PRO/DX INJ NEW DRUG ADDON: CPT

## 2025-07-31 PROCEDURE — 83690 ASSAY OF LIPASE: CPT | Performed by: EMERGENCY MEDICINE

## 2025-07-31 PROCEDURE — 99284 EMERGENCY DEPT VISIT MOD MDM: CPT

## 2025-07-31 RX ORDER — ASPIRIN 81 MG/1
81 TABLET ORAL DAILY
Status: DISCONTINUED | OUTPATIENT
Start: 2025-08-01 | End: 2025-08-01 | Stop reason: HOSPADM

## 2025-07-31 RX ORDER — ATORVASTATIN CALCIUM 40 MG/1
80 TABLET, FILM COATED ORAL
Status: DISCONTINUED | OUTPATIENT
Start: 2025-07-31 | End: 2025-08-01 | Stop reason: HOSPADM

## 2025-07-31 RX ORDER — ONDANSETRON 2 MG/ML
4 INJECTION INTRAMUSCULAR; INTRAVENOUS ONCE
Status: COMPLETED | OUTPATIENT
Start: 2025-07-31 | End: 2025-07-31

## 2025-07-31 RX ORDER — VENLAFAXINE 75 MG/1
75 TABLET ORAL 2 TIMES DAILY
Status: DISCONTINUED | OUTPATIENT
Start: 2025-07-31 | End: 2025-07-31

## 2025-07-31 RX ORDER — SODIUM CHLORIDE, SODIUM GLUCONATE, SODIUM ACETATE, POTASSIUM CHLORIDE, MAGNESIUM CHLORIDE, SODIUM PHOSPHATE, DIBASIC, AND POTASSIUM PHOSPHATE .53; .5; .37; .037; .03; .012; .00082 G/100ML; G/100ML; G/100ML; G/100ML; G/100ML; G/100ML; G/100ML
125 INJECTION, SOLUTION INTRAVENOUS CONTINUOUS
Status: DISCONTINUED | OUTPATIENT
Start: 2025-07-31 | End: 2025-08-01 | Stop reason: HOSPADM

## 2025-07-31 RX ORDER — AMLODIPINE BESYLATE 5 MG/1
5 TABLET ORAL DAILY
Status: DISCONTINUED | OUTPATIENT
Start: 2025-08-01 | End: 2025-08-01 | Stop reason: HOSPADM

## 2025-07-31 RX ORDER — FAMOTIDINE 20 MG/1
20 TABLET, FILM COATED ORAL
Status: DISCONTINUED | OUTPATIENT
Start: 2025-07-31 | End: 2025-08-01 | Stop reason: HOSPADM

## 2025-07-31 RX ORDER — MECLIZINE HCL 12.5 MG 12.5 MG/1
25 TABLET ORAL ONCE
Status: COMPLETED | OUTPATIENT
Start: 2025-07-31 | End: 2025-07-31

## 2025-07-31 RX ORDER — VENLAFAXINE HYDROCHLORIDE 75 MG/1
CAPSULE, EXTENDED RELEASE ORAL
Status: DISCONTINUED
Start: 2025-07-31 | End: 2025-08-01 | Stop reason: WASHOUT

## 2025-07-31 RX ORDER — VENLAFAXINE 25 MG/1
TABLET ORAL
Status: DISCONTINUED
Start: 2025-07-31 | End: 2025-08-01 | Stop reason: WASHOUT

## 2025-07-31 RX ADMIN — MECLIZINE HYDROCHLORIDE 25 MG: 12.5 TABLET ORAL at 22:14

## 2025-07-31 RX ADMIN — ONDANSETRON 4 MG: 2 INJECTION INTRAMUSCULAR; INTRAVENOUS at 19:37

## 2025-07-31 RX ADMIN — SODIUM CHLORIDE 1000 ML: 0.9 INJECTION, SOLUTION INTRAVENOUS at 19:34

## 2025-07-31 RX ADMIN — SODIUM CHLORIDE, SODIUM GLUCONATE, SODIUM ACETATE, POTASSIUM CHLORIDE, MAGNESIUM CHLORIDE, SODIUM PHOSPHATE, DIBASIC, AND POTASSIUM PHOSPHATE 125 ML/HR: .53; .5; .37; .037; .03; .012; .00082 INJECTION, SOLUTION INTRAVENOUS at 22:15

## 2025-07-31 RX ADMIN — MECLIZINE HYDROCHLORIDE 25 MG: 12.5 TABLET ORAL at 19:38

## 2025-07-31 RX ADMIN — IOHEXOL 75 ML: 350 INJECTION, SOLUTION INTRAVENOUS at 20:34

## 2025-07-31 RX ADMIN — ATORVASTATIN CALCIUM 80 MG: 40 TABLET, FILM COATED ORAL at 23:02

## 2025-07-31 RX ADMIN — FAMOTIDINE 20 MG: 20 TABLET, FILM COATED ORAL at 23:02

## 2025-08-01 VITALS
RESPIRATION RATE: 16 BRPM | HEART RATE: 80 BPM | WEIGHT: 195.11 LBS | TEMPERATURE: 98.3 F | HEIGHT: 69 IN | BODY MASS INDEX: 28.9 KG/M2 | SYSTOLIC BLOOD PRESSURE: 145 MMHG | OXYGEN SATURATION: 98 % | DIASTOLIC BLOOD PRESSURE: 99 MMHG

## 2025-08-01 PROCEDURE — 97161 PT EVAL LOW COMPLEX 20 MIN: CPT

## 2025-08-01 PROCEDURE — 97166 OT EVAL MOD COMPLEX 45 MIN: CPT

## 2025-08-01 PROCEDURE — 96366 THER/PROPH/DIAG IV INF ADDON: CPT

## 2025-08-01 RX ORDER — MECLIZINE HYDROCHLORIDE 25 MG/1
25 TABLET ORAL 3 TIMES DAILY PRN
Qty: 30 TABLET | Refills: 0 | Status: SHIPPED | OUTPATIENT
Start: 2025-08-01

## 2025-08-01 RX ADMIN — LEVOTHYROXINE SODIUM 125 MCG: 25 TABLET ORAL at 05:55

## 2025-08-01 RX ADMIN — AMLODIPINE BESYLATE 5 MG: 5 TABLET ORAL at 08:43

## 2025-08-01 RX ADMIN — ASPIRIN 81 MG: 81 TABLET, COATED ORAL at 08:43

## 2025-08-02 LAB
ATRIAL RATE: 67 BPM
P AXIS: 74 DEGREES
PR INTERVAL: 144 MS
QRS AXIS: 84 DEGREES
QRSD INTERVAL: 90 MS
QT INTERVAL: 428 MS
QTC INTERVAL: 452 MS
T WAVE AXIS: 94 DEGREES
VENTRICULAR RATE: 67 BPM

## 2025-08-02 PROCEDURE — 93010 ELECTROCARDIOGRAM REPORT: CPT | Performed by: INTERNAL MEDICINE

## 2025-08-04 ENCOUNTER — VBI (OUTPATIENT)
Dept: FAMILY MEDICINE CLINIC | Facility: CLINIC | Age: 70
End: 2025-08-04

## 2025-08-15 ENCOUNTER — OFFICE VISIT (OUTPATIENT)
Dept: PHYSICAL THERAPY | Facility: CLINIC | Age: 70
End: 2025-08-15
Payer: COMMERCIAL

## 2025-08-18 DIAGNOSIS — I10 ESSENTIAL HYPERTENSION: ICD-10-CM

## 2025-08-18 DIAGNOSIS — F41.9 ANXIETY: ICD-10-CM

## 2025-08-18 DIAGNOSIS — F32.A DEPRESSIVE DISORDER: ICD-10-CM

## 2025-08-18 DIAGNOSIS — E03.9 HYPOTHYROIDISM, UNSPECIFIED TYPE: ICD-10-CM

## 2025-08-19 RX ORDER — LEVOTHYROXINE SODIUM 125 UG/1
125 TABLET ORAL DAILY
Qty: 90 TABLET | Refills: 1 | Status: SHIPPED | OUTPATIENT
Start: 2025-08-19

## 2025-08-19 RX ORDER — VENLAFAXINE 75 MG/1
75 TABLET ORAL 2 TIMES DAILY
Qty: 180 TABLET | Refills: 1 | Status: SHIPPED | OUTPATIENT
Start: 2025-08-19

## 2025-08-19 RX ORDER — AMLODIPINE BESYLATE 5 MG/1
5 TABLET ORAL DAILY
Qty: 100 TABLET | Refills: 1 | Status: SHIPPED | OUTPATIENT
Start: 2025-08-19

## (undated) DEVICE — SUT STRATAFIX SPIRAL MONOCRYL PLUS  3-0 PS-2 30 CM SXMP1B106

## (undated) DEVICE — SUT MONOCRYL 4-0 P-3 18 IN Y494G

## (undated) DEVICE — STERILE POLYISOPRENE POWDER-FREE SURGICAL GLOVES WITH EMOLLIENT COATING: Brand: PROTEXIS

## (undated) DEVICE — NEPTUNE E-SEP SMOKE EVACUATION PENCIL, COATED, 70MM BLADE, PUSH BUTTON SWITCH: Brand: NEPTUNE E-SEP

## (undated) DEVICE — SKIN MARKER DUAL TIP WITH RULER CAP, FLEXIBLE RULER AND LABELS: Brand: DEVON

## (undated) DEVICE — STERILE POLYISOPRENE POWDER-FREE SURGICAL GLOVES: Brand: PROTEXIS

## (undated) DEVICE — DRAPE SHEET THREE QUARTER

## (undated) DEVICE — SUT PDS II 2-0 CT-1 27 IN Z339H

## (undated) DEVICE — INTENDED FOR TISSUE SEPARATION, AND OTHER PROCEDURES THAT REQUIRE A SHARP SURGICAL BLADE TO PUNCTURE OR CUT.: Brand: BARD-PARKER ® SAFETYLOCK CARBON RIB-BACK BLADES

## (undated) DEVICE — SUT MONOCRYL 3-0 PS-2 27 IN Y427H

## (undated) DEVICE — NEEDLE BLUNT 18 G X 1 1/2IN

## (undated) DEVICE — SCD SEQUENTIAL COMPRESSION COMFORT SLEEVE MEDIUM KNEE LENGTH: Brand: KENDALL SCD

## (undated) DEVICE — JACKSON-PRATT 100CC BULB RESERVOIR: Brand: CARDINAL HEALTH

## (undated) DEVICE — DISPOSABLE OR TOWEL: Brand: CARDINAL HEALTH

## (undated) DEVICE — BETHLEHEM UNIVERSAL BREAST PK: Brand: CARDINAL HEALTH

## (undated) DEVICE — 3M™ STERI-STRIP™ REINFORCED ADHESIVE SKIN CLOSURES, R1547, 1/2 IN X 4 IN (12 MM X 100 MM), 6 STRIPS/ENVELOPE: Brand: 3M™ STERI-STRIP™

## (undated) DEVICE — SYRINGE 30ML LL

## (undated) DEVICE — JP CHAN DRN SIL HUBLESS 15FR W/TRO: Brand: CARDINAL HEALTH

## (undated) DEVICE — LAPAROTOMY SPONGE - RF AND X-RAY DETECTABLE PRE-WASHED: Brand: SITUATE

## (undated) DEVICE — ELECTRODE EZ CLEAN BLADE -0012

## (undated) DEVICE — DRESSING BIOPATCH ANTIMICROBIAL 1 IN DISC

## (undated) DEVICE — 3M™ IOBAN™ 2 ANTIMICROBIAL INCISE DRAPE 6650EZ: Brand: IOBAN™ 2

## (undated) DEVICE — DECANTER: Brand: UNBRANDED

## (undated) DEVICE — DRESSING MEPORE FILM ADHESIVE 4 X 5IN

## (undated) DEVICE — EXOFIN PRECISION PEN HIGH VISCOSITY TOPICAL SKIN ADHESIVE: Brand: EXOFIN PRECISION PEN, 1G

## (undated) DEVICE — SYRINGE 50ML LL

## (undated) DEVICE — UNDYED MONOFILAMENT (POLYDIOXANONE), ABSORBABLE SURGICAL SUTURE: Brand: PDS